# Patient Record
Sex: FEMALE | Race: WHITE | NOT HISPANIC OR LATINO | Employment: OTHER | URBAN - METROPOLITAN AREA
[De-identification: names, ages, dates, MRNs, and addresses within clinical notes are randomized per-mention and may not be internally consistent; named-entity substitution may affect disease eponyms.]

---

## 2017-02-16 ENCOUNTER — TELEPHONE (OUTPATIENT)
Dept: INTERNAL MEDICINE | Facility: CLINIC | Age: 77
End: 2017-02-16

## 2017-02-16 RX ORDER — CEPHALEXIN 500 MG/1
500 CAPSULE ORAL 2 TIMES DAILY
Qty: 14 CAPSULE | Refills: 0 | Status: SHIPPED | OUTPATIENT
Start: 2017-02-16 | End: 2017-12-08

## 2017-02-16 NOTE — TELEPHONE ENCOUNTER
"Pt called to say that she has a wound on her L shin, same as last time, car door hit her and needs and antibiotic. She does have an appt on Wednesday with her wound. States she has a \"red Sun'aq\" on the area and is afraid it may get infected by next week.  "

## 2017-02-22 ENCOUNTER — OFFICE VISIT (OUTPATIENT)
Dept: WOUND CARE | Facility: HOSPITAL | Age: 77
End: 2017-02-22
Attending: SURGERY

## 2017-02-22 PROCEDURE — G0463 HOSPITAL OUTPT CLINIC VISIT: HCPCS

## 2017-02-23 NOTE — WOUND CARE CLINIC NOTE
DATE OF VISIT: 02/22/2017    CHIEF COMPLAINT: Traumatic injury to left shin (L97.221).     HISTORY OF PRESENT ILLNESS: The patient is a 76-year-old female who struck her left middle shin on a door, causing some injury. Apparently this is the 3rd time she has done that and she recognizes that she needs to have the door fixed. This got really red and tender and swollen but she got treatment from her family doctor and he gave her a weeks prescription for Keflex, and she got over-the-counter medicine called Silver Gel, which is apparently somewhat like SilvaSorb. She stated that after being on the antibiotics and using the Silver Gel for 3-4 days the redness went away and the wound dried up and got much better, and essentially has healed and she only came because she felt like she already had an appointment and just to have it checked out. She is putting no dressing or medication on the wound now, and she is having mild tenderness in the general area but nothing too severe and denies redness or drainage. She has a few more days of antibiotics.     ALLERGIES: No known drug allergies.     MEDICATIONS:  1.  Aspirin.  2.  Keflex 500 mg twice a day.  3.  Allegra 180 mg tablet once a day.  4.  Synthroid.  5.  Bactroban.  6.  Pyridium.  7.  Crestor.     FAMILY HISTORY: She is . She does not smoke now, but she stopped 28 years ago. She has occasional alcohol such as wine. She denies recreational drugs. Denies use of caffeine.     FAMILY HISTORY: Heart disease.     PAST SURGICAL HISTORY:  1.  Tubal ligation in 1985.  2.  Sinus surgery for deviated septum in 1963 and 1965, and 1968.  3.  Benign tumor in neck 6 years ago.     REVIEW OF SYSTEMS:  HEENT: Has had history of cataracts. No history of glaucoma. Has history of chronic sinus problems with surgery. Denies middle ear problems.   HEMATOLOGIC: Denies anemia, hemophilia, sickle cell disease.   RESPIRATORY: Denies COPD, asthma, sleep apnea, or tuberculosis.  CARDIAC:  Denies arrhythmias, congestive heart failure, coronary artery disease or heart attack. Denies problems with hypertension, but does have a history of hyperlipidemia.   GASTROINTESTINAL: Denies problems with cirrhosis, hepatitis, ulcerations or change in bowel habits.   ENDOCRINE: Denies problems with diabetes, but does have problems with thyroid insufficiency.   GENITOURINARY: Denies renal insufficiency, kidney stones, or kidney infections. RHEUMATOLOGY: Denies lupus, Raynaud's, scleroderma or rheumatoid arthritis. MUSCULOSKELETAL: Denies gout, but does have a history of osteoarthritis.  NEUROPSYCHIATRY: Denies dementia or neuropathy.     PHYSICAL EXAMINATION:  GENERAL: Well-nourished, well-developed female in no acute distress.   VITAL SIGNS: Temperature 97, pulse 65, respirations 16, blood pressure 128/63.   HEENT: EOMs intact. Pupils equal, round, and reactive to light. Nose and throat are clear.   CHEST: Clear to percussion and auscultation.   CARDIAC: Regular rhythm and rate, with no apparent murmur.   ABDOMEN: Soft, nontender.   MUSCULOSKELETAL: Has no obvious musculoskeletal neuromuscular deficit.   EXTREMITIES: Examination lower extremities reveal there is no edema or swelling. She has bilateral palpable pulses. She does have a small superficially scabbed area over the anterior mid-3rd of her left lower extremity. The scabbed area measures 1.5 x 0.6 cm. This is not a wound; it is a dry scab and there is no wound underneath. There is no redness, cellulitis, warmth or drainage from the wound. Essentially this is healed and is closed.     ASSESSMENT AND PLAN: The patient apparently suffered a traumatic wound to her left shin with some early cellulitis, but this was aborted by the use of antibiotics and some form of a topical silver dressing. This is going to closure as there is really no residual wound or sign of infection. She is going to finish out several more days of the prescription she has. She is not  really putting anything on the wound and when the rest of these flaky, thin scabs come off she will start using moisturizing lotion. If in the future she should develop a wound again, particularly in this area, she is to come back and see us and we will be glad to see her and take care of her. At this point in time she has no further wound problems.         Abdoul Vanessa MD  TOR:co  D:   02/22/2017 14:56:27  T:   02/22/2017 23:40:49  Job ID:   [id number]  Document ID:   [id number]  Rev:  0  cc:

## 2017-03-30 DIAGNOSIS — E03.9 HYPOTHYROIDISM, UNSPECIFIED TYPE: ICD-10-CM

## 2017-03-30 DIAGNOSIS — Z00.00 HEALTH CARE MAINTENANCE: Primary | ICD-10-CM

## 2017-03-30 DIAGNOSIS — E78.5 HYPERLIPIDEMIA, UNSPECIFIED HYPERLIPIDEMIA TYPE: ICD-10-CM

## 2017-03-31 LAB
ALBUMIN SERPL-MCNC: 4.3 G/DL (ref 3.5–5.2)
ALBUMIN/GLOB SERPL: 2 G/DL
ALP SERPL-CCNC: 62 U/L (ref 39–117)
ALT SERPL-CCNC: 20 U/L (ref 1–33)
APPEARANCE UR: CLEAR
AST SERPL-CCNC: 22 U/L (ref 1–32)
BACTERIA #/AREA URNS HPF: NORMAL /HPF
BASOPHILS # BLD AUTO: 0.01 10*3/MM3 (ref 0–0.2)
BASOPHILS NFR BLD AUTO: 0.2 % (ref 0–1.5)
BILIRUB SERPL-MCNC: 0.3 MG/DL (ref 0.1–1.2)
BILIRUB UR QL STRIP: NEGATIVE
BUN SERPL-MCNC: 13 MG/DL (ref 8–23)
BUN/CREAT SERPL: 16 (ref 7–25)
CALCIUM SERPL-MCNC: 9.7 MG/DL (ref 8.6–10.5)
CHLORIDE SERPL-SCNC: 103 MMOL/L (ref 98–107)
CHOLEST SERPL-MCNC: 200 MG/DL (ref 0–200)
CO2 SERPL-SCNC: 25.4 MMOL/L (ref 22–29)
COLOR UR: YELLOW
CREAT SERPL-MCNC: 0.81 MG/DL (ref 0.57–1)
EOSINOPHIL # BLD AUTO: 0.13 10*3/MM3 (ref 0–0.7)
EOSINOPHIL NFR BLD AUTO: 3 % (ref 0.3–6.2)
EPI CELLS #/AREA URNS HPF: NORMAL /HPF
ERYTHROCYTE [DISTWIDTH] IN BLOOD BY AUTOMATED COUNT: 13.9 % (ref 11.7–13)
GLOBULIN SER CALC-MCNC: 2.1 GM/DL
GLUCOSE SERPL-MCNC: 87 MG/DL (ref 65–99)
GLUCOSE UR QL: NEGATIVE
HCT VFR BLD AUTO: 43.8 % (ref 35.6–45.5)
HDLC SERPL-MCNC: 84 MG/DL (ref 40–60)
HGB BLD-MCNC: 14.2 G/DL (ref 11.9–15.5)
HGB UR QL STRIP: NEGATIVE
IMM GRANULOCYTES # BLD: 0 10*3/MM3 (ref 0–0.03)
IMM GRANULOCYTES NFR BLD: 0 % (ref 0–0.5)
KETONES UR QL STRIP: NEGATIVE
LDLC SERPL CALC-MCNC: 99 MG/DL (ref 0–100)
LEUKOCYTE ESTERASE UR QL STRIP: NEGATIVE
LYMPHOCYTES # BLD AUTO: 1.35 10*3/MM3 (ref 0.9–4.8)
LYMPHOCYTES NFR BLD AUTO: 31 % (ref 19.6–45.3)
MCH RBC QN AUTO: 30.4 PG (ref 26.9–32)
MCHC RBC AUTO-ENTMCNC: 32.4 G/DL (ref 32.4–36.3)
MCV RBC AUTO: 93.8 FL (ref 80.5–98.2)
MICRO URNS: NORMAL
MICRO URNS: NORMAL
MONOCYTES # BLD AUTO: 0.37 10*3/MM3 (ref 0.2–1.2)
MONOCYTES NFR BLD AUTO: 8.5 % (ref 5–12)
MUCOUS THREADS URNS QL MICRO: PRESENT /HPF
NEUTROPHILS # BLD AUTO: 2.5 10*3/MM3 (ref 1.9–8.1)
NEUTROPHILS NFR BLD AUTO: 57.3 % (ref 42.7–76)
NITRITE UR QL STRIP: NEGATIVE
PH UR STRIP: 6.5 [PH] (ref 5–7.5)
PLATELET # BLD AUTO: 193 10*3/MM3 (ref 140–500)
POTASSIUM SERPL-SCNC: 4.4 MMOL/L (ref 3.5–5.2)
PROT SERPL-MCNC: 6.4 G/DL (ref 6–8.5)
PROT UR QL STRIP: NEGATIVE
RBC # BLD AUTO: 4.67 10*6/MM3 (ref 3.9–5.2)
RBC #/AREA URNS HPF: NORMAL /HPF
SODIUM SERPL-SCNC: 143 MMOL/L (ref 136–145)
SP GR UR: 1.02 (ref 1–1.03)
TRIGL SERPL-MCNC: 85 MG/DL (ref 0–150)
TSH SERPL DL<=0.005 MIU/L-ACNC: 2.54 MIU/ML (ref 0.27–4.2)
URINALYSIS REFLEX: NORMAL
UROBILINOGEN UR STRIP-MCNC: 0.2 MG/DL (ref 0.2–1)
VLDLC SERPL CALC-MCNC: 17 MG/DL (ref 5–40)
WBC # BLD AUTO: 4.36 10*3/MM3 (ref 4.5–10.7)
WBC #/AREA URNS HPF: NORMAL /HPF

## 2017-03-31 NOTE — PROGRESS NOTES
Your laboratory results are NORMAL. We will discuss these results in detail at your next office visit. Please call with any questions or concerns.  Sincerely,  Sola Gómez MD

## 2017-04-07 ENCOUNTER — OFFICE VISIT (OUTPATIENT)
Dept: INTERNAL MEDICINE | Facility: CLINIC | Age: 77
End: 2017-04-07

## 2017-04-07 VITALS
HEART RATE: 68 BPM | DIASTOLIC BLOOD PRESSURE: 70 MMHG | OXYGEN SATURATION: 97 % | BODY MASS INDEX: 26.45 KG/M2 | WEIGHT: 140 LBS | SYSTOLIC BLOOD PRESSURE: 136 MMHG

## 2017-04-07 DIAGNOSIS — E78.5 HYPERLIPIDEMIA, UNSPECIFIED HYPERLIPIDEMIA TYPE: ICD-10-CM

## 2017-04-07 DIAGNOSIS — R45.89 DEPRESSED MOOD: ICD-10-CM

## 2017-04-07 DIAGNOSIS — E03.9 HYPOTHYROIDISM, UNSPECIFIED TYPE: Primary | ICD-10-CM

## 2017-04-07 PROCEDURE — 99214 OFFICE O/P EST MOD 30 MIN: CPT | Performed by: INTERNAL MEDICINE

## 2017-04-07 NOTE — PROGRESS NOTES
Chief Complaint   Patient presents with   • Hypothyroidism   • Hyperlipidemia       History of Present Illness   Johana Corrigan is a 76 y.o. female presents for follow up evaluation. She has a history of hypothyroidism and dyslipidemia. She is doing fairly well today. However, some reduction in mood related to multiple demands of her volunteer activities, etc. She also is having some marital discorse now that spouse is working at home but patient feels he is not motivated to leave the house or do any of the household duties.    Has hypothyroidism but thyroid is balanced.  Hyperlipidemia- good cholesterol levels off of crestor. She has modified her diet to lower fat.       The following portions of the patient's history were reviewed and updated as appropriate: allergies, current medications, past family history, past medical history, past social history, past surgical history and problem list.  Current Outpatient Prescriptions on File Prior to Visit   Medication Sig Dispense Refill   • fexofenadine (ALLEGRA) 180 MG tablet Take  by mouth.     • levothyroxine (SYNTHROID, LEVOTHROID) 50 MCG tablet Take 1 tablet by mouth Daily. 90 tablet 2   • rosuvastatin (CRESTOR) 5 MG tablet Take  by mouth.     • aspirin 81 MG tablet Take 1 tablet by mouth.     • cephalexin (KEFLEX) 500 MG capsule Take 1 capsule by mouth 2 (Two) Times a Day. 14 capsule 0   • mupirocin (BACTROBAN) 2 % ointment Apply  topically 3 (Three) Times a Day. 15 g 0   • phenazopyridine (PYRIDIUM) 200 MG tablet One tablet 3 x's a day as needed for symptoms 10 tablet 0     Current Facility-Administered Medications on File Prior to Visit   Medication Dose Route Frequency Provider Last Rate Last Dose   • cyanocobalamin injection 1,000 mcg  1,000 mcg Intramuscular Q28 Days Sola Gómez MD   1,000 mcg at 07/11/16 1305     Review of Systems   Constitutional: Negative.    HENT: Negative.    Eyes: Negative.    Respiratory: Negative.    Cardiovascular: Negative.     Gastrointestinal: Negative.    Endocrine: Negative.    Genitourinary: Negative.    Musculoskeletal: Negative.    Skin: Negative.    Allergic/Immunologic: Negative.    Neurological: Negative.    Hematological: Negative.    Psychiatric/Behavioral: Negative.        Objective   Physical Exam   Constitutional: She is oriented to person, place, and time. She appears well-developed and well-nourished.   HENT:   Head: Normocephalic and atraumatic.   Right Ear: External ear normal.   Left Ear: External ear normal.   Nose: Nose normal.   Mouth/Throat: Oropharynx is clear and moist.   Eyes: EOM are normal. Pupils are equal, round, and reactive to light.   Neck: Normal range of motion. Neck supple.   Cardiovascular: Normal rate, regular rhythm and normal heart sounds.    Pulmonary/Chest: Effort normal and breath sounds normal. No respiratory distress.   Abdominal: Soft.   Musculoskeletal: Normal range of motion.   Neurological: She is alert and oriented to person, place, and time.   Skin: Skin is warm and dry.   Psychiatric: She has a normal mood and affect. Her behavior is normal. Judgment and thought content normal.   Nursing note and vitals reviewed.       /70  Pulse 68  Wt 140 lb (63.5 kg)  SpO2 97%  BMI 26.45 kg/m2    Assessment/Plan   Diagnoses and all orders for this visit:    Hypothyroidism, unspecified type    Depressed mood    Hyperlipidemia, unspecified hyperlipidemia type    hypothyroidism- will continue synthroid daily.   Depressed mood- patient to f/u w/ counselor. To  Engage in healthy fitness. To find an activity w/ spouse.   Dyslipidemia- will increase fitness and monitor lipids off crestor.  F/u 8 mo or prn.

## 2017-05-26 ENCOUNTER — OFFICE VISIT (OUTPATIENT)
Dept: INTERNAL MEDICINE | Facility: CLINIC | Age: 77
End: 2017-05-26

## 2017-05-26 VITALS
HEIGHT: 61 IN | WEIGHT: 142 LBS | OXYGEN SATURATION: 97 % | SYSTOLIC BLOOD PRESSURE: 122 MMHG | DIASTOLIC BLOOD PRESSURE: 70 MMHG | HEART RATE: 69 BPM | RESPIRATION RATE: 18 BRPM | BODY MASS INDEX: 26.81 KG/M2

## 2017-05-26 DIAGNOSIS — H81.11 BPPV (BENIGN PAROXYSMAL POSITIONAL VERTIGO), RIGHT: Primary | ICD-10-CM

## 2017-05-26 PROCEDURE — 99213 OFFICE O/P EST LOW 20 MIN: CPT | Performed by: INTERNAL MEDICINE

## 2017-05-26 RX ORDER — METHYLPREDNISOLONE 4 MG/1
TABLET ORAL
Qty: 21 TABLET | Refills: 0 | Status: SHIPPED | OUTPATIENT
Start: 2017-05-26 | End: 2017-12-08

## 2017-05-26 RX ORDER — MECLIZINE HYDROCHLORIDE 25 MG/1
25 TABLET ORAL 3 TIMES DAILY PRN
Qty: 30 TABLET | Refills: 2 | Status: SHIPPED | OUTPATIENT
Start: 2017-05-26 | End: 2017-12-08

## 2017-06-05 ENCOUNTER — APPOINTMENT (OUTPATIENT)
Dept: PHYSICAL THERAPY | Facility: HOSPITAL | Age: 77
End: 2017-06-05

## 2017-06-07 ENCOUNTER — HOSPITAL ENCOUNTER (OUTPATIENT)
Dept: PHYSICAL THERAPY | Facility: HOSPITAL | Age: 77
Setting detail: THERAPIES SERIES
Discharge: HOME | End: 2017-06-07

## 2017-06-07 DIAGNOSIS — R26.89 LOSS OF BALANCE: ICD-10-CM

## 2017-06-07 DIAGNOSIS — Z78.9 DIFFICULTY WITH ACTIVITIES OF DAILY LIVING: Primary | ICD-10-CM

## 2017-06-07 PROCEDURE — G8979 MOBILITY GOAL STATUS: HCPCS | Performed by: PHYSICAL THERAPIST

## 2017-06-07 PROCEDURE — 97110 THERAPEUTIC EXERCISES: CPT | Performed by: PHYSICAL THERAPIST

## 2017-06-07 PROCEDURE — 97162 PT EVAL MOD COMPLEX 30 MIN: CPT | Performed by: PHYSICAL THERAPIST

## 2017-06-07 PROCEDURE — G8978 MOBILITY CURRENT STATUS: HCPCS | Performed by: PHYSICAL THERAPIST

## 2017-06-07 NOTE — THERAPY EVALUATION
Outpatient Physical Therapy Vestibular Initial Evaluation/Treatment Note  [facility]     Patient Name: Johana Corrigan  : 1940  MRN: [id number]  Today's Date: 2017      Visit Date: 2017    Patient Active Problem List   Diagnosis   • Atopic rhinitis   • Osteoarthritis   • Fatigue   • Hyperlipidemia   • Hypothyroidism   • Leukopenia   • Subclinical hypothyroidism   • Wound abscess   • Depressed mood        Past Medical History:   Diagnosis Date   • Allergic    • Arthritis    • Cataract    • Headache    • Hyperthyroidism         Past Surgical History:   Procedure Laterality Date   • NOSE SURGERY     • SUBMANDIBULAR GLAND EXCISION     • TONSILLECTOMY     • TUBAL ABDOMINAL LIGATION           Visit Dx:     ICD-10-CM ICD-9-CM   1. Difficulty with activities of daily living R53.81 799.3   2. Loss of balance R26.89 781.99             Patient History       17 0800          History    Chief Complaint Balance Problems;Difficulty with daily activities;Dizziness  -      Date Current Problem(s) Began 17  -MP      Brief Description of Current Complaint Johana reports vertigo beginning approximately two weeks ago.  She is able to do all her normal ADLs but is cautious going down steps and has more difficulty with balance first thing in the morning.  She has been doing some exercises for the vertigo since seeing Dr. Gómez 10-14 days ago. She feels that she is somewhat better since doing the exercises given by her Dr. Gómez.  -MP      Onset Date- PT 2017  -MP      Patient/Caregiver Goals Relief from dizziness;Know what to do to help the symptoms  -MP      Current Tobacco Use Zero  -MP      Smoking Status She quit smoking approximately 27 years ago.  -MP      Patient's Rating of General Health Good  -MP      Hand Dominance left-handed  -MP      Occupation/sports/leisure activities Retired .  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 Yes   Dr. Sola Gómez  -MP      How has patient tried to help current problem? Home exercises.  -MP      Fall Risk Assessment    Any falls in the past year: No  -MP      Services    Prior Rehab/Home Health Experiences No  -MP      Do you plan to receive Home Health services in the near future No  -MP      Daily Activities    Primary Language English  -MP      Pt Participated in POC and Goals Yes  -MP      Safety    Are you being hurt, hit, or frightened by anyone at home or in your life? No  -MP      Are you being neglected by a caregiver No  -MP        User Key  (r) = Recorded By, (t) = Taken By, (c) = Cosigned By    Initials Name Provider Type    MP Bull Pepper, PT Physical Therapist                [facility]       06/07/17 1300          Vestibular Objective    General Observation Gait was normal on level surface using no assistive device or brace  -MP      Occulomotor Exam Fixation Present    Spontaneous Nystagmus Absent  -MP      Gaze-induced Nystagmus Absent  -MP      Smooth Pursuit Normal  -MP      Saccades Intact  -MP      Vestibulo-Occular Reflex (VOR)    VOR to Slow Head Movement Normal  -MP      VOR to Fast Head Movement/Head Thrust Test Normal  -MP      VOR Cancellation Normal  -MP      Positional Testing    Vertebrobasilar Artery Screen - Right Negative  -MP      Vertebrobasilar Artery Screen - Left Negative  -MP      [place]-Hallpike Right No nystagmus  -MP      G[place]-Hallpike Left No nystagmus  -MP      Horizontal Roll Test Right No nystagmus  -MP      Horizontal Roll Test Left No nystagmus  -MP      ROM (Range of Motion)    General ROM Detail C spine flexion, WNL, extension minimal to moderate decrease, Rotation L moderate decrease, R minimal to moderate decrease, LB B moderate decrease but no nystagmus with any movement.    -MP      Sensation    Sensation --   C2-T2 dermatomes were intact/equal to light touch  -MP        User Key   (r) = Recorded By, (t) = Taken By, (c) = Cosigned By    Initials Name Provider Type    JOSE Pepper PT Physical Therapist                Therapy Education       06/07/17 1318          Therapy Education    Given HEP  -MP      Program New  -MP      How Provided Written;Demonstration  -MP      Provided to Patient  -MP      Level of Understanding Teach back education performed   Paul-Daroff Habitutation exercises were issued.  -MP        User Key  (r) = Recorded By, (t) = Taken By, (c) = Cosigned By    Initials Name Provider Type    JOSE Pepper PT Physical Therapist                  PT OP Goals       06/07/17 1300       PT Short Term Goals    STG Date to Achieve 06/23/17  -MP     STG 1 Ms. Bridge is reassessed if vertigo is till present on second visit.  -MP     STG 1 Progress New  -MP     STG 2 Dizziness handicap index improves to less than 20%.  -MP     STG 2 Progress New  -MP     Long Term Goals    LTG Date to Achieve 07/07/17  -MP     LTG 1 Ms. Shekhar is symptom free of vertigo.  -MP     LTG 1 Progress New  -MP     LTG 2 She is independent with all self care education.  -MP     LTG 2 Progress New  -MP     LTG 3 Dizziness handicap index improves to less than 10%.  -MP     LTG 3 Progress New  -MP     Time Calculation    PT Goal Re-Cert Due Date 07/07/17  -MP       User Key  (r) = Recorded By, (t) = Taken By, (c) = Cosigned By    Initials Name Provider Type    JOSE Pepper PT Physical Therapist                PT Assessment/Plan       06/07/17 1326       PT Assessment    Functional Limitations Impaired locomotion;Performance in leisure activities;Limitations in functional capacity and performance;Limitations in community activities  -MP     Impairments Balance;Locomotion  -MP     Assessment Comments Vertigo which, due to negative responses to BPPV testing, seems to be centrally based.    -MP     Please refer to paper survey for additional self-reported information Yes  -MP     Rehab Potential Good  -MP      Patient/caregiver participated in establishment of treatment plan and goals Yes  -MP     Patient would benefit from skilled therapy intervention Yes  -MP     PT Plan    PT Frequency Other (comment)   Once every 2 weeks  -MP     Predicted Duration of Therapy Intervention (days/wks) 4 weeks  -MP     Planned CPT's? PT EVAL MOD COMPLELITY: 01255;PT NEUROMUSC RE-EDUCATION EA 15 MIN: 34658;PT SELF CARE/HOME MGMT/TRAIN EA 15: 98388;PT THER PROC EA 15 MIN: 10819  -MP     PT Plan Comments Ms. Corrigan will be reassessed in approximately two weeks to see the effect of the Paul-Daroff habituation exercises.  -MP       User Key  (r) = Recorded By, (t) = Taken By, (c) = Cosigned By    Initials Name Provider Type    JOSE Pepper PT Physical Therapist                 Outcome Measures       06/07/17 1300          Functional Assessment    Outcome Measure Options Dizziness Handicap Inventory   23/100, 23% disability  -MP        User Key  (r) = Recorded By, (t) = Taken By, (c) = Cosigned By    Initials Name Provider Type    JOSE Pepper PT Physical Therapist            Time Calculation:   Start Time: 0800  Stop Time: 0845  Time Calculation (min): 45 min     Therapy Charges for Today     Code Description Service Date Service Provider Modifiers Qty    46791925007 HC PT MOBILITY CURRENT 6/7/2017 Bull Pepper, PT GP, CJ 1    95798312955 HC PT MOBILITY PROJECTED 6/7/2017 Bull Pepper PT GP, CI 1    24703252942 HC PT THER PROC EA 15 MIN 6/7/2017 Bull Pepper PT GP 1    08452901774 HC PT EVAL MOD COMPLEXITY 2 6/7/2017 Bull Pepper, PT GP 1          PT G-Codes  Outcome Measure Options: Dizziness Handicap Inventory  Score: 23/100, 23% disability  Functional Limitation: Mobility: Walking and moving around  Mobility: Walking and Moving Around Current Status (): At least 20 percent but less than 40 percent impaired, limited or restricted  Mobility: Walking and Moving Around Goal Status (): At least 1 percent but less than  20 percent impaired, limited or restricted         Bull Pepper, PT  6/7/2017

## 2017-06-22 ENCOUNTER — APPOINTMENT (OUTPATIENT)
Dept: PHYSICAL THERAPY | Facility: HOSPITAL | Age: 77
End: 2017-06-22

## 2017-07-24 ENCOUNTER — DOCUMENTATION (OUTPATIENT)
Dept: PHYSICAL THERAPY | Facility: HOSPITAL | Age: 77
End: 2017-07-24

## 2017-07-24 NOTE — THERAPY DISCHARGE NOTE
Outpatient Physical Therapy Ortho Discharge Summary       Patient Name: Johana Corrigan  : 1940  MRN: [id number]  Today's Date: 2017      Visit Date: 2017    Patient Active Problem List   Diagnosis   • Atopic rhinitis   • Osteoarthritis   • Fatigue   • Hyperlipidemia   • Hypothyroidism   • Leukopenia   • Subclinical hypothyroidism   • Wound abscess   • Depressed mood        Past Medical History:   Diagnosis Date   • Allergic    • Arthritis    • Cataract    • Headache    • Hyperthyroidism         Past Surgical History:   Procedure Laterality Date   • NOSE SURGERY     • SUBMANDIBULAR GLAND EXCISION     • TONSILLECTOMY     • TUBAL ABDOMINAL LIGATION           Visit Dx:   No diagnosis found.          PT OP Goals       17 0700       PT Short Term Goals    STG Date to Achieve 17  -MP     STG 1 Ms. Corrigan is reassessed if vertigo is till present on second visit.  -MP     STG 1 Progress New  -MP     STG 2 Dizziness handicap index improves to less than 20%.  -MP     STG 2 Progress Not Met  -MP     Long Term Goals    LTG Date to Achieve 17  -MP     LTG 1 Ms. Corrigan is symptom free of vertigo.  -MP     LTG 1 Progress Not Met  -MP     LTG 2 She is independent with all self care education.  -MP     LTG 2 Progress Not Met  -MP     LTG 3 Dizziness handicap index improves to less than 10%.  -MP     LTG 3 Progress Not Met  -MP       User Key  (r) = Recorded By, (t) = Taken By, (c) = Cosigned By    Initials Name Provider Type    JOSE Pepper, PT Physical Therapist                    OP PT Discharge Summary  Date of Discharge: 17  Reason for Discharge: other (comment) (Patient did not return for further treatment.)  Outcomes Achieved: Other (Unable to assess for progress.)  Discharge Destination: Unknown  Discharge Instructions: Ms. Corrigan was evaluated and did not return for further assessment/treatment.        Bull Pepper PT  2017

## 2017-07-28 RX ORDER — LEVOTHYROXINE SODIUM 0.05 MG/1
TABLET ORAL
Qty: 90 TABLET | Refills: 0 | Status: SHIPPED | OUTPATIENT
Start: 2017-07-28 | End: 2017-10-09 | Stop reason: SDUPTHER

## 2017-09-20 ENCOUNTER — TRANSCRIBE ORDERS (OUTPATIENT)
Dept: PHYSICAL THERAPY | Facility: HOSPITAL | Age: 77
End: 2017-09-20

## 2017-09-20 DIAGNOSIS — M75.21 BICEPS TENDONITIS ON RIGHT: Primary | ICD-10-CM

## 2017-10-04 ENCOUNTER — FLU SHOT (OUTPATIENT)
Dept: INTERNAL MEDICINE | Facility: CLINIC | Age: 77
End: 2017-10-04

## 2017-10-04 ENCOUNTER — HOSPITAL ENCOUNTER (OUTPATIENT)
Dept: PHYSICAL THERAPY | Facility: HOSPITAL | Age: 77
Setting detail: THERAPIES SERIES
Discharge: HOME | End: 2017-10-04
Attending: SPECIALIST

## 2017-10-04 DIAGNOSIS — Z78.9 DIFFICULTY WITH ACTIVITIES OF DAILY LIVING: ICD-10-CM

## 2017-10-04 DIAGNOSIS — M25.511 PAIN IN JOINT OF RIGHT SHOULDER: Primary | ICD-10-CM

## 2017-10-04 PROCEDURE — G8985 CARRY GOAL STATUS: HCPCS | Performed by: PHYSICAL THERAPIST

## 2017-10-04 PROCEDURE — G8984 CARRY CURRENT STATUS: HCPCS | Performed by: PHYSICAL THERAPIST

## 2017-10-04 PROCEDURE — 97110 THERAPEUTIC EXERCISES: CPT | Performed by: PHYSICAL THERAPIST

## 2017-10-04 PROCEDURE — 90662 IIV NO PRSV INCREASED AG IM: CPT | Performed by: INTERNAL MEDICINE

## 2017-10-04 PROCEDURE — 97161 PT EVAL LOW COMPLEX 20 MIN: CPT | Performed by: PHYSICAL THERAPIST

## 2017-10-04 PROCEDURE — 90471 IMMUNIZATION ADMIN: CPT | Performed by: INTERNAL MEDICINE

## 2017-10-04 NOTE — THERAPY EVALUATION
Outpatient Physical Therapy Ortho Initial Evaluation  [facility]     Patient Name: Johana Corrigan  : 1940  MRN: [id number]  Today's Date: 10/4/2017      Visit Date: 10/04/2017    Patient Active Problem List   Diagnosis   • Atopic rhinitis   • Osteoarthritis   • Fatigue   • Hyperlipidemia   • Hypothyroidism   • Leukopenia   • Subclinical hypothyroidism   • Wound abscess   • Depressed mood        Past Medical History:   Diagnosis Date   • Allergic    • Arthritis    • Cataract    • Headache    • Hyperthyroidism         Past Surgical History:   Procedure Laterality Date   • NOSE SURGERY     • SUBMANDIBULAR GLAND EXCISION     • TONSILLECTOMY     • TUBAL ABDOMINAL LIGATION         Visit Dx:     ICD-10-CM ICD-9-CM   1. Pain in joint of right shoulder M25.511 719.41   2. Difficulty with activities of daily living R53.81 799.3             Patient History       10/04/17 0800          History    Chief Complaint Difficulty with daily activities;Pain  -MP      Type of Pain Shoulder pain  -MP      Date Current Problem(s) Began 17  -      Brief Description of Current Complaint Johana reports having R shoulder pain and difficulty with daily activities, specifically turning a steering wheell, for approximately 4 weeks.  She visited Dr. Colon and she did an X ray and gave Johana a cortisone injection within a week of the problem starting.  She feels that the pain is improved but due to the pain and difficulty turning her steering wheel.    -MP      Onset Date- PT 2017  -MP      Patient/Caregiver Goals Relieve pain;Return to prior level of function;Know what to do to help the symptoms   No problem with steering wheel.  -MP      Current Tobacco Use Zero  -MP      Smoking Status Quit smoking 26 years ago.  -MP      Patient's Rating of General Health Very good  -MP      Hand Dominance left-handed  -MP      Occupation/sports/leisure activities Retired .  She enjoys reading, reading groups,  needle point and socializing with her friends.  -MP      Pain     Pain Location Shoulder   Right  -MP      Pain at Present 0  -MP      Pain at Best 0  -MP      Pain at Worst 7  -MP      Pain Frequency Intermittent  -MP      Pain Description Sharp;Stabbing  -MP      What Performance Factors Make the Current Problem(s) WORSE? Turning her steering wheel.  -MP      What Performance Factors Make the Current Problem(s) BETTER? Not turning the steering wheel or not using the R UE.  -MP      Fall Risk Assessment    Any falls in the past year: No  -MP      Does patient have a fear of falling No  -MP      Services    Prior Rehab/Home Health Experiences No  -MP      Do you plan to receive Home Health services in the near future No  -MP      Daily Activities    Primary Language English  -MP      Are you able to read Yes  -MP      Are you able to write Yes  -MP      How does patient learn best? Demonstration;Pictures/Video  -MP      Pt Participated in POC and Goals Yes  -MP      Safety    Are you being hurt, hit, or frightened by anyone at home or in your life? No  -MP      Are you being neglected by a caregiver No  -MP        User Key  (r) = Recorded By, (t) = Taken By, (c) = Cosigned By    Initials Name Provider Type    JOSE Pepper PT Physical Therapist                PT Ortho       10/04/17 0800    Posture/Observations    Posture/Observations Comments Lateral view of the spine, forward head, increased thoracic kyphosis and decreased lumbar lordosis.  Sensation: C2-T2 dermatomes were equal/intact B.  DTRs: B UEs were trace/equal B.  Upper motor neuron tests; Cogwheel Clonus and Jarquin's sign were negative.  -MP    ROM (Range of Motion)    General ROM Detail Functional AROM of the Shoulders, elevation in the scapular plane L 166 degrees, 125 degrees with pain at end range, behind the neck, L T1 spinous process, R C6 spinous process with pain and reach behind the back, L T12 spinous process with pain, R T8 spinous process.   PROM in supine, R shoulder flexion 171 degrees,  degrees and IR 80 degrees, all with normal end feels.  L shoulder flexion 159 empty end feel,  degrees with empty end feel and IR, 35 degrees with spasm end feel.  -MP    Pathomechanics    Pathomechanics Comments Special Tests: Supraspinatus, L negative, R positive.  Neers, L negative, R positive.  Guerrero/Jorge A, L negative, R positive  -MP      User Key  (r) = Recorded By, (t) = Taken By, (c) = Cosigned By    Initials Name Provider Type    JOSE Pepper, PT Physical Therapist                Therapy Education       10/04/17 0911          Therapy Education    Education Details Sleeper stretch for the R UE was issued to begin her HEP.  -MP      Given HEP  -MP      Program New  -MP      How Provided Written  -MP      Provided to Patient  -MP      Level of Understanding Teach back education performed  -MP        User Key  (r) = Recorded By, (t) = Taken By, (c) = Cosigned By    Initials Name Provider Type    JOSE Pepper PT Physical Therapist                PT OP Goals       10/04/17 0900       PT Short Term Goals    STG Date to Achieve 11/03/17  -MP     STG 1 Johana is introduced to PROM exercises to improve flexion, ER and IR of the R shoulder.  -MP     STG 1 Progress New  -MP     STG 2 She begins exercises to strength scapular musculature.  -MP     STG 2 Progress New  -MP     STG 3 Shoulder wall slides are begun.  -MP     STG 3 Progress New  -MP     Long Term Goals    LTG Date to Achieve 12/01/17  -MP     LTG 1 Functional AROM of the R shoulder is pain free and equal to the L.  -MP     LTG 1 Progress New  -MP     LTG 2 Strength, R shoulder, cardinal motions, equals 4+/5.  -MP     LTG 2 Progress New  -MP     LTG 3 She is able to turn her steering wheel without R shoulder pain.  -MP     LTG 3 Progress New  -MP     LTG 4 Johana is independent with a complete HEP and self care eduation.  -MP     LTG 4 Progress New  -MP     Time Calculation    PT Goal  Re-Cert Due Date 11/03/17  -MP       User Key  (r) = Recorded By, (t) = Taken By, (c) = Cosigned By    Initials Name Provider Type    JOSE Pepper PT Physical Therapist                Exercises       10/04/17 0900          Exercise 1    Exercise Name 1 In R sidelying, she performed sleeper stretch, R UE, 30 s x 5.    -MP        User Key  (r) = Recorded By, (t) = Taken By, (c) = Cosigned By    Initials Name Provider Type    JOSE Pepper PT Physical Therapist           Manual Rx (last 36 hours)      Manual Treatments       10/04/17 0900          Manual Rx 1    Manual Rx 1 Location R UE   -MP      Manual Rx 1 Type oscillations  -MP      Manual Rx 1 Duration 3 minutes  -MP      Manual Rx 2    Manual Rx 2 Location R GH joint  -MP      Manual Rx 2 Type Posterior glides  -MP      Manual Rx 2 Grade 3  -MP      Manual Rx 2 Duration 5 x 3.  -MP        User Key  (r) = Recorded By, (t) = Taken By, (c) = Cosigned By    Initials Name Provider Type    JOSE Pepper PT Physical Therapist                 Outcome Measures       10/04/17 0900          DASH    DASH COMMENTS 15  -MP      Functional Assessment    Outcome Measure Options Disabilities of the Arm, Shoulder, and Hand (DASH)  -MP        User Key  (r) = Recorded By, (t) = Taken By, (c) = Cosigned By    Initials Name Provider Type    JOSE Pepper PT Physical Therapist            Time Calculation:   Start Time: 0800  Stop Time: 0845  Time Calculation (min): 45 min     Therapy Charges for Today     Code Description Service Date Service Provider Modifiers Qty    46822089080 HC PT CARRY MOV HAND OBJ CURRENT 10/4/2017 Bull Pepper PT GP, CJ 1    39448751901 HC PT CARRY MOV HAND OBJ PROJECTED 10/4/2017 Bull Pepper PT GP, CI 1    13657425021 HC PT THER PROC EA 15 MIN 10/4/2017 Bull Pepper PT GP 1    08440530646 HC PT EVAL LOW COMPLEXITY 2 10/4/2017 Bull Pepper PT GP 1          PT G-Codes  PT Professional Judgement Used?: Yes  Outcome Measure Options: Disabilities  of the Arm, Shoulder, and Hand (DASH)  Score: 15  Functional Limitation: Carrying, moving and handling objects  Carrying, Moving and Handling Objects Current Status (): At least 20 percent but less than 40 percent impaired, limited or restricted  Carrying, Moving and Handling Objects Goal Status (): At least 1 percent but less than 20 percent impaired, limited or restricted         Bull Pepper, PT  10/4/2017

## 2017-10-05 ENCOUNTER — TRANSCRIBE ORDERS (OUTPATIENT)
Dept: ADMINISTRATIVE | Facility: HOSPITAL | Age: 77
End: 2017-10-05

## 2017-10-05 DIAGNOSIS — Z12.31 VISIT FOR SCREENING MAMMOGRAM: Primary | ICD-10-CM

## 2017-10-06 ENCOUNTER — APPOINTMENT (OUTPATIENT)
Dept: PHYSICAL THERAPY | Facility: HOSPITAL | Age: 77
End: 2017-10-06

## 2017-10-09 ENCOUNTER — HOSPITAL ENCOUNTER (OUTPATIENT)
Dept: PHYSICAL THERAPY | Facility: HOSPITAL | Age: 77
Setting detail: THERAPIES SERIES
Discharge: HOME | End: 2017-10-09

## 2017-10-09 DIAGNOSIS — Z78.9 DIFFICULTY WITH ACTIVITIES OF DAILY LIVING: ICD-10-CM

## 2017-10-09 DIAGNOSIS — M25.511 PAIN IN JOINT OF RIGHT SHOULDER: Primary | ICD-10-CM

## 2017-10-09 PROCEDURE — 97110 THERAPEUTIC EXERCISES: CPT | Performed by: PHYSICAL THERAPIST

## 2017-10-09 PROCEDURE — 97140 MANUAL THERAPY 1/> REGIONS: CPT | Performed by: PHYSICAL THERAPIST

## 2017-10-09 RX ORDER — LEVOTHYROXINE SODIUM 0.05 MG/1
50 TABLET ORAL DAILY
Qty: 90 TABLET | Refills: 0 | Status: SHIPPED | OUTPATIENT
Start: 2017-10-09 | End: 2017-12-08 | Stop reason: DRUGHIGH

## 2017-10-09 NOTE — THERAPY TREATMENT NOTE
Outpatient Physical Therapy Ortho Treatment Note  [facility]     Patient Name: Johana Corrigan  : 1940  MRN: [id number]  Today's Date: 10/9/2017      Visit Date: 10/09/2017    Visit Dx:    ICD-10-CM ICD-9-CM   1. Pain in joint of right shoulder M25.511 719.41   2. Difficulty with activities of daily living R53.81 799.3       Patient Active Problem List   Diagnosis   • Atopic rhinitis   • Osteoarthritis   • Fatigue   • Hyperlipidemia   • Hypothyroidism   • Leukopenia   • Subclinical hypothyroidism   • Wound abscess   • Depressed mood        Past Medical History:   Diagnosis Date   • Allergic    • Arthritis    • Cataract    • Headache    • Hyperthyroidism         Past Surgical History:   Procedure Laterality Date   • NOSE SURGERY     • SUBMANDIBULAR GLAND EXCISION     • TONSILLECTOMY     • TUBAL ABDOMINAL LIGATION               PT Assessment/Plan       10/09/17 1648       PT Assessment    Assessment Comments Johana tolerated treatment well.  The R shoulder was weaker with ER PRE versus the L shoulder.  She was not having pain following treatment and progressed her HEP.  -MP     PT Plan    PT Plan Comments Continue progressing therapeutic exercises per her tolerance level.  -MP       User Key  (r) = Recorded By, (t) = Taken By, (c) = Cosigned By    Initials Name Provider Type    JOSE Pepper, PT Physical Therapist                Exercises       10/09/17 1600          Subjective Comments    Subjective Comments Johana stated that her R shoulder has felt good recently.  -MP      Subjective Pain    Able to rate subjective pain? yes  -MP      Pre-Treatment Pain Level 0  -MP      Post-Treatment Pain Level 0  -MP      Exercise 1    Exercise Name 1 Refer to land flow sheet  -MP      Exercise 2    Exercise Name 2 Progressed therapeutic exercsies with standing straight arm lat pulls, 12.5 lbs. 10 x 3, scapular row, 10 lbs. 10 x 3, shoulder wall slides with towel, x 10 and sidelying ER PRE, L UE 2 lbs. 10 x 2 and R  2 lbs, x 10 and 1 lb. x 10.    -MP        User Key  (r) = Recorded By, (t) = Taken By, (c) = Cosigned By    Initials Name Provider Type    JOSE Pepper PT Physical Therapist             Manual Rx (last 36 hours)      Manual Treatments       10/09/17 1500          Manual Rx 1    Manual Rx 1 Location R UE   -MP      Manual Rx 1 Type oscillations  -MP      Manual Rx 1 Duration 3 minutes  -MP      Manual Rx 2    Manual Rx 2 Location R GH joint  -MP      Manual Rx 2 Type Posterior, inferior and anterior glides  -MP      Manual Rx 2 Grade 3  -MP      Manual Rx 2 Duration 5 x 3 each.  -MP        User Key  (r) = Recorded By, (t) = Taken By, (c) = Cosigned By    Initials Name Provider Type    JOSE Pepper PT Physical Therapist                PT OP Goals       10/09/17 1600       PT Short Term Goals    STG Date to Achieve 11/03/17  -MP     STG 1 Johana is introduced to PROM exercises to improve flexion, ER and IR of the R shoulder.  -MP     STG 1 Progress Partially Met  -MP     STG 2 She begins exercises to strength scapular musculature.  -MP     STG 2 Progress Met  -MP     STG 3 Shoulder wall slides are begun.  -MP     STG 3 Progress Met  -MP     Long Term Goals    LTG Date to Achieve 12/01/17  -MP     LTG 1 Functional AROM of the R shoulder is pain free and equal to the L.  -MP     LTG 1 Progress Ongoing  -MP     LTG 2 Strength, R shoulder, cardinal motions, equals 4+/5.  -MP     LTG 2 Progress Ongoing  -MP     LTG 3 She is able to turn her steering wheel without R shoulder pain.  -MP     LTG 3 Progress Ongoing  -MP     LTG 4 Johana is independent with a complete HEP and self care eduation.  -MP     LTG 4 Progress Ongoing  -MP       User Key  (r) = Recorded By, (t) = Taken By, (c) = Cosigned By    Initials Name Provider Type    JOSE Pepper PT Physical Therapist                Therapy Education       10/09/17 1647          Therapy Education    Education Details Shoulder wall slides added to her HEP.  -MP       Given HEP  -MP      Program New  -MP      How Provided Written  -MP      Provided to Patient  -MP      Level of Understanding Teach back education performed  -MP        User Key  (r) = Recorded By, (t) = Taken By, (c) = Cosigned By    Initials Name Provider Type    MP Bull Pepper PT Physical Therapist        Time Calculation:   Start Time: 1600  Stop Time: 1640  Time Calculation (min): 40 min    Therapy Charges for Today     Code Description Service Date Service Provider Modifiers Qty    55602538291 HC PT THER PROC EA 15 MIN 10/9/2017 Bull Pepper PT GP 2    10235339496 HC PT MANUAL THERAPY EA 15 MIN 10/9/2017 Bull Pepper PT GP 1          Bull Pepper PT  10/9/2017

## 2017-10-12 ENCOUNTER — HOSPITAL ENCOUNTER (OUTPATIENT)
Dept: PHYSICAL THERAPY | Facility: HOSPITAL | Age: 77
Setting detail: THERAPIES SERIES
Discharge: HOME | End: 2017-10-12

## 2017-10-12 DIAGNOSIS — Z78.9 DIFFICULTY WITH ACTIVITIES OF DAILY LIVING: ICD-10-CM

## 2017-10-12 DIAGNOSIS — M25.511 PAIN IN JOINT OF RIGHT SHOULDER: Primary | ICD-10-CM

## 2017-10-12 PROCEDURE — 97110 THERAPEUTIC EXERCISES: CPT | Performed by: PHYSICAL THERAPIST

## 2017-10-12 NOTE — THERAPY TREATMENT NOTE
Outpatient Physical Therapy Ortho Treatment Note  [facility]     Patient Name: Johana Corrigan  : 1940  MRN: [id number]  Today's Date: 10/12/2017      Visit Date: 10/12/2017    Visit Dx:    ICD-10-CM ICD-9-CM   1. Pain in joint of right shoulder M25.511 719.41   2. Difficulty with activities of daily living R53.81 799.3       Patient Active Problem List   Diagnosis   • Atopic rhinitis   • Osteoarthritis   • Fatigue   • Hyperlipidemia   • Hypothyroidism   • Leukopenia   • Subclinical hypothyroidism   • Wound abscess   • Depressed mood        Past Medical History:   Diagnosis Date   • Allergic    • Arthritis    • Cataract    • Headache    • Hyperthyroidism         Past Surgical History:   Procedure Laterality Date   • NOSE SURGERY     • SUBMANDIBULAR GLAND EXCISION     • TONSILLECTOMY     • TUBAL ABDOMINAL LIGATION               PT Assessment/Plan       10/12/17 0851       PT Assessment    Assessment Comments Johana is responding well to treatment thus far.    -MP     PT Plan    PT Plan Comments Progress therapeutic exercises with resisted dumbell overhead press  -MP       User Key  (r) = Recorded By, (t) = Taken By, (c) = Cosigned By    Initials Name Provider Type    JOSE Pepper, PT Physical Therapist                Exercises       10/12/17 0800          Subjective Comments    Subjective Comments Johana stated that her R shoulder is feeling better.  -MP      Subjective Pain    Able to rate subjective pain? yes  -MP      Pre-Treatment Pain Level 0  -MP      Post-Treatment Pain Level 0  -MP      Exercise 1    Exercise Name 1 Refer to land flow sheet  -MP        User Key  (r) = Recorded By, (t) = Taken By, (c) = Cosigned By    Initials Name Provider Type    JOSE Pepper PT Physical Therapist             Manual Rx (last 36 hours)      Manual Treatments       10/12/17 0700          Manual Rx 1    Manual Rx 1 Location R UE   -MP      Manual Rx 1 Type oscillations  -MP      Manual Rx 1 Duration 3 minutes   -MP      Manual Rx 2    Manual Rx 2 Location R GH joint  -MP      Manual Rx 2 Type Inferiro and posterior glides  -MP      Manual Rx 2 Grade 3-4  -MP      Manual Rx 2 Duration 5 x 3 each.  -MP        User Key  (r) = Recorded By, (t) = Taken By, (c) = Cosigned By    Initials Name Provider Type    JOSE Pepper PT Physical Therapist                PT OP Goals       10/12/17 0800       PT Short Term Goals    STG Date to Achieve 11/03/17  -MP     STG 1 Johana is introduced to PROM exercises to improve flexion, ER and IR of the R shoulder.  -MP     STG 1 Progress Partially Met  -MP     STG 2 She begins exercises to strength scapular musculature.  -MP     STG 2 Progress Met  -MP     STG 3 Shoulder wall slides are begun.  -MP     STG 3 Progress Met  -MP     Long Term Goals    LTG Date to Achieve 12/01/17  -MP     LTG 1 Functional AROM of the R shoulder is pain free and equal to the L.  -MP     LTG 1 Progress Ongoing  -MP     LTG 2 Strength, R shoulder, cardinal motions, equals 4+/5.  -MP     LTG 2 Progress Ongoing  -MP     LTG 3 She is able to turn her steering wheel without R shoulder pain.  -MP     LTG 3 Progress Ongoing  -MP     LTG 4 Johana is independent with a complete HEP and self care eduation.  -MP     LTG 4 Progress Ongoing  -MP       User Key  (r) = Recorded By, (t) = Taken By, (c) = Cosigned By    Initials Name Provider Type    JOSE Pepper PT Physical Therapist                Therapy Education       10/12/17 0851          Therapy Education    Given HEP  -MP      Program Reinforced  -MP      How Provided Verbal  -MP      Provided to Patient  -MP      Level of Understanding Teach back education performed  -MP        User Key  (r) = Recorded By, (t) = Taken By, (c) = Cosigned By    Initials Name Provider Type    JOSE Pepper PT Physical Therapist        Time Calculation:   Start Time: 0805  Stop Time: 0845  Time Calculation (min): 40 min    Therapy Charges for Today     Code Description Service Date  Service Provider Modifiers Qty    95280423930 HC PT THER PROC EA 15 MIN 10/12/2017 Bull Pepper, PT GP 3          Bull Pepper, PT  10/12/2017

## 2017-10-16 ENCOUNTER — HOSPITAL ENCOUNTER (OUTPATIENT)
Dept: PHYSICAL THERAPY | Facility: HOSPITAL | Age: 77
Setting detail: THERAPIES SERIES
Discharge: HOME | End: 2017-10-16

## 2017-10-16 DIAGNOSIS — Z78.9 DIFFICULTY WITH ACTIVITIES OF DAILY LIVING: ICD-10-CM

## 2017-10-16 DIAGNOSIS — M25.511 PAIN IN JOINT OF RIGHT SHOULDER: Primary | ICD-10-CM

## 2017-10-16 PROCEDURE — 97110 THERAPEUTIC EXERCISES: CPT | Performed by: PHYSICAL THERAPIST

## 2017-10-16 PROCEDURE — 97140 MANUAL THERAPY 1/> REGIONS: CPT | Performed by: PHYSICAL THERAPIST

## 2017-10-16 NOTE — THERAPY TREATMENT NOTE
Outpatient Physical Therapy Ortho Treatment Note  [facility]     Patient Name: Johana Corrigan  : 1940  MRN: [id number]  Today's Date: 10/16/2017      Visit Date: 10/16/2017    Visit Dx:    ICD-10-CM ICD-9-CM   1. Pain in joint of right shoulder M25.511 719.41   2. Difficulty with activities of daily living R53.81 799.3       Patient Active Problem List   Diagnosis   • Atopic rhinitis   • Osteoarthritis   • Fatigue   • Hyperlipidemia   • Hypothyroidism   • Leukopenia   • Subclinical hypothyroidism   • Wound abscess   • Depressed mood        Past Medical History:   Diagnosis Date   • Allergic    • Arthritis    • Cataract    • Headache    • Hyperthyroidism         Past Surgical History:   Procedure Laterality Date   • NOSE SURGERY     • SUBMANDIBULAR GLAND EXCISION     • TONSILLECTOMY     • TUBAL ABDOMINAL LIGATION                 PT Assessment/Plan       10/16/17 0849       PT Assessment    Assessment Comments Johana tolerated treatment well and is ready to begin elevation PREs.  -MP     PT Plan    PT Plan Comments Continue progressing her program with overhead dumbell next visit.  -MP       User Key  (r) = Recorded By, (t) = Taken By, (c) = Cosigned By    Initials Name Provider Type    JOSE Pepper PT Physical Therapist                    Exercises       10/16/17 0800          Subjective Comments    Subjective Comments Johana stated that her shoulder is doing better.  -MP      Subjective Pain    Able to rate subjective pain? yes  -MP      Pre-Treatment Pain Level 0  -MP      Post-Treatment Pain Level 0  -MP      Exercise 1    Exercise Name 1 Refer to land flow sheet  -MP        User Key  (r) = Recorded By, (t) = Taken By, (c) = Cosigned By    Initials Name Provider Type    JOSE Pepper PT Physical Therapist             Manual Rx (last 36 hours)      Manual Treatments       10/16/17 0700          Manual Rx 1    Manual Rx 1 Location R UE   -MP      Manual Rx 1 Type oscillations  -MP      Manual Rx  1 Duration 3 minutes  -MP      Manual Rx 2    Manual Rx 2 Location R GH joint  -MP      Manual Rx 2 Type Inferiro and posterior glides  -MP      Manual Rx 2 Grade 3-4  -MP      Manual Rx 2 Duration 5 x 3 each.  -MP        User Key  (r) = Recorded By, (t) = Taken By, (c) = Cosigned By    Initials Name Provider Type    JOSE Pepper PT Physical Therapist                PT OP Goals       10/16/17 0800       PT Short Term Goals    STG Date to Achieve 11/03/17  -MP     STG 1 Johana is introduced to PROM exercises to improve flexion, ER and IR of the R shoulder.  -MP     STG 1 Progress Partially Met  -MP     STG 2 She begins exercises to strength scapular musculature.  -MP     STG 2 Progress Met  -MP     STG 3 Shoulder wall slides are begun.  -MP     STG 3 Progress Met  -MP     Long Term Goals    LTG Date to Achieve 12/01/17  -MP     LTG 1 Functional AROM of the R shoulder is pain free and equal to the L.  -MP     LTG 1 Progress Ongoing  -MP     LTG 2 Strength, R shoulder, cardinal motions, equals 4+/5.  -MP     LTG 2 Progress Ongoing  -MP     LTG 3 She is able to turn her steering wheel without R shoulder pain.  -MP     LTG 3 Progress Ongoing  -MP     LTG 4 Johana is independent with a complete HEP and self care eduation.  -MP     LTG 4 Progress Ongoing  -MP       User Key  (r) = Recorded By, (t) = Taken By, (c) = Cosigned By    Initials Name Provider Type    JOSE Pepper PT Physical Therapist                Therapy Education       10/16/17 0845          Therapy Education    Given HEP  -MP      Program Reinforced  -MP      How Provided Verbal  -MP      Provided to Patient  -MP      Level of Understanding Verbalized  -MP        User Key  (r) = Recorded By, (t) = Taken By, (c) = Cosigned By    Initials Name Provider Type    JOSE Pepper PT Physical Therapist        Time Calculation:   Start Time: 0805  Stop Time: 0845  Time Calculation (min): 40 min    Therapy Charges for Today     Code Description Service Date  Service Provider Modifiers Qty    05746541307 HC PT THER PROC EA 15 MIN 10/16/2017 Bull Pepper, PT GP 2    49792207910 HC PT MANUAL THERAPY EA 15 MIN 10/16/2017 Bull Pepper, PT GP 1          Bull Pepper, PT  10/16/2017

## 2017-10-19 ENCOUNTER — HOSPITAL ENCOUNTER (OUTPATIENT)
Dept: PHYSICAL THERAPY | Facility: HOSPITAL | Age: 77
Setting detail: THERAPIES SERIES
Discharge: HOME | End: 2017-10-19

## 2017-10-19 DIAGNOSIS — M25.511 PAIN IN JOINT OF RIGHT SHOULDER: Primary | ICD-10-CM

## 2017-10-19 DIAGNOSIS — Z78.9 DIFFICULTY WITH ACTIVITIES OF DAILY LIVING: ICD-10-CM

## 2017-10-19 PROCEDURE — 97110 THERAPEUTIC EXERCISES: CPT | Performed by: PHYSICAL THERAPIST

## 2017-10-19 NOTE — THERAPY TREATMENT NOTE
Outpatient Physical Therapy Ortho Treatment Note  [facility]     Patient Name: Johana Corrigan  : 1940  MRN: [id number]  Today's Date: 10/19/2017      Visit Date: 10/19/2017    Visit Dx:    ICD-10-CM ICD-9-CM   1. Pain in joint of right shoulder M25.511 719.41   2. Difficulty with activities of daily living R53.81 799.3       Patient Active Problem List   Diagnosis   • Atopic rhinitis   • Osteoarthritis   • Fatigue   • Hyperlipidemia   • Hypothyroidism   • Leukopenia   • Subclinical hypothyroidism   • Wound abscess   • Depressed mood        Past Medical History:   Diagnosis Date   • Allergic    • Arthritis    • Cataract    • Headache    • Hyperthyroidism         Past Surgical History:   Procedure Laterality Date   • NOSE SURGERY     • SUBMANDIBULAR GLAND EXCISION     • TONSILLECTOMY     • TUBAL ABDOMINAL LIGATION                 PT Assessment/Plan       10/19/17 0852       PT Assessment    Assessment Comments Johana tolerated resisted elevation with the R shoulder without problem.  She is progressing with exercises, tolerance to ADLs and pain is very nominal currently.  -MP     PT Plan    PT Plan Comments Monitor the effects of today's treatment and continue progressing therapeutic exercsies and education for eventual independent self care.  -MP       User Key  (r) = Recorded By, (t) = Taken By, (c) = Cosigned By    Initials Name Provider Type    JOSE Pepper, PT Physical Therapist                Exercises       10/19/17 0800          Subjective Comments    Subjective Comments Johana stated that she felt the muscles working while doing the overhead press but did not experience pain.  -MP      Subjective Pain    Able to rate subjective pain? yes  -MP      Pre-Treatment Pain Level 0  -MP      Post-Treatment Pain Level 0  -MP      Exercise 1    Exercise Name 1 Refer to land flow sheet  -MP      Exercise 2    Exercise Name 2 Progressed therapeutic exercises with standing overhead press, 1 lb., B, 10 x 2.   -MP        User Key  (r) = Recorded By, (t) = Taken By, (c) = Cosigned By    Initials Name Provider Type    JOSE Pepper PT Physical Therapist               Manual Rx (last 36 hours)      Manual Treatments       10/19/17 0700          Manual Rx 1    Manual Rx 1 Location R Glenohumeral joint  -MP      Manual Rx 1 Type posterior glide  -MP      Manual Rx 1 Grade Grade 3-4  -MP      Manual Rx 1 Duration 5 x 3  -MP        User Key  (r) = Recorded By, (t) = Taken By, (c) = Cosigned By    Initials Name Provider Type    JOSE Pepper PT Physical Therapist                PT OP Goals       10/19/17 0800       PT Short Term Goals    STG Date to Achieve 11/03/17  -MP     STG 1 Johana is introduced to PROM exercises to improve flexion, ER and IR of the R shoulder.  -MP     STG 1 Progress Partially Met  -MP     STG 2 She begins exercises to strength scapular musculature.  -MP     STG 2 Progress Met  -MP     STG 3 Shoulder wall slides are begun.  -MP     STG 3 Progress Met  -MP     Long Term Goals    LTG Date to Achieve 12/01/17  -MP     LTG 1 Functional AROM of the R shoulder is pain free and equal to the L.  -MP     LTG 1 Progress Ongoing  -MP     LTG 2 Strength, R shoulder, cardinal motions, equals 4+/5.  -MP     LTG 2 Progress Ongoing  -MP     LTG 3 She is able to turn her steering wheel without R shoulder pain.  -MP     LTG 3 Progress Ongoing  -MP     LTG 4 Johana is independent with a complete HEP and self care eduation.  -MP     LTG 4 Progress Ongoing  -MP       User Key  (r) = Recorded By, (t) = Taken By, (c) = Cosigned By    Initials Name Provider Type    JOSE Pepper PT Physical Therapist                Therapy Education       10/19/17 0851          Therapy Education    Given HEP;Symptoms/condition management  -MP      Program Reinforced  -MP      How Provided Verbal  -MP      Provided to Patient  -MP      Level of Understanding Verbalized  -MP        User Key  (r) = Recorded By, (t) = Taken By, (c) = Cosigned  By    Initials Name Provider Type    MP Bull Pepper PT Physical Therapist        Time Calculation:   Start Time: 0805  Stop Time: 0845  Time Calculation (min): 40 min    Therapy Charges for Today     Code Description Service Date Service Provider Modifiers Qty    34468211223 HC PT THER PROC EA 15 MIN 10/19/2017 Bull Pepper PT GP 3        Bull Pepper PT  10/19/2017

## 2017-10-23 ENCOUNTER — HOSPITAL ENCOUNTER (OUTPATIENT)
Dept: PHYSICAL THERAPY | Facility: HOSPITAL | Age: 77
Setting detail: THERAPIES SERIES
Discharge: HOME | End: 2017-10-23

## 2017-10-23 DIAGNOSIS — Z78.9 DIFFICULTY WITH ACTIVITIES OF DAILY LIVING: ICD-10-CM

## 2017-10-23 DIAGNOSIS — M25.511 PAIN IN JOINT OF RIGHT SHOULDER: Primary | ICD-10-CM

## 2017-10-23 PROCEDURE — 97140 MANUAL THERAPY 1/> REGIONS: CPT | Performed by: PHYSICAL THERAPIST

## 2017-10-23 PROCEDURE — 97110 THERAPEUTIC EXERCISES: CPT | Performed by: PHYSICAL THERAPIST

## 2017-10-23 NOTE — THERAPY TREATMENT NOTE
Outpatient Physical Therapy Ortho Treatment Note  [facility]     Patient Name: Johana Corrigan  : 1940  MRN: [id number]  Today's Date: 10/23/2017      Visit Date: 10/23/2017    Visit Dx:    ICD-10-CM ICD-9-CM   1. Pain in joint of right shoulder M25.511 719.41   2. Difficulty with activities of daily living R53.81 799.3       Patient Active Problem List   Diagnosis   • Atopic rhinitis   • Osteoarthritis   • Fatigue   • Hyperlipidemia   • Hypothyroidism   • Leukopenia   • Subclinical hypothyroidism   • Wound abscess   • Depressed mood        Past Medical History:   Diagnosis Date   • Allergic    • Arthritis    • Cataract    • Headache    • Hyperthyroidism         Past Surgical History:   Procedure Laterality Date   • NOSE SURGERY     • SUBMANDIBULAR GLAND EXCISION     • TONSILLECTOMY     • TUBAL ABDOMINAL LIGATION                 PT Assessment/Plan       10/23/17 0842       PT Assessment    Assessment Comments Johana tolerated treatment well.  She progressed her HEP and displayed good technique with the passive flexion in supine.  -MP     PT Plan    PT Plan Comments Monitor the effect of today's progressions and make appropriate adjustments.  -MP       User Key  (r) = Recorded By, (t) = Taken By, (c) = Cosigned By    Initials Name Provider Type    JOSE Pepper, PT Physical Therapist                Exercises       10/23/17 0800          Subjective Comments    Subjective Comments Johana noticed one instance of pain over the weekend with her R shoulder.  It was when she was turning the steering wheel.  -MP      Subjective Pain    Able to rate subjective pain? yes  -MP      Pre-Treatment Pain Level 0  -MP      Post-Treatment Pain Level 0  -MP      Exercise 1    Exercise Name 1 Refer to land flow sheet  -MP        User Key  (r) = Recorded By, (t) = Taken By, (c) = Cosigned By    Initials Name Provider Type    JOSE Pepper PT Physical Therapist             Manual Rx (last 36 hours)      Manual Treatments        10/23/17 0700          Manual Rx 1    Manual Rx 1 Location R Glenohumeral joint  -MP      Manual Rx 1 Type posterior glide  -MP      Manual Rx 1 Grade Grade 3-4  -MP      Manual Rx 1 Duration 5 x 3  -MP      Manual Rx 2    Manual Rx 2 Location R shoulder  -MP      Manual Rx 2 Type passive flexion stretch  -MP      Manual Rx 2 Duration 30s x 3  -MP        User Key  (r) = Recorded By, (t) = Taken By, (c) = Cosigned By    Initials Name Provider Type    JOSE Pepper PT Physical Therapist                PT OP Goals       10/23/17 0800       PT Short Term Goals    STG Date to Achieve 11/03/17  -MP     STG 1 Johana is introduced to PROM exercises to improve flexion, ER and IR of the R shoulder.  -MP     STG 1 Progress Partially Met  -MP     STG 2 She begins exercises to strength scapular musculature.  -MP     STG 2 Progress Met  -MP     STG 3 Shoulder wall slides are begun.  -MP     STG 3 Progress Met  -MP     Long Term Goals    LTG Date to Achieve 12/01/17  -MP     LTG 1 Functional AROM of the R shoulder is pain free and equal to the L.  -MP     LTG 1 Progress Ongoing  -MP     LTG 2 Strength, R shoulder, cardinal motions, equals 4+/5.  -MP     LTG 2 Progress Ongoing  -MP     LTG 3 She is able to turn her steering wheel without R shoulder pain.  -MP     LTG 3 Progress Ongoing  -MP     LTG 4 Johana is independent with a complete HEP and self care eduation.  -MP     LTG 4 Progress Ongoing  -MP       User Key  (r) = Recorded By, (t) = Taken By, (c) = Cosigned By    Initials Name Provider Type    JOSE Pepper PT Physical Therapist                Therapy Education       10/23/17 0842 10/23/17 0841       Therapy Education    Education Details Shoulder flexion, passive, added to her HEP.  -MP      Given HEP  -MP HEP;Symptoms/condition management  -MP     Program New  -MP Reinforced  -MP     How Provided Written  -MP Verbal  -MP     Provided to Patient  -MP Patient  -MP     Level of Understanding Teach back education  performed  -MP Verbalized  -MP       User Key  (r) = Recorded By, (t) = Taken By, (c) = Cosigned By    Initials Name Provider Type    MP Bull Pepper PT Physical Therapist        Time Calculation:   Start Time: 0800  Stop Time: 0845  Time Calculation (min): 45 min    Therapy Charges for Today     Code Description Service Date Service Provider Modifiers Qty    59251304785  PT THER PROC EA 15 MIN 10/23/2017 Bull Pepper PT GP 2    32809700741 HC PT MANUAL THERAPY EA 15 MIN 10/23/2017 Bull Pepper PT GP 1        Bull Pepper PT  10/23/2017

## 2017-10-26 ENCOUNTER — HOSPITAL ENCOUNTER (OUTPATIENT)
Dept: PHYSICAL THERAPY | Facility: HOSPITAL | Age: 77
Setting detail: THERAPIES SERIES
Discharge: HOME | End: 2017-10-26

## 2017-10-26 DIAGNOSIS — Z78.9 DIFFICULTY WITH ACTIVITIES OF DAILY LIVING: ICD-10-CM

## 2017-10-26 DIAGNOSIS — M25.511 PAIN IN JOINT OF RIGHT SHOULDER: Primary | ICD-10-CM

## 2017-10-26 PROCEDURE — 97110 THERAPEUTIC EXERCISES: CPT | Performed by: PHYSICAL THERAPIST

## 2017-10-26 NOTE — THERAPY TREATMENT NOTE
Outpatient Physical Therapy Ortho Treatment Note  [facility]     Patient Name: Johana Corrigan  : 1940  MRN: [id number]  Today's Date: 10/26/2017      Visit Date: 10/26/2017    Visit Dx:    ICD-10-CM ICD-9-CM   1. Pain in joint of right shoulder M25.511 719.41   2. Difficulty with activities of daily living R53.81 799.3       Patient Active Problem List   Diagnosis   • Atopic rhinitis   • Osteoarthritis   • Fatigue   • Hyperlipidemia   • Hypothyroidism   • Leukopenia   • Subclinical hypothyroidism   • Wound abscess   • Depressed mood        Past Medical History:   Diagnosis Date   • Allergic    • Arthritis    • Cataract    • Headache    • Hyperthyroidism         Past Surgical History:   Procedure Laterality Date   • NOSE SURGERY     • SUBMANDIBULAR GLAND EXCISION     • TONSILLECTOMY     • TUBAL ABDOMINAL LIGATION               PT Assessment/Plan       10/26/17 0935       PT Assessment    Assessment Comments Johana responded well to the treatment today.  We progressed therapeutic exercises for the shoulder with seated chest press and began leg press for LE strengtheining and for bone density issue prevention.  -MP     PT Plan    PT Plan Comments Continue progressing therapeutic exercises as tolerated.  If she is doing well next week will dishcarge to indepedent self care.  -MP       User Key  (r) = Recorded By, (t) = Taken By, (c) = Cosigned By    Initials Name Provider Type    JOSE Pepper, PT Physical Therapist                Exercises       10/26/17 0900          Subjective Comments    Subjective Comments Johana reported that the R shoulder is better.  Pain is gone but she still has weakness and inability to do things overhead as much as her L UE.  -MP      Subjective Pain    Able to rate subjective pain? yes  -MP      Pre-Treatment Pain Level 0  -MP      Exercise 1    Exercise Name 1 Refer to land flow sheet  -MP      Exercise 2    Exercise Name 2 Progressed therapeutic exercises with seated chest  press, 10 lbs. 10 x 2 and Leg press 40 lbs. 10 x 2.  -MP        User Key  (r) = Recorded By, (t) = Taken By, (c) = Cosigned By    Initials Name Provider Type    JOSE Pepper PT Physical Therapist                PT OP Goals       10/26/17 0900       PT Short Term Goals    STG Date to Achieve 11/03/17  -MP     STG 1 Johana is introduced to PROM exercises to improve flexion, ER and IR of the R shoulder.  -MP     STG 1 Progress Partially Met  -MP     STG 2 She begins exercises to strength scapular musculature.  -MP     STG 2 Progress Met  -MP     STG 3 Shoulder wall slides are begun.  -MP     STG 3 Progress Met  -MP     Long Term Goals    LTG Date to Achieve 12/01/17  -MP     LTG 1 Functional AROM of the R shoulder is pain free and equal to the L.  -MP     LTG 1 Progress Ongoing  -MP     LTG 2 Strength, R shoulder, cardinal motions, equals 4+/5.  -MP     LTG 2 Progress Ongoing  -MP     LTG 3 She is able to turn her steering wheel without R shoulder pain.  -MP     LTG 3 Progress Ongoing  -MP     LTG 4 Johana is independent with a complete HEP and self care eduation.  -MP     LTG 4 Progress Ongoing  -MP       User Key  (r) = Recorded By, (t) = Taken By, (c) = Cosigned By    Initials Name Provider Type    JOSE Pepper PT Physical Therapist                Therapy Education       10/26/17 0935          Therapy Education    Given HEP;Symptoms/condition management  -MP      Program Reinforced  -MP      How Provided Verbal  -MP      Provided to Patient  -MP      Level of Understanding Verbalized  -MP        User Key  (r) = Recorded By, (t) = Taken By, (c) = Cosigned By    Initials Name Provider Type    JOSE Pepper PT Physical Therapist        Time Calculation:   Start Time: 0845  Stop Time: 0930  Time Calculation (min): 45 min    Therapy Charges for Today     Code Description Service Date Service Provider Modifiers Qty    17965335035 HC PT THER PROC EA 15 MIN 10/26/2017 Bull Pepper PT GP 3          Bull Pepper  PT  10/26/2017

## 2017-10-31 ENCOUNTER — HOSPITAL ENCOUNTER (OUTPATIENT)
Dept: PHYSICAL THERAPY | Facility: HOSPITAL | Age: 77
Setting detail: THERAPIES SERIES
Discharge: HOME | End: 2017-10-31

## 2017-10-31 DIAGNOSIS — M25.511 PAIN IN JOINT OF RIGHT SHOULDER: Primary | ICD-10-CM

## 2017-10-31 DIAGNOSIS — Z78.9 DIFFICULTY WITH ACTIVITIES OF DAILY LIVING: ICD-10-CM

## 2017-10-31 PROCEDURE — 97110 THERAPEUTIC EXERCISES: CPT | Performed by: PHYSICAL THERAPIST

## 2017-11-02 ENCOUNTER — HOSPITAL ENCOUNTER (OUTPATIENT)
Dept: PHYSICAL THERAPY | Facility: HOSPITAL | Age: 77
Setting detail: THERAPIES SERIES
Discharge: HOME | End: 2017-11-02

## 2017-11-02 DIAGNOSIS — M25.511 PAIN IN JOINT OF RIGHT SHOULDER: Primary | ICD-10-CM

## 2017-11-02 DIAGNOSIS — Z78.9 DIFFICULTY WITH ACTIVITIES OF DAILY LIVING: ICD-10-CM

## 2017-11-02 PROCEDURE — G8986 CARRY D/C STATUS: HCPCS | Performed by: PHYSICAL THERAPIST

## 2017-11-02 PROCEDURE — 97110 THERAPEUTIC EXERCISES: CPT | Performed by: PHYSICAL THERAPIST

## 2017-11-02 PROCEDURE — G8985 CARRY GOAL STATUS: HCPCS | Performed by: PHYSICAL THERAPIST

## 2017-11-02 NOTE — THERAPY DISCHARGE NOTE
Outpatient Physical Therapy Ortho Treatment Note/Discharge Summary  [facility]     Patient Name: Johana Corrigan  : 1940  MRN: [id number]  Today's Date: 2017      Visit Date: 2017    Visit Dx:    ICD-10-CM ICD-9-CM   1. Pain in joint of right shoulder M25.511 719.41   2. Difficulty with activities of daily living R53.81 799.3       Patient Active Problem List   Diagnosis   • Atopic rhinitis   • Osteoarthritis   • Fatigue   • Hyperlipidemia   • Hypothyroidism   • Leukopenia   • Subclinical hypothyroidism   • Wound abscess   • Depressed mood        Past Medical History:   Diagnosis Date   • Allergic    • Arthritis    • Cataract    • Headache    • Hyperthyroidism         Past Surgical History:   Procedure Laterality Date   • NOSE SURGERY     • SUBMANDIBULAR GLAND EXCISION     • TONSILLECTOMY     • TUBAL ABDOMINAL LIGATION                     Exercises       17 1600          Subjective Comments    Subjective Comments Johana reports that her R shoulder is doing very well and she is ready for discharge.  -MP      Subjective Pain    Able to rate subjective pain? yes  -MP      Pre-Treatment Pain Level 0  -MP      Post-Treatment Pain Level 0  -MP      Exercise 1    Exercise Name 1 Refer to land flow sheet  -MP      Exercise 2    Exercise Name 2 Progressed her whole body exercise program by adding step ups, 4 inch, 10 x 2 B.  -MP        User Key  (r) = Recorded By, (t) = Taken By, (c) = Cosigned By    Initials Name Provider Type    JOSE Pepper, PT Physical Therapist                PT OP Goals       17 1600       PT Short Term Goals    STG Date to Achieve 17  -MP     STG 1 Johana is introduced to PROM exercises to improve flexion, ER and IR of the R shoulder.  -MP     STG 1 Progress Met  -MP     STG 2 She begins exercises to strength scapular musculature.  -MP     STG 2 Progress Met  -MP     STG 3 Shoulder wall slides are begun.  -MP     STG 3 Progress Met  -MP     Long Term Goals     LTG Date to Achieve 12/01/17  -MP     LTG 1 Functional AROM of the R shoulder is pain free and equal to the L.  -MP     LTG 1 Progress Met  -MP     LTG 2 Strength, R shoulder, cardinal motions, equals 4+/5.  -MP     LTG 2 Progress Met  -MP     LTG 3 She is able to turn her steering wheel without R shoulder pain.  -MP     LTG 3 Progress Met  -MP     LTG 4 Johana is independent with a complete HEP and self care eduation.  -MP     LTG 4 Progress Met  -MP       User Key  (r) = Recorded By, (t) = Taken By, (c) = Cosigned By    Initials Name Provider Type    JOSE Pepper PT Physical Therapist                Therapy Education       11/02/17 1637          Therapy Education    Education Details Reviewed her HEP and designed a [facility] workout program for her.  -MP      Given HEP;Symptoms/condition management  -MP      Program New  -MP      How Provided Written  -MP      Provided to Patient  -MP      Level of Understanding Teach back education performed  -MP        User Key  (r) = Recorded By, (t) = Taken By, (c) = Cosigned By    Initials Name Provider Type    JOSE Pepper PT Physical Therapist                Outcome Measures       11/02/17 1600          Functional Assessment    Outcome Measure Options Disabilities of the Arm, Shoulder, and Hand (DASH)   1.7% disability  -MP        User Key  (r) = Recorded By, (t) = Taken By, (c) = Cosigned By    Initials Name Provider Type    JOSE Pepper PT Physical Therapist            Time Calculation:   Start Time: 0800  Stop Time: 0845  Time Calculation (min): 45 min    Therapy Charges for Today     Code Description Service Date Service Provider Modifiers Qty    63251979648 HC PT CARRY MOV HAND OBJ PROJECTED 11/2/2017 Bull Pepper PT GP, CI 1    71081155467 HC PT CARRY MOV HAND OBJ DISCHARGE 11/2/2017 Bull Pepper PT GP, CI 1    19303986865 HC PT THER PROC EA 15 MIN 11/2/2017 Bull Pepper PT GP 3          PT G-Codes  Outcome Measure Options: Disabilities of the  Arm, Shoulder, and Hand (DASH) (1.7% disability)  Score: DASH, 1.7% disabiliby  Functional Limitation: Carrying, moving and handling objects  Carrying, Moving and Handling Objects Goal Status (): At least 1 percent but less than 20 percent impaired, limited or restricted  Carrying, Moving and Handling Objects Discharge Status (): At least 1 percent but less than 20 percent impaired, limited or restricted     OP PT Discharge Summary  Date of Discharge: 11/02/17  Reason for Discharge: All goals achieved  Outcomes Achieved: Able to achieve all goals within established timeline  Discharge Destination: Home with home program  Discharge Instructions: Johana is doing much better and independent with all self care.      Bull Pepper, PT  11/2/2017

## 2017-11-03 ENCOUNTER — HOSPITAL ENCOUNTER (OUTPATIENT)
Dept: MAMMOGRAPHY | Facility: HOSPITAL | Age: 77
Discharge: HOME | End: 2017-11-03
Admitting: INTERNAL MEDICINE

## 2017-11-03 DIAGNOSIS — Z12.31 VISIT FOR SCREENING MAMMOGRAM: ICD-10-CM

## 2017-11-03 PROCEDURE — 77063 BREAST TOMOSYNTHESIS BI: CPT

## 2017-11-03 PROCEDURE — G0202 SCR MAMMO BI INCL CAD: HCPCS

## 2017-11-26 DIAGNOSIS — E78.5 HYPERLIPIDEMIA, UNSPECIFIED HYPERLIPIDEMIA TYPE: Primary | ICD-10-CM

## 2017-11-26 DIAGNOSIS — D72.819 LEUKOPENIA, UNSPECIFIED TYPE: ICD-10-CM

## 2017-11-26 DIAGNOSIS — E03.9 HYPOTHYROIDISM, UNSPECIFIED TYPE: ICD-10-CM

## 2017-12-01 LAB
ALBUMIN SERPL-MCNC: 4.8 G/DL (ref 3.5–5.2)
ALBUMIN/GLOB SERPL: 2.1 G/DL
ALP SERPL-CCNC: 68 U/L (ref 39–117)
ALT SERPL-CCNC: 20 U/L (ref 1–33)
AST SERPL-CCNC: 25 U/L (ref 1–32)
BASOPHILS # BLD AUTO: 0.04 10*3/MM3 (ref 0–0.2)
BASOPHILS NFR BLD AUTO: 0.9 % (ref 0–1.5)
BILIRUB SERPL-MCNC: 0.4 MG/DL (ref 0.1–1.2)
BUN SERPL-MCNC: 14 MG/DL (ref 8–23)
BUN/CREAT SERPL: 17.9 (ref 7–25)
CALCIUM SERPL-MCNC: 10 MG/DL (ref 8.6–10.5)
CHLORIDE SERPL-SCNC: 103 MMOL/L (ref 98–107)
CHOLEST SERPL-MCNC: 275 MG/DL (ref 0–200)
CO2 SERPL-SCNC: 29 MMOL/L (ref 22–29)
CREAT SERPL-MCNC: 0.78 MG/DL (ref 0.57–1)
EOSINOPHIL # BLD AUTO: 0.1 10*3/MM3 (ref 0–0.7)
EOSINOPHIL NFR BLD AUTO: 2.2 % (ref 0.3–6.2)
ERYTHROCYTE [DISTWIDTH] IN BLOOD BY AUTOMATED COUNT: 13.6 % (ref 11.7–13)
GFR SERPLBLD CREATININE-BSD FMLA CKD-EPI: 72 ML/MIN/1.73
GFR SERPLBLD CREATININE-BSD FMLA CKD-EPI: 87 ML/MIN/1.73
GLOBULIN SER CALC-MCNC: 2.3 GM/DL
GLUCOSE SERPL-MCNC: 94 MG/DL (ref 65–99)
HCT VFR BLD AUTO: 46.4 % (ref 35.6–45.5)
HDLC SERPL-MCNC: 96 MG/DL (ref 40–60)
HGB BLD-MCNC: 15.3 G/DL (ref 11.9–15.5)
IMM GRANULOCYTES # BLD: 0 10*3/MM3 (ref 0–0.03)
IMM GRANULOCYTES NFR BLD: 0 % (ref 0–0.5)
LDLC SERPL CALC-MCNC: 165 MG/DL (ref 0–100)
LDLC/HDLC SERPL: 1.72 {RATIO}
LYMPHOCYTES # BLD AUTO: 1.84 10*3/MM3 (ref 0.9–4.8)
LYMPHOCYTES NFR BLD AUTO: 40.4 % (ref 19.6–45.3)
MCH RBC QN AUTO: 31.9 PG (ref 26.9–32)
MCHC RBC AUTO-ENTMCNC: 33 G/DL (ref 32.4–36.3)
MCV RBC AUTO: 96.7 FL (ref 80.5–98.2)
MONOCYTES # BLD AUTO: 0.36 10*3/MM3 (ref 0.2–1.2)
MONOCYTES NFR BLD AUTO: 7.9 % (ref 5–12)
NEUTROPHILS # BLD AUTO: 2.22 10*3/MM3 (ref 1.9–8.1)
NEUTROPHILS NFR BLD AUTO: 48.6 % (ref 42.7–76)
PLATELET # BLD AUTO: 227 10*3/MM3 (ref 140–500)
POTASSIUM SERPL-SCNC: 4 MMOL/L (ref 3.5–5.2)
PROT SERPL-MCNC: 7.1 G/DL (ref 6–8.5)
RBC # BLD AUTO: 4.8 10*6/MM3 (ref 3.9–5.2)
SODIUM SERPL-SCNC: 144 MMOL/L (ref 136–145)
TRIGL SERPL-MCNC: 68 MG/DL (ref 0–150)
TSH SERPL DL<=0.005 MIU/L-ACNC: 3.45 MIU/ML (ref 0.27–4.2)
VLDLC SERPL CALC-MCNC: 13.6 MG/DL (ref 5–40)
WBC # BLD AUTO: 4.56 10*3/MM3 (ref 4.5–10.7)

## 2017-12-08 ENCOUNTER — OFFICE VISIT (OUTPATIENT)
Dept: INTERNAL MEDICINE | Facility: CLINIC | Age: 77
End: 2017-12-08

## 2017-12-08 VITALS
BODY MASS INDEX: 27.21 KG/M2 | WEIGHT: 144 LBS | HEART RATE: 81 BPM | OXYGEN SATURATION: 95 % | SYSTOLIC BLOOD PRESSURE: 130 MMHG | DIASTOLIC BLOOD PRESSURE: 60 MMHG

## 2017-12-08 DIAGNOSIS — E03.9 HYPOTHYROIDISM, UNSPECIFIED TYPE: ICD-10-CM

## 2017-12-08 DIAGNOSIS — E78.5 HYPERLIPIDEMIA, UNSPECIFIED HYPERLIPIDEMIA TYPE: ICD-10-CM

## 2017-12-08 DIAGNOSIS — Z00.00 HEALTHCARE MAINTENANCE: ICD-10-CM

## 2017-12-08 DIAGNOSIS — N39.0 URINARY TRACT INFECTION WITHOUT HEMATURIA, SITE UNSPECIFIED: Primary | ICD-10-CM

## 2017-12-08 LAB
BILIRUB BLD-MCNC: NEGATIVE MG/DL
CLARITY, POC: CLEAR
COLOR UR: YELLOW
GLUCOSE UR STRIP-MCNC: NEGATIVE MG/DL
KETONES UR QL: NEGATIVE
LEUKOCYTE EST, POC: ABNORMAL
NITRITE UR-MCNC: NEGATIVE MG/ML
PH UR: 6 [PH] (ref 5–8)
PROT UR STRIP-MCNC: ABNORMAL MG/DL
RBC # UR STRIP: ABNORMAL /UL
SP GR UR: 1.02 (ref 1–1.03)
UROBILINOGEN UR QL: NORMAL

## 2017-12-08 PROCEDURE — 99213 OFFICE O/P EST LOW 20 MIN: CPT | Performed by: INTERNAL MEDICINE

## 2017-12-08 PROCEDURE — 81003 URINALYSIS AUTO W/O SCOPE: CPT | Performed by: INTERNAL MEDICINE

## 2017-12-08 PROCEDURE — 99397 PER PM REEVAL EST PAT 65+ YR: CPT | Performed by: INTERNAL MEDICINE

## 2017-12-08 RX ORDER — LEVOTHYROXINE SODIUM 0.07 MG/1
75 TABLET ORAL DAILY
Qty: 90 TABLET | Refills: 3 | Status: SHIPPED | OUTPATIENT
Start: 2017-12-08 | End: 2018-01-05 | Stop reason: SDUPTHER

## 2017-12-08 RX ORDER — MELOXICAM 15 MG/1
15 TABLET ORAL DAILY
COMMUNITY
End: 2017-12-08

## 2017-12-08 RX ORDER — NITROFURANTOIN 25; 75 MG/1; MG/1
100 CAPSULE ORAL 2 TIMES DAILY
Qty: 10 CAPSULE | Refills: 0 | Status: SHIPPED | OUTPATIENT
Start: 2017-12-08 | End: 2018-05-21

## 2017-12-08 RX ORDER — ROSUVASTATIN CALCIUM 5 MG/1
5 TABLET, COATED ORAL DAILY
Qty: 90 TABLET | Refills: 3 | Status: SHIPPED | OUTPATIENT
Start: 2017-12-08 | End: 2018-01-05 | Stop reason: SDUPTHER

## 2017-12-08 NOTE — PATIENT INSTRUCTIONS
Medicare Wellness  Personal Prevention Plan of Service     Date of Office Visit:  2017  Encounter Provider:  Sola Gómez MD  Place of Service:  [facility] INTER[facility]  Patient Name: Johana Corrigan  :  1940    As part of the Medicare Wellness portion of your visit today, we are providing you with this personalized preventive plan of services (PPPS). This plan is based upon recommendations of the [place] Preventive Services Task Force (USPSTF) and the Advisory Committee on Immunization Practices (ACIP).    This lists the preventive care services that should be considered, and provides dates of when you are due. Items listed as completed are up-to-date and do not require any further intervention.    Health Maintenance   Topic Date Due   • LIPID PANEL  2018   • MAMMOGRAM  2019   • TDAP/TD VACCINES (2 - Td) 10/29/2023   • COLONOSCOPY  2025   • INFLUENZA VACCINE  Completed   • PNEUMOCOCCAL VACCINES (65+ LOW/MEDIUM RISK)  Completed   • ZOSTER VACCINE  Completed       Orders Placed This Encounter   Procedures   • Urine Culture - Urine, Urine, Clean Catch   • POC Urinalysis Dipstick, Automated   • ECG 12 Lead     Order Specific Question:   Reason for Exam:     Answer:   hm       Return in about 6 months (around 2018) for Recheck.

## 2017-12-08 NOTE — PROGRESS NOTES
Subjective   AWV  Hypothyroidism, hyperlipidemia, dysuria    Johana Corrigan is a 77 y.o. female who presents for an annual wellness visit. She is feeling well today. She does report urinary frequency present for several weeks. She may have a mild amount of urinary incontinence in the evening or if waits too long to use the restroom. Drinking 3 cups of coffee daily. etoh consumption 2-7 glasses wine/ week. More recently.   Has hyperlipidemia. Tried off crestor given normal lipid levels. Now lipids increased w ldl 165. She has hypothyroidism. tsh is elevated at this time.         Review of Systems   Constitutional: Negative.    HENT: Negative.    Eyes: Negative.    Respiratory: Negative.    Cardiovascular: Negative.    Gastrointestinal: Negative.    Endocrine: Negative.    Genitourinary: Positive for frequency.   Musculoskeletal: Negative.    Skin: Negative.    Allergic/Immunologic: Negative.    Neurological: Negative.    Hematological: Negative.    Psychiatric/Behavioral: Negative.        The following portions of the patient's history were reviewed and updated as appropriate: allergies, current medications, past family history, past medical history, past social history, past surgical history and problem list.     Patient Active Problem List   Diagnosis   • Atopic rhinitis   • Osteoarthritis   • Hyperlipidemia   • Hypothyroidism   • Leukopenia   • Depressed mood       Past Medical History:   Diagnosis Date   • Allergic    • Arthritis    • Cataract    • Headache    • Hyperthyroidism        Past Surgical History:   Procedure Laterality Date   • NOSE SURGERY     • SUBMANDIBULAR GLAND EXCISION     • TONSILLECTOMY     • TUBAL ABDOMINAL LIGATION         Family History   Problem Relation Age of Onset   • Heart disease Father    • Heart disease Brother    • Heart disease Mother        Social History     Social History   • Marital status:      Spouse name: N/A   • Number of children: N/A   • Years of education: N/A      Occupational History   • Not on file.     Social History Main Topics   • Smoking status: Former Smoker   • Smokeless tobacco: Never Used   • Alcohol use Yes   • Drug use: No   • Sexual activity: Not on file     Other Topics Concern   • Not on file     Social History Narrative   • No narrative on file       Current Outpatient Prescriptions on File Prior to Visit   Medication Sig Dispense Refill   • fexofenadine (ALLEGRA) 180 MG tablet Take  by mouth.     • [DISCONTINUED] levothyroxine (SYNTHROID, LEVOTHROID) 50 MCG tablet Take 1 tablet by mouth Daily. 90 tablet 0   • [DISCONTINUED] aspirin 81 MG tablet Take 1 tablet by mouth.     • [DISCONTINUED] cephalexin (KEFLEX) 500 MG capsule Take 1 capsule by mouth 2 (Two) Times a Day. 14 capsule 0   • [DISCONTINUED] meclizine (ANTIVERT) 25 MG tablet Take 1 tablet by mouth 3 (Three) Times a Day As Needed for dizziness. 30 tablet 2   • [DISCONTINUED] MethylPREDNISolone (MEDROL, BOGDAN,) 4 MG tablet Take as directed on package instructions. 21 tablet 0   • [DISCONTINUED] mupirocin (BACTROBAN) 2 % ointment Apply  topically 3 (Three) Times a Day. 15 g 0   • [DISCONTINUED] phenazopyridine (PYRIDIUM) 200 MG tablet One tablet 3 x's a day as needed for symptoms 10 tablet 0   • [DISCONTINUED] rosuvastatin (CRESTOR) 5 MG tablet Take  by mouth.       Current Facility-Administered Medications on File Prior to Visit   Medication Dose Route Frequency Provider Last Rate Last Dose   • cyanocobalamin injection 1,000 mcg  1,000 mcg Intramuscular Q28 Days Sola Gómez MD   1,000 mcg at 07/11/16 1305       No Known Allergies    Immunization History   Administered Date(s) Administered   • Flu Vaccine High Dose PF 65YR+ 10/04/2016, 10/04/2017   • Pneumococcal Conjugate 13-Valent 10/04/2016   • Pneumococcal Polysaccharide 02/01/2010   • Tdap 10/29/2013   • Zoster 02/01/2010       Objective     /60  Pulse 81  Wt 65.3 kg (144 lb)  SpO2 95%  BMI 27.21 kg/m2    Physical Exam    Constitutional: She is oriented to person, place, and time. She appears well-developed and well-nourished.   HENT:   Head: Normocephalic and atraumatic.   Right Ear: External ear normal.   Left Ear: External ear normal.   Nose: Nose normal.   Mouth/Throat: Oropharynx is clear and moist.   Eyes: Conjunctivae and EOM are normal. Pupils are equal, round, and reactive to light.   Neck: Normal range of motion. Neck supple.   Cardiovascular: Normal rate, regular rhythm and normal heart sounds.    Pulmonary/Chest: Effort normal and breath sounds normal. No respiratory distress.   Abdominal: Soft. Bowel sounds are normal.   Musculoskeletal: Normal range of motion.   Neurological: She is alert and oriented to person, place, and time.   Skin: Skin is warm and dry.   Psychiatric: She has a normal mood and affect. Her behavior is normal. Judgment and thought content normal.   Nursing note and vitals reviewed.    EKG for hyperlipidemia, hypothyroidism. Sinus 68 bpm. rbbb o/w normal st tracing. Unchanged 2015 w/ neg stress testing at that time.  No cognitive impairment.     Assessment/Plan   Johana was seen today for annual exam and urinary tract infection.    Diagnoses and all orders for this visit:    Urinary tract infection without hematuria, site unspecified  -     POC Urinalysis Dipstick, Automated  -     Urine Culture - Urine, Urine, Clean Catch    Healthcare maintenance    Hyperlipidemia, unspecified hyperlipidemia type  -     ECG 12 Lead    Hypothyroidism, unspecified type    Other orders  -     rosuvastatin (CRESTOR) 5 MG tablet; Take 1 tablet by mouth Daily.  -     levothyroxine (SYNTHROID) 75 MCG tablet; Take 1 tablet by mouth Daily.  -     nitrofurantoin, macrocrystal-monohydrate, (MACROBID) 100 MG capsule; Take 1 capsule by mouth 2 (Two) Times a Day.        Discussion    AWV.  See scanned forms for maureen history, PHQ-9, functional ability questionnaire, cognitive impairment screening and preventive services check  list.  These were all reviewed with the patient and the patient was provided with a copy of the preventative services checklist. Direct observation of cognitive ability was evaluated and is normal. Patient was given health advice or handouts on the following:  nutrition, fall prevention, exercise, weight management.   Patient has UTI. Will give macrobid 1 tab bid for 5 days. Will test urinary culture as well. She is given a bladder matters booklet and emphasized lifestyle modifications (caffeine, alcohol, artificial sweetener reduction). keigal exercises.     She has hypothyroidism. Will increase synthroid to 75 mcg daily and monitor levels. Retest tsh and free t4 in 8 weeks.   She will restart crestor for hyperlipidemia. Will continue a healthy diet w/ routine fitness.  Follow up 6 mo and 1 year. Labs prior.                No future appointments.

## 2017-12-12 LAB
BACTERIA UR CULT: ABNORMAL
BACTERIA UR CULT: ABNORMAL
OTHER ANTIBIOTIC SUSC ISLT: ABNORMAL

## 2018-01-05 RX ORDER — ROSUVASTATIN CALCIUM 5 MG/1
5 TABLET, COATED ORAL DAILY
Qty: 90 TABLET | Refills: 3 | Status: SHIPPED | OUTPATIENT
Start: 2018-01-05 | End: 2020-01-27 | Stop reason: SDUPTHER

## 2018-01-05 RX ORDER — LEVOTHYROXINE SODIUM 0.07 MG/1
75 TABLET ORAL DAILY
Qty: 90 TABLET | Refills: 3 | Status: SHIPPED | OUTPATIENT
Start: 2018-01-05 | End: 2018-12-11 | Stop reason: SDUPTHER

## 2018-01-10 ENCOUNTER — TELEPHONE (OUTPATIENT)
Dept: INTERNAL MEDICINE | Facility: CLINIC | Age: 78
End: 2018-01-10

## 2018-01-12 ENCOUNTER — TELEPHONE (OUTPATIENT)
Dept: INTERNAL MEDICINE | Facility: CLINIC | Age: 78
End: 2018-01-12

## 2018-01-12 DIAGNOSIS — M25.511 CHRONIC RIGHT SHOULDER PAIN: Primary | ICD-10-CM

## 2018-01-12 DIAGNOSIS — G89.29 CHRONIC RIGHT SHOULDER PAIN: Primary | ICD-10-CM

## 2018-01-12 NOTE — TELEPHONE ENCOUNTER
Spoke with patient and informed her I would let staff know she needs a referral (due to new insurance) to see Melinat, Deyanira Craig. Has pronounced bone in shoulder she needs  evaluated.    Patient says she has seen doctor before & she's in [facility]. Also says her appt with that office is week after next & will need referral prior to appt.    Note left on Rosalinda's desk about referral.

## 2018-01-12 NOTE — TELEPHONE ENCOUNTER
Needs referral to see Dr. Tawnya Craig (ortho). Says she needs her shoulder evaluated. Her new insurance requires a referral & Dr. Craig is in [facility].    [phone]

## 2018-01-24 ENCOUNTER — TELEPHONE (OUTPATIENT)
Dept: INTERNAL MEDICINE | Facility: CLINIC | Age: 78
End: 2018-01-24

## 2018-01-24 NOTE — TELEPHONE ENCOUNTER
FYI PURPOSES    Patient calling office inquiring about monthly statement received.    Per patient DOS 12/8/18 should be billed as a physical.  Patient had [employer] at the time of service    I reviewed the patient chart and office encounter--looks like the physical was billed as Subsequent Medicare AWV.  I sent an additional inquiry to coding team.    I tried to call the patient back--telephone recording states mail box full--therefore I am unable to leave a message.

## 2018-05-21 ENCOUNTER — OFFICE VISIT (OUTPATIENT)
Dept: INTERNAL MEDICINE | Facility: CLINIC | Age: 78
End: 2018-05-21

## 2018-05-21 VITALS
TEMPERATURE: 97.8 F | BODY MASS INDEX: 26.47 KG/M2 | DIASTOLIC BLOOD PRESSURE: 64 MMHG | WEIGHT: 140 LBS | SYSTOLIC BLOOD PRESSURE: 120 MMHG | HEART RATE: 70 BPM | OXYGEN SATURATION: 97 %

## 2018-05-21 DIAGNOSIS — S81.801A LEG WOUND, RIGHT, INITIAL ENCOUNTER: ICD-10-CM

## 2018-05-21 DIAGNOSIS — E03.9 HYPOTHYROIDISM, UNSPECIFIED TYPE: ICD-10-CM

## 2018-05-21 DIAGNOSIS — L03.90 CELLULITIS, UNSPECIFIED CELLULITIS SITE: Primary | ICD-10-CM

## 2018-05-21 DIAGNOSIS — E78.5 HYPERLIPIDEMIA, UNSPECIFIED HYPERLIPIDEMIA TYPE: ICD-10-CM

## 2018-05-21 LAB
ALBUMIN SERPL-MCNC: 4.4 G/DL (ref 3.5–5.2)
ALBUMIN/GLOB SERPL: 2.1 G/DL
ALP SERPL-CCNC: 69 U/L (ref 39–117)
ALT SERPL-CCNC: 18 U/L (ref 1–33)
AST SERPL-CCNC: 20 U/L (ref 1–32)
BASOPHILS # BLD AUTO: 0.02 10*3/MM3 (ref 0–0.2)
BASOPHILS NFR BLD AUTO: 0.2 % (ref 0–1.5)
BILIRUB SERPL-MCNC: 0.2 MG/DL (ref 0.1–1.2)
BUN SERPL-MCNC: 19 MG/DL (ref 8–23)
BUN/CREAT SERPL: 21.1 (ref 7–25)
CALCIUM SERPL-MCNC: 9.7 MG/DL (ref 8.6–10.5)
CHLORIDE SERPL-SCNC: 103 MMOL/L (ref 98–107)
CHOLEST SERPL-MCNC: 191 MG/DL (ref 0–200)
CO2 SERPL-SCNC: 24.1 MMOL/L (ref 22–29)
CREAT SERPL-MCNC: 0.9 MG/DL (ref 0.57–1)
EOSINOPHIL # BLD AUTO: 0.08 10*3/MM3 (ref 0–0.7)
EOSINOPHIL NFR BLD AUTO: 0.9 % (ref 0.3–6.2)
ERYTHROCYTE [DISTWIDTH] IN BLOOD BY AUTOMATED COUNT: 13.5 % (ref 11.7–13)
GFR SERPLBLD CREATININE-BSD FMLA CKD-EPI: 61 ML/MIN/1.73
GFR SERPLBLD CREATININE-BSD FMLA CKD-EPI: 74 ML/MIN/1.73
GLOBULIN SER CALC-MCNC: 2.1 GM/DL
GLUCOSE SERPL-MCNC: 95 MG/DL (ref 65–99)
HCT VFR BLD AUTO: 43.6 % (ref 35.6–45.5)
HDLC SERPL-MCNC: 74 MG/DL (ref 40–60)
HGB BLD-MCNC: 14.4 G/DL (ref 11.9–15.5)
IMM GRANULOCYTES # BLD: 0.01 10*3/MM3 (ref 0–0.03)
IMM GRANULOCYTES NFR BLD: 0.1 % (ref 0–0.5)
LDLC SERPL CALC-MCNC: 90 MG/DL (ref 0–100)
LDLC/HDLC SERPL: 1.22 {RATIO}
LYMPHOCYTES # BLD AUTO: 1.45 10*3/MM3 (ref 0.9–4.8)
LYMPHOCYTES NFR BLD AUTO: 16.2 % (ref 19.6–45.3)
MCH RBC QN AUTO: 31.5 PG (ref 26.9–32)
MCHC RBC AUTO-ENTMCNC: 33 G/DL (ref 32.4–36.3)
MCV RBC AUTO: 95.4 FL (ref 80.5–98.2)
MONOCYTES # BLD AUTO: 0.46 10*3/MM3 (ref 0.2–1.2)
MONOCYTES NFR BLD AUTO: 5.1 % (ref 5–12)
NEUTROPHILS # BLD AUTO: 6.93 10*3/MM3 (ref 1.9–8.1)
NEUTROPHILS NFR BLD AUTO: 77.6 % (ref 42.7–76)
PLATELET # BLD AUTO: 195 10*3/MM3 (ref 140–500)
POTASSIUM SERPL-SCNC: 4.1 MMOL/L (ref 3.5–5.2)
PROT SERPL-MCNC: 6.5 G/DL (ref 6–8.5)
RBC # BLD AUTO: 4.57 10*6/MM3 (ref 3.9–5.2)
SODIUM SERPL-SCNC: 143 MMOL/L (ref 136–145)
TRIGL SERPL-MCNC: 135 MG/DL (ref 0–150)
VLDLC SERPL CALC-MCNC: 27 MG/DL (ref 5–40)
WBC # BLD AUTO: 8.94 10*3/MM3 (ref 4.5–10.7)

## 2018-05-21 PROCEDURE — 99214 OFFICE O/P EST MOD 30 MIN: CPT | Performed by: INTERNAL MEDICINE

## 2018-05-21 RX ORDER — SACCHAROMYCES BOULARDII 250 MG
250 CAPSULE ORAL 2 TIMES DAILY
Qty: 20 CAPSULE | Refills: 1 | Status: SHIPPED | OUTPATIENT
Start: 2018-05-21 | End: 2018-12-10

## 2018-05-21 RX ORDER — CEPHALEXIN 500 MG/1
500 CAPSULE ORAL 3 TIMES DAILY
Qty: 21 CAPSULE | Refills: 0 | Status: SHIPPED | OUTPATIENT
Start: 2018-05-21 | End: 2018-09-11

## 2018-05-21 NOTE — PROGRESS NOTES
Chief Complaint   Patient presents with   • Laceration     on R leg    • Sore Throat   leg wound, sore throat, congestion    History of Present Illness   Johana Corrigan is a 77 y.o. female presents for acute care. Right lower extremity leg wound. Struck leg on corner of her washer 3 weeks ago. She has been treating locally. Had a skin flap she has adhered. She is cleaning regularly but has just noticed increasing redness and warmth at site of injury. No fevers.   Additional c/o sore throat. Increased sinus drainge. Has seasonal allergies.     The following portions of the patient's history were reviewed and updated as appropriate: allergies, current medications, past family history, past medical history, past social history, past surgical history and problem list.  Current Outpatient Prescriptions on File Prior to Visit   Medication Sig Dispense Refill   • levothyroxine (SYNTHROID) 75 MCG tablet Take 1 tablet by mouth Daily. 90 tablet 3   • rosuvastatin (CRESTOR) 5 MG tablet Take 1 tablet by mouth Daily. 90 tablet 3   • [DISCONTINUED] nitrofurantoin, macrocrystal-monohydrate, (MACROBID) 100 MG capsule Take 1 capsule by mouth 2 (Two) Times a Day. 10 capsule 0   • [DISCONTINUED] phenazopyridine (PYRIDIUM) 200 MG tablet One tablet 3 x's a day as needed for symptoms 10 tablet 0   • [DISCONTINUED] phenazopyridine (PYRIDIUM) 200 MG tablet One tablet 3 x's a day as needed for symptoms 10 tablet 0     Current Facility-Administered Medications on File Prior to Visit   Medication Dose Route Frequency Provider Last Rate Last Dose   • cyanocobalamin injection 1,000 mcg  1,000 mcg Intramuscular Q28 Days Sola Gómez MD   1,000 mcg at 07/11/16 1305     Review of Systems   Constitutional: Negative.    HENT: Positive for postnasal drip, rhinorrhea and sore throat. Negative for sinus pain.    Eyes: Negative.    Respiratory: Negative.    Cardiovascular: Negative.    Musculoskeletal: Negative.    Skin: Positive for wound.        Objective   Physical Exam   Constitutional: She is oriented to person, place, and time. She appears well-developed and well-nourished.   HENT:   Head: Normocephalic and atraumatic.   Right Ear: Hearing, tympanic membrane and external ear normal.   Left Ear: Hearing, tympanic membrane and external ear normal.   Nose: Nose normal.   Mouth/Throat: Oropharynx is clear and moist.   Neck: Neck supple. No thyromegaly present.   Cardiovascular: Normal rate, regular rhythm and normal heart sounds.    No murmur heard.  Pulmonary/Chest: Effort normal and breath sounds normal. Right breast exhibits no mass. Left breast exhibits no mass.   Abdominal: Soft. She exhibits no distension. There is no hepatosplenomegaly. There is no tenderness.   Genitourinary: No breast tenderness.   Lymphadenopathy:     She has no cervical adenopathy.   Neurological: She is alert and oriented to person, place, and time.   Skin: Skin is warm and dry.   Left leg w/ cellulitic appearance surrounding wound. Erythema, tender, warm.   Psychiatric: She has a normal mood and affect. Her speech is normal and behavior is normal. Judgment and thought content normal. Cognition and memory are normal.        /64   Pulse 70   Temp 97.8 °F (36.6 °C)   Wt 63.5 kg (140 lb)   SpO2 97%   BMI 26.47 kg/m²     Assessment/Plan   Diagnoses and all orders for this visit:    Cellulitis, unspecified cellulitis site  -     Comprehensive Metabolic Panel  -     CBC & Differential    Leg wound, right, initial encounter    Hyperlipidemia, unspecified hyperlipidemia type  -     Comprehensive Metabolic Panel  -     Lipid Panel With LDL / HDL Ratio    Hypothyroidism, unspecified type  -     Comprehensive Metabolic Panel  -     Lipid Panel With LDL / HDL Ratio    Other orders  -     cephalexin (KEFLEX) 500 MG capsule; Take 1 capsule by mouth 3 (Three) Times a Day.  -     mupirocin (BACTROBAN) 2 % ointment; Apply  topically 3 (Three) Times a Day.      Patient with acute  cellulitis right lower extremity. She will start keflex tid. To take probiotic with this. Topical mupirocin. To wound care as well.   She has a history of hyperlipidemia. Will test lipid levels today. She will also have thyroid levels tested given that she had to reschedule her appointment to review this. She will follow up once levels available.

## 2018-05-25 ENCOUNTER — OFFICE VISIT (OUTPATIENT)
Dept: INTERNAL MEDICINE | Facility: CLINIC | Age: 78
End: 2018-05-25

## 2018-05-25 VITALS
BODY MASS INDEX: 25.71 KG/M2 | DIASTOLIC BLOOD PRESSURE: 60 MMHG | HEART RATE: 72 BPM | OXYGEN SATURATION: 98 % | WEIGHT: 136 LBS | SYSTOLIC BLOOD PRESSURE: 120 MMHG

## 2018-05-25 DIAGNOSIS — L03.90 WOUND CELLULITIS: Primary | ICD-10-CM

## 2018-05-25 DIAGNOSIS — J01.00 ACUTE NON-RECURRENT MAXILLARY SINUSITIS: ICD-10-CM

## 2018-05-25 PROCEDURE — 99213 OFFICE O/P EST LOW 20 MIN: CPT | Performed by: INTERNAL MEDICINE

## 2018-05-25 NOTE — PROGRESS NOTES
Chief Complaint   Patient presents with   • Wound Check     Right leg wound   Nasal drainage.     History of Present Illness   Johana Corrigan is a 77 y.o. female presents for follow up evaluation. She has a right leg wound w/ cellulitis. Started keflex 3 days ago for this w/ bactroban ointment. Would is now improving in appearance w/ less erythema, swelling, or tenderness. She is scheduled for wound care evaluation.   Additional c/o of nasal drainage. She is having difficulty sleeping due to sinus drainage.     The following portions of the patient's history were reviewed and updated as appropriate: allergies, current medications, past family history, past medical history, past social history, past surgical history and problem list.  Current Outpatient Prescriptions on File Prior to Visit   Medication Sig Dispense Refill   • cephalexin (KEFLEX) 500 MG capsule Take 1 capsule by mouth 3 (Three) Times a Day. 21 capsule 0   • levothyroxine (SYNTHROID) 75 MCG tablet Take 1 tablet by mouth Daily. 90 tablet 3   • mupirocin (BACTROBAN) 2 % ointment Apply  topically 3 (Three) Times a Day. 15 g 1   • rosuvastatin (CRESTOR) 5 MG tablet Take 1 tablet by mouth Daily. 90 tablet 3   • saccharomyces boulardii (FLORASTOR) 250 MG capsule Take 1 capsule by mouth 2 (Two) Times a Day. 20 capsule 1     Current Facility-Administered Medications on File Prior to Visit   Medication Dose Route Frequency Provider Last Rate Last Dose   • cyanocobalamin injection 1,000 mcg  1,000 mcg Intramuscular Q28 Days Sola Gómez MD   1,000 mcg at 07/11/16 1305     Review of Systems   Constitutional: Positive for fatigue.   HENT: Positive for postnasal drip, rhinorrhea and sinus pain. Negative for trouble swallowing.    Eyes: Negative.    Respiratory: Positive for cough.    Cardiovascular: Negative.    Gastrointestinal: Negative.    Endocrine: Negative.    Genitourinary: Negative.    Musculoskeletal: Negative.    Skin: Positive for wound.    Allergic/Immunologic: Negative.    Neurological: Negative.    Hematological: Negative.    Psychiatric/Behavioral: Negative.        Objective   Physical Exam   Constitutional: She is oriented to person, place, and time. She appears well-developed and well-nourished.   HENT:   Head: Normocephalic and atraumatic.   Right Ear: External ear normal.   Left Ear: External ear normal.   Pharyngeal erythema  Sinus bogginess   Eyes: EOM are normal. Pupils are equal, round, and reactive to light.   Neck: Normal range of motion. Neck supple.   Cardiovascular: Normal rate, regular rhythm and normal heart sounds.    Pulmonary/Chest: Effort normal and breath sounds normal.   Abdominal: Soft. Bowel sounds are normal.   Musculoskeletal: Normal range of motion.   Neurological: She is alert and oriented to person, place, and time.   Skin: There is erythema.   Right anterior tibial region with healing leg wound. Erythema lessened from Tuesday.    Psychiatric: She has a normal mood and affect. Her behavior is normal. Judgment and thought content normal.   Nursing note and vitals reviewed.       /60   Pulse 72   Wt 61.7 kg (136 lb)   SpO2 98%   BMI 25.71 kg/m²     Assessment/Plan   Diagnoses and all orders for this visit:    Wound cellulitis    Acute non-recurrent maxillary sinusitis    Other orders  -     Chlorcyclizine-Pseudoephed (STAHIST AD) 25-60 MG tablet; Take 1 tablet by mouth 2 (Two) Times a Day As Needed (sinus draingae).      Presents for follow up on leg wound. She is improving with keflex and ointment. Continue same. F/u w/ wound care as scheduled to ensure full healing. She will complete her antibiotics. Nasal steroid spray. Prn stahist for drainage. F/u prn.

## 2018-05-31 ENCOUNTER — OFFICE VISIT (OUTPATIENT)
Dept: WOUND CARE | Facility: HOSPITAL | Age: 78
End: 2018-05-31
Attending: SURGERY

## 2018-05-31 PROCEDURE — G0463 HOSPITAL OUTPT CLINIC VISIT: HCPCS

## 2018-06-01 ENCOUNTER — TELEPHONE (OUTPATIENT)
Dept: INTERNAL MEDICINE | Facility: CLINIC | Age: 78
End: 2018-06-01

## 2018-06-01 RX ORDER — METHYLPREDNISOLONE 4 MG/1
TABLET ORAL
Qty: 21 TABLET | Refills: 0 | Status: SHIPPED | OUTPATIENT
Start: 2018-06-01 | End: 2018-09-11

## 2018-06-01 NOTE — TELEPHONE ENCOUNTER
Pt is calling for some thing to take for heavy thick drainage   She is using a nasal spray and rinse.  She is asking for something OTC or RX to help with drainage

## 2018-06-13 ENCOUNTER — OFFICE VISIT (OUTPATIENT)
Dept: WOUND CARE | Facility: HOSPITAL | Age: 78
End: 2018-06-13
Attending: SURGERY

## 2018-06-20 ENCOUNTER — OFFICE VISIT (OUTPATIENT)
Dept: WOUND CARE | Facility: HOSPITAL | Age: 78
End: 2018-06-20
Attending: SURGERY

## 2018-06-27 ENCOUNTER — OFFICE VISIT (OUTPATIENT)
Dept: WOUND CARE | Facility: HOSPITAL | Age: 78
End: 2018-06-27
Attending: SURGERY

## 2018-07-11 ENCOUNTER — OFFICE VISIT (OUTPATIENT)
Dept: WOUND CARE | Facility: HOSPITAL | Age: 78
End: 2018-07-11
Attending: SURGERY

## 2018-07-25 ENCOUNTER — OFFICE VISIT (OUTPATIENT)
Dept: WOUND CARE | Facility: HOSPITAL | Age: 78
End: 2018-07-25
Attending: SURGERY

## 2018-07-25 PROCEDURE — G0463 HOSPITAL OUTPT CLINIC VISIT: HCPCS

## 2018-09-11 ENCOUNTER — TELEPHONE (OUTPATIENT)
Dept: INTERNAL MEDICINE | Facility: CLINIC | Age: 78
End: 2018-09-11

## 2018-09-11 ENCOUNTER — OFFICE VISIT (OUTPATIENT)
Dept: INTERNAL MEDICINE | Facility: CLINIC | Age: 78
End: 2018-09-11

## 2018-09-11 VITALS
BODY MASS INDEX: 25.14 KG/M2 | HEART RATE: 72 BPM | DIASTOLIC BLOOD PRESSURE: 60 MMHG | SYSTOLIC BLOOD PRESSURE: 120 MMHG | OXYGEN SATURATION: 98 % | WEIGHT: 133 LBS

## 2018-09-11 DIAGNOSIS — N39.0 RECURRENT UTI: ICD-10-CM

## 2018-09-11 DIAGNOSIS — R39.9 UTI SYMPTOMS: Primary | ICD-10-CM

## 2018-09-11 DIAGNOSIS — R31.9 HEMATURIA, UNSPECIFIED TYPE: Primary | ICD-10-CM

## 2018-09-11 LAB
BILIRUB BLD-MCNC: NEGATIVE MG/DL
CLARITY, POC: ABNORMAL
COLOR UR: ABNORMAL
GLUCOSE UR STRIP-MCNC: NEGATIVE MG/DL
KETONES UR QL: NEGATIVE
LEUKOCYTE EST, POC: ABNORMAL
NITRITE UR-MCNC: POSITIVE MG/ML
PH UR: 6 [PH] (ref 5–8)
PROT UR STRIP-MCNC: ABNORMAL MG/DL
RBC # UR STRIP: ABNORMAL /UL
SP GR UR: 1.02 (ref 1–1.03)
UROBILINOGEN UR QL: NORMAL

## 2018-09-11 PROCEDURE — G0008 ADMIN INFLUENZA VIRUS VAC: HCPCS | Performed by: INTERNAL MEDICINE

## 2018-09-11 PROCEDURE — 90662 IIV NO PRSV INCREASED AG IM: CPT | Performed by: INTERNAL MEDICINE

## 2018-09-11 PROCEDURE — 81003 URINALYSIS AUTO W/O SCOPE: CPT | Performed by: INTERNAL MEDICINE

## 2018-09-11 PROCEDURE — 99213 OFFICE O/P EST LOW 20 MIN: CPT | Performed by: INTERNAL MEDICINE

## 2018-09-11 RX ORDER — NITROFURANTOIN 25; 75 MG/1; MG/1
100 CAPSULE ORAL 2 TIMES DAILY
Qty: 14 CAPSULE | Refills: 0 | Status: SHIPPED | OUTPATIENT
Start: 2018-09-11 | End: 2018-09-18

## 2018-09-11 RX ORDER — PHENAZOPYRIDINE HYDROCHLORIDE 200 MG/1
200 TABLET, FILM COATED ORAL 3 TIMES DAILY PRN
Qty: 15 TABLET | Refills: 0 | Status: SHIPPED | OUTPATIENT
Start: 2018-09-11 | End: 2018-12-10

## 2018-09-11 NOTE — PROGRESS NOTES
Chief Complaint   Patient presents with   • Urinary Tract Infection       History of Present Illness   Johana Corrigan is a 77 y.o. female presents for acute needs. Patient with recent UTI. She went to an urgent treatment center. She had hematuria, urinary frequency, and urgency. Patient reports that the blood has cleared but frequency and urgency has persisted after bactrim ds bid x 3 days.     The following portions of the patient's history were reviewed and updated as appropriate: allergies, current medications, past family history, past medical history, past social history, past surgical history and problem list.  Current Outpatient Prescriptions on File Prior to Visit   Medication Sig Dispense Refill   • Chlorcyclizine-Pseudoephed (STAHIST AD) 25-60 MG tablet Take 1 tablet by mouth 2 (Two) Times a Day As Needed (sinus draingae). 20 tablet 0   • levothyroxine (SYNTHROID) 75 MCG tablet Take 1 tablet by mouth Daily. 90 tablet 3   • mupirocin (BACTROBAN) 2 % ointment Apply  topically 3 (Three) Times a Day. 15 g 1   • rosuvastatin (CRESTOR) 5 MG tablet Take 1 tablet by mouth Daily. 90 tablet 3   • saccharomyces boulardii (FLORASTOR) 250 MG capsule Take 1 capsule by mouth 2 (Two) Times a Day. 20 capsule 1   • Wound Dressings (MEDIHONEY WOUND/BURN DRESSING) gel Apply as directed 44 mL 0   • [DISCONTINUED] cephalexin (KEFLEX) 500 MG capsule Take 1 capsule by mouth 3 (Three) Times a Day. 21 capsule 0   • [DISCONTINUED] MethylPREDNISolone (MEDROL, BOGDAN,) 4 MG tablet Take as directed on package instructions. 21 tablet 0     Current Facility-Administered Medications on File Prior to Visit   Medication Dose Route Frequency Provider Last Rate Last Dose   • cyanocobalamin injection 1,000 mcg  1,000 mcg Intramuscular Q28 Days Sola Gómez MD   1,000 mcg at 07/11/16 1305     Review of Systems   Constitutional: Negative.    HENT: Negative.    Eyes: Negative.    Respiratory: Negative.    Cardiovascular: Negative.     Gastrointestinal: Negative.    Endocrine: Negative.    Genitourinary: Positive for dysuria, frequency and hematuria.   Musculoskeletal: Negative.    Skin: Negative.    Allergic/Immunologic: Negative.    Neurological: Negative.    Hematological: Negative.    Psychiatric/Behavioral: Negative.        Objective   Physical Exam   Constitutional: She is oriented to person, place, and time. She appears well-developed and well-nourished.   HENT:   Head: Normocephalic and atraumatic.   Right Ear: External ear normal.   Left Ear: External ear normal.   Mouth/Throat: Oropharynx is clear and moist.   Eyes: Pupils are equal, round, and reactive to light. EOM are normal.   Neck: Normal range of motion. Neck supple.   Cardiovascular: Normal rate, regular rhythm, normal heart sounds and intact distal pulses.    Pulmonary/Chest: Effort normal and breath sounds normal.   Abdominal: Soft. Bowel sounds are normal.   Musculoskeletal: Normal range of motion.   Neurological: She is alert and oriented to person, place, and time.   Skin: Skin is warm and dry.   bruising   Psychiatric: She has a normal mood and affect. Her behavior is normal. Judgment and thought content normal.   Nursing note and vitals reviewed.       /60   Pulse 72   Wt 60.3 kg (133 lb)   SpO2 98%   BMI 25.14 kg/m²     Assessment/Plan   Diagnoses and all orders for this visit:    UTI symptoms  -     POC Urinalysis Dipstick, Automated  -     Urine Culture - Urine, Urine, Clean Catch      Patient w/ acute uti. Continued symptoms. Will give pyridium as needed. Last sensitivities 4/18 pan sensitive. She will take macrobid bid x 7 d to complete her course. She will follow up if symptoms persist or worsen. Advised using vaginal lubricant. uti precautions given.     Given hematuria will refer for ct abd/ pelvis.

## 2018-09-11 NOTE — TELEPHONE ENCOUNTER
----- Message from Sola Gómez MD sent at 9/11/2018 12:12 PM EDT -----  Advise patient that I would like for her to have a ct scan as she has had blood in the urine and infection 3 times in 1 year. Order is in chart.           Pt informed

## 2018-09-15 LAB
BACTERIA UR CULT: ABNORMAL
BACTERIA UR CULT: ABNORMAL
OTHER ANTIBIOTIC SUSC ISLT: ABNORMAL

## 2018-09-27 ENCOUNTER — HOSPITAL ENCOUNTER (OUTPATIENT)
Dept: CT IMAGING | Facility: HOSPITAL | Age: 78
Discharge: HOME | End: 2018-09-27
Admitting: INTERNAL MEDICINE

## 2018-09-27 DIAGNOSIS — R31.9 HEMATURIA, UNSPECIFIED TYPE: ICD-10-CM

## 2018-09-27 DIAGNOSIS — N39.0 RECURRENT UTI: ICD-10-CM

## 2018-09-27 PROCEDURE — 74176 CT ABD & PELVIS W/O CONTRAST: CPT

## 2018-09-28 ENCOUNTER — DOCUMENTATION (OUTPATIENT)
Dept: INTERNAL MEDICINE | Facility: CLINIC | Age: 78
End: 2018-09-28

## 2018-09-28 DIAGNOSIS — J18.9 COMMUNITY ACQUIRED PNEUMONIA OF LEFT LOWER LOBE OF LUNG: Primary | ICD-10-CM

## 2018-09-28 RX ORDER — LEVOFLOXACIN 500 MG/1
500 TABLET, FILM COATED ORAL DAILY
Qty: 7 TABLET | Refills: 0 | Status: SHIPPED | OUTPATIENT
Start: 2018-09-28 | End: 2018-11-12

## 2018-10-01 ENCOUNTER — TRANSCRIBE ORDERS (OUTPATIENT)
Dept: ADMINISTRATIVE | Facility: HOSPITAL | Age: 78
End: 2018-10-01

## 2018-10-01 DIAGNOSIS — Z12.31 VISIT FOR SCREENING MAMMOGRAM: Primary | ICD-10-CM

## 2018-11-12 ENCOUNTER — HOSPITAL ENCOUNTER (OUTPATIENT)
Dept: MAMMOGRAPHY | Facility: HOSPITAL | Age: 78
Discharge: HOME | End: 2018-11-12

## 2018-11-12 ENCOUNTER — DOCUMENTATION (OUTPATIENT)
Dept: INTERNAL MEDICINE | Facility: CLINIC | Age: 78
End: 2018-11-12

## 2018-11-12 ENCOUNTER — HOSPITAL ENCOUNTER (OUTPATIENT)
Dept: CT IMAGING | Facility: HOSPITAL | Age: 78
Discharge: HOME | End: 2018-11-12
Admitting: INTERNAL MEDICINE

## 2018-11-12 DIAGNOSIS — R91.8 INFILTRATE OF LUNG PRESENT ON IMAGING OF CHEST: Primary | ICD-10-CM

## 2018-11-12 DIAGNOSIS — Z12.31 VISIT FOR SCREENING MAMMOGRAM: ICD-10-CM

## 2018-11-12 DIAGNOSIS — J18.9 COMMUNITY ACQUIRED PNEUMONIA OF LEFT LOWER LOBE OF LUNG: ICD-10-CM

## 2018-11-12 PROCEDURE — 77063 BREAST TOMOSYNTHESIS BI: CPT

## 2018-11-12 PROCEDURE — 77067 SCR MAMMO BI INCL CAD: CPT

## 2018-11-12 PROCEDURE — 71250 CT THORAX DX C-: CPT

## 2018-11-12 RX ORDER — CIPROFLOXACIN 500 MG/1
500 TABLET, FILM COATED ORAL 2 TIMES DAILY
Qty: 14 TABLET | Refills: 0 | Status: SHIPPED | OUTPATIENT
Start: 2018-11-12 | End: 2018-11-19

## 2018-11-12 NOTE — PROGRESS NOTES
Patient found to have a persistent infiltrate on CT chest. She is not having pulmonary symptoms at this time. She completed levaquin approx 6 weeks prior. Patient will be referred to pulmonary for further evaluation. Patient also reports that she will be engaging in a fair amount of car travel over the next several weeks and has had uti in association with this in the past. A rx for cipro to be sent to her pharmacy in the event that she does become symptomatic. She will f/u routinely.  ENRIQUE

## 2018-12-02 DIAGNOSIS — E78.5 HYPERLIPIDEMIA, UNSPECIFIED HYPERLIPIDEMIA TYPE: Primary | ICD-10-CM

## 2018-12-02 DIAGNOSIS — E53.8 B12 DEFICIENCY: ICD-10-CM

## 2018-12-02 DIAGNOSIS — E03.9 HYPOTHYROIDISM, UNSPECIFIED TYPE: ICD-10-CM

## 2018-12-03 LAB
ALBUMIN SERPL-MCNC: 4.2 G/DL (ref 3.5–5.2)
ALBUMIN/GLOB SERPL: 2.1 G/DL
ALP SERPL-CCNC: 65 U/L (ref 39–117)
ALT SERPL-CCNC: 16 U/L (ref 1–33)
AST SERPL-CCNC: 19 U/L (ref 1–32)
BILIRUB SERPL-MCNC: 0.3 MG/DL (ref 0.1–1.2)
BUN SERPL-MCNC: 19 MG/DL (ref 8–23)
BUN/CREAT SERPL: 25 (ref 7–25)
CALCIUM SERPL-MCNC: 9.4 MG/DL (ref 8.6–10.5)
CHLORIDE SERPL-SCNC: 102 MMOL/L (ref 98–107)
CHOLEST SERPL-MCNC: 252 MG/DL (ref 0–200)
CO2 SERPL-SCNC: 25.9 MMOL/L (ref 22–29)
CREAT SERPL-MCNC: 0.76 MG/DL (ref 0.57–1)
GLOBULIN SER CALC-MCNC: 2 GM/DL
GLUCOSE SERPL-MCNC: 91 MG/DL (ref 65–99)
HDLC SERPL-MCNC: 79 MG/DL (ref 40–60)
LDLC SERPL CALC-MCNC: 157 MG/DL (ref 0–100)
LDLC/HDLC SERPL: 1.99 {RATIO}
POTASSIUM SERPL-SCNC: 4.1 MMOL/L (ref 3.5–5.2)
PROT SERPL-MCNC: 6.2 G/DL (ref 6–8.5)
SODIUM SERPL-SCNC: 140 MMOL/L (ref 136–145)
TRIGL SERPL-MCNC: 79 MG/DL (ref 0–150)
TSH SERPL DL<=0.005 MIU/L-ACNC: 1.75 MIU/ML (ref 0.27–4.2)
VIT B12 SERPL-MCNC: 501 PG/ML (ref 211–946)
VLDLC SERPL CALC-MCNC: 15.8 MG/DL (ref 5–40)

## 2018-12-03 NOTE — PROGRESS NOTES
Your laboratory results are NORMAL with the exception of cholesterol which is elevated. We will discuss these results in detail at your next office visit. Please call with any questions or concerns.  Sincerely,  Sola Gómez MD

## 2018-12-10 ENCOUNTER — OFFICE VISIT (OUTPATIENT)
Dept: INTERNAL MEDICINE | Facility: CLINIC | Age: 78
End: 2018-12-10

## 2018-12-10 VITALS
BODY MASS INDEX: 25.91 KG/M2 | OXYGEN SATURATION: 98 % | HEIGHT: 60 IN | DIASTOLIC BLOOD PRESSURE: 64 MMHG | HEART RATE: 73 BPM | SYSTOLIC BLOOD PRESSURE: 118 MMHG | WEIGHT: 132 LBS

## 2018-12-10 DIAGNOSIS — R06.00 DYSPNEA, UNSPECIFIED TYPE: ICD-10-CM

## 2018-12-10 DIAGNOSIS — E03.9 HYPOTHYROIDISM, UNSPECIFIED TYPE: ICD-10-CM

## 2018-12-10 DIAGNOSIS — E78.5 HYPERLIPIDEMIA, UNSPECIFIED HYPERLIPIDEMIA TYPE: ICD-10-CM

## 2018-12-10 DIAGNOSIS — Z00.00 HEALTHCARE MAINTENANCE: Primary | ICD-10-CM

## 2018-12-10 DIAGNOSIS — I49.1 PAC (PREMATURE ATRIAL CONTRACTION): ICD-10-CM

## 2018-12-10 DIAGNOSIS — R91.8 LUNG INFILTRATE ON CT: ICD-10-CM

## 2018-12-10 PROCEDURE — 90632 HEPA VACCINE ADULT IM: CPT | Performed by: INTERNAL MEDICINE

## 2018-12-10 PROCEDURE — 99213 OFFICE O/P EST LOW 20 MIN: CPT | Performed by: INTERNAL MEDICINE

## 2018-12-10 PROCEDURE — 90471 IMMUNIZATION ADMIN: CPT | Performed by: INTERNAL MEDICINE

## 2018-12-10 PROCEDURE — G0439 PPPS, SUBSEQ VISIT: HCPCS | Performed by: INTERNAL MEDICINE

## 2018-12-10 PROCEDURE — 93000 ELECTROCARDIOGRAM COMPLETE: CPT | Performed by: INTERNAL MEDICINE

## 2018-12-10 NOTE — PROGRESS NOTES
"Subjective   AWV  Abnormal CT chest  Dyspnea  Hyperlipidemia  Hypothyroidism    Johana Corrigan is a 78 y.o. female who presents for an annual wellness visit, review of chronic issues, and to discuss new concerns. Patient recently w/ new onset dyspnea. She is noting waking at night with a sensation of dyspnea \"like a hard time breathing or panting\". No racing heart, no leg swelling. She had a ct abd pelvis for recurrent uti. This did note a lll infiltrate. She was started on levaquin. Had ct chest with evidence of left lung base reticulonodular consolidation. She was referred to pulmonology and may have repeat imaging. She is scheduled to see Dr. Kenney today for further review.   In terms of her recurrent UTI she is currently asymptomatic w/ normal urine activity.   Patient has a history of hyperlipidemia. She did d/c crestor and lipids did increase. She is on a low fat diet. She did restart crestor once results were available.   She is euthyroid on synthroid replacement therapy.       Review of Systems   Constitutional: Negative.    HENT: Negative.    Eyes: Negative.    Respiratory: Positive for shortness of breath.         Less evening dyspnea this week. More notable when under increased stress.    Cardiovascular: Negative.    Gastrointestinal: Negative.    Endocrine: Negative.    Genitourinary: Negative.    Musculoskeletal: Negative.    Skin: Negative.    Allergic/Immunologic: Negative.    Neurological: Negative.    Hematological: Negative.    Psychiatric/Behavioral: Negative.        The following portions of the patient's history were reviewed and updated as appropriate: allergies, current medications, past family history, past medical history, past social history, past surgical history and problem list.     Patient Active Problem List   Diagnosis   • Atopic rhinitis   • Osteoarthritis   • Hyperlipidemia   • Hypothyroidism   • Leukopenia   • Depressed mood       Past Medical History:   Diagnosis Date   • Allergic  "   • Arthritis    • Cataract    • Headache    • Hyperthyroidism        Past Surgical History:   Procedure Laterality Date   • NOSE SURGERY     • SUBMANDIBULAR GLAND EXCISION     • TONSILLECTOMY     • TUBAL ABDOMINAL LIGATION         Family History   Problem Relation Age of Onset   • Heart disease Father    • Heart disease Brother    • Heart disease Mother        Social History     Socioeconomic History   • Marital status:      Spouse name: Not on file   • Number of children: Not on file   • Years of education: Not on file   • Highest education level: Not on file   Social Needs   • Financial resource strain: Not on file   • Food insecurity - worry: Not on file   • Food insecurity - inability: Not on file   • Transportation needs - medical: Not on file   • Transportation needs - non-medical: Not on file   Occupational History   • Not on file   Tobacco Use   • Smoking status: Former Smoker   • Smokeless tobacco: Never Used   Substance and Sexual Activity   • Alcohol use: Yes   • Drug use: No   • Sexual activity: Not on file   Other Topics Concern   • Not on file   Social History Narrative   • Not on file       Current Outpatient Medications on File Prior to Visit   Medication Sig Dispense Refill   • Chlorcyclizine-Pseudoephed (STAHIST AD) 25-60 MG tablet Take 1 tablet by mouth 2 (Two) Times a Day As Needed (sinus draingae). 20 tablet 0   • levothyroxine (SYNTHROID) 75 MCG tablet Take 1 tablet by mouth Daily. 90 tablet 3   • mupirocin (BACTROBAN) 2 % ointment Apply  topically 3 (Three) Times a Day. 15 g 1   • rosuvastatin (CRESTOR) 5 MG tablet Take 1 tablet by mouth Daily. 90 tablet 3   • [DISCONTINUED] saccharomyces boulardii (FLORASTOR) 250 MG capsule Take 1 capsule by mouth 2 (Two) Times a Day. 20 capsule 1   • [DISCONTINUED] phenazopyridine (PYRIDIUM) 200 MG tablet Take 1 tablet by mouth 3 (Three) Times a Day As Needed for bladder spasms. 15 tablet 0   • [DISCONTINUED] Wound Dressings (MEDIHONEY WOUND/BURN  "DRESSING) gel Apply as directed 44 mL 0     Current Facility-Administered Medications on File Prior to Visit   Medication Dose Route Frequency Provider Last Rate Last Dose   • cyanocobalamin injection 1,000 mcg  1,000 mcg Intramuscular Q28 Days Sola Gómez MD   1,000 mcg at 07/11/16 1305       No Known Allergies    Immunization History   Administered Date(s) Administered   • Flu Vaccine High Dose PF 65YR+ 10/04/2016, 10/04/2017, 09/11/2018   • Pneumococcal Conjugate 13-Valent (PCV13) 10/04/2016   • Pneumococcal Polysaccharide (PPSV23) 02/01/2010   • Tdap 10/29/2013   • Zostavax 02/01/2010       Objective     /64   Pulse 73   Ht 151.8 cm (59.75\")   Wt 59.9 kg (132 lb)   SpO2 98%   BMI 26.00 kg/m²     Physical Exam   Constitutional: She is oriented to person, place, and time. She appears well-developed and well-nourished.   HENT:   Head: Normocephalic and atraumatic.   Right Ear: External ear normal.   Left Ear: External ear normal.   Nose: Nose normal.   Mouth/Throat: Oropharynx is clear and moist.   Eyes: Conjunctivae and EOM are normal. Pupils are equal, round, and reactive to light.   Neck: Normal range of motion. Neck supple.   Cardiovascular: Normal rate, regular rhythm, normal heart sounds and intact distal pulses.   Pulmonary/Chest: Effort normal and breath sounds normal. No respiratory distress. Right breast exhibits no inverted nipple, no mass, no nipple discharge, no skin change and no tenderness. Left breast exhibits no inverted nipple, no mass, no nipple discharge, no skin change and no tenderness. Breasts are symmetrical. There is no breast swelling.   Abdominal: Soft. Bowel sounds are normal. Hernia confirmed negative in the right inguinal area and confirmed negative in the left inguinal area.   Genitourinary: No breast tenderness, discharge or bleeding. No labial fusion. There is no rash, tenderness, lesion or injury on the right labia. There is no rash, tenderness, lesion or injury on " the left labia.   Musculoskeletal: Normal range of motion.   Lymphadenopathy: No inguinal adenopathy noted on the right or left side.   Neurological: She is alert and oriented to person, place, and time.   Skin: Skin is warm and dry.   Psychiatric: She has a normal mood and affect. Her behavior is normal. Judgment and thought content normal.   Nursing note and vitals reviewed.    EKG for hypothyroidism and hyperlipidemia. Sinus. Bigeminal PAC. Prior stress test 2015 wnl.     Assessment/Plan   Johana was seen today for annual exam.    Diagnoses and all orders for this visit:    Healthcare maintenance    Hyperlipidemia, unspecified hyperlipidemia type  -     ECG 12 Lead    Hypothyroidism, unspecified type    Lung infiltrate on CT        Discussion    AWV.     Direct observation of cognitive ability was evaluated and is normal. She is not struggling with anxiety or depression at this time. Patient was given health advice or handouts on the following:  nutrition, fall prevention, exercise    Patient w/ hyperlipidemia. She is now back on crestor and will repeat levels prior to next visit. Will continue synthroid for thyroid regulation. Suspect evening dyspnea at least in part due to anxiety. Now that she has changed obligations this has shown some improvement. She will follow up with pulmonary given infiltrate in lungs. She will get an echo as she has frequent pac and evening dyspnea. She will get vascular screening and follow up in 6 mo or prn. She will have hep A #1 today and repeat at next visit. She is current on other vaccinations.            No future appointments.

## 2018-12-11 RX ORDER — LEVOTHYROXINE SODIUM 0.07 MG/1
75 TABLET ORAL DAILY
Qty: 90 TABLET | Refills: 3 | Status: SHIPPED | OUTPATIENT
Start: 2018-12-11 | End: 2019-07-02 | Stop reason: SDUPTHER

## 2018-12-12 ENCOUNTER — HOSPITAL ENCOUNTER (OUTPATIENT)
Dept: CARDIOLOGY | Facility: HOSPITAL | Age: 78
Discharge: HOME | End: 2018-12-12
Admitting: INTERNAL MEDICINE

## 2018-12-12 ENCOUNTER — TELEPHONE (OUTPATIENT)
Dept: INTERNAL MEDICINE | Facility: CLINIC | Age: 78
End: 2018-12-12

## 2018-12-12 VITALS
DIASTOLIC BLOOD PRESSURE: 58 MMHG | HEART RATE: 73 BPM | HEIGHT: 60 IN | WEIGHT: 132.06 LBS | BODY MASS INDEX: 25.93 KG/M2 | SYSTOLIC BLOOD PRESSURE: 122 MMHG

## 2018-12-12 DIAGNOSIS — I49.1 PAC (PREMATURE ATRIAL CONTRACTION): ICD-10-CM

## 2018-12-12 DIAGNOSIS — I51.89 ATRIAL MASS: Primary | ICD-10-CM

## 2018-12-12 DIAGNOSIS — R06.00 DYSPNEA, UNSPECIFIED TYPE: ICD-10-CM

## 2018-12-12 LAB
BH CV ECHO MEAS - ACS: 2.1 CM
BH CV ECHO MEAS - AO MAX PG (FULL): 1.9 MMHG
BH CV ECHO MEAS - AO MAX PG: 5 MMHG
BH CV ECHO MEAS - AO MEAN PG (FULL): 1.4 MMHG
BH CV ECHO MEAS - AO MEAN PG: 3.2 MMHG
BH CV ECHO MEAS - AO ROOT AREA (BSA CORRECTED): 2.1
BH CV ECHO MEAS - AO ROOT AREA: 8.3 CM^2
BH CV ECHO MEAS - AO ROOT DIAM: 3.2 CM
BH CV ECHO MEAS - AO V2 MAX: 111.3 CM/SEC
BH CV ECHO MEAS - AO V2 MEAN: 83.9 CM/SEC
BH CV ECHO MEAS - AO V2 VTI: 29 CM
BH CV ECHO MEAS - AVA(I,A): 2.2 CM^2
BH CV ECHO MEAS - AVA(I,D): 2.2 CM^2
BH CV ECHO MEAS - AVA(V,A): 2.3 CM^2
BH CV ECHO MEAS - AVA(V,D): 2.3 CM^2
BH CV ECHO MEAS - BSA(HAYCOCK): 1.6 M^2
BH CV ECHO MEAS - BSA: 1.5 M^2
BH CV ECHO MEAS - BZI_BMI: 26.7 KILOGRAMS/M^2
BH CV ECHO MEAS - BZI_METRIC_HEIGHT: 149.9 CM
BH CV ECHO MEAS - BZI_METRIC_WEIGHT: 59.9 KG
BH CV ECHO MEAS - EDV(MOD-SP2): 51 ML
BH CV ECHO MEAS - EDV(MOD-SP4): 48 ML
BH CV ECHO MEAS - EDV(TEICH): 86.1 ML
BH CV ECHO MEAS - EF(CUBED): 78.6 %
BH CV ECHO MEAS - EF(MOD-BP): 63 %
BH CV ECHO MEAS - EF(MOD-SP2): 62.7 %
BH CV ECHO MEAS - EF(MOD-SP4): 64.6 %
BH CV ECHO MEAS - EF(TEICH): 71.1 %
BH CV ECHO MEAS - ESV(MOD-SP2): 19 ML
BH CV ECHO MEAS - ESV(MOD-SP4): 17 ML
BH CV ECHO MEAS - ESV(TEICH): 24.9 ML
BH CV ECHO MEAS - IVS/LVPW: 0.88
BH CV ECHO MEAS - IVSD: 0.79 CM
BH CV ECHO MEAS - LAT PEAK E' VEL: 10 CM/SEC
BH CV ECHO MEAS - LV DIASTOLIC VOL/BSA (35-75): 31.1 ML/M^2
BH CV ECHO MEAS - LV MASS(C)D: 115.5 GRAMS
BH CV ECHO MEAS - LV MASS(C)DI: 74.7 GRAMS/M^2
BH CV ECHO MEAS - LV MAX PG: 3 MMHG
BH CV ECHO MEAS - LV MEAN PG: 1.8 MMHG
BH CV ECHO MEAS - LV SYSTOLIC VOL/BSA (12-30): 11 ML/M^2
BH CV ECHO MEAS - LV V1 MAX: 87.3 CM/SEC
BH CV ECHO MEAS - LV V1 MEAN: 61.6 CM/SEC
BH CV ECHO MEAS - LV V1 VTI: 21.5 CM
BH CV ECHO MEAS - LVIDD: 4.4 CM
BH CV ECHO MEAS - LVIDS: 2.6 CM
BH CV ECHO MEAS - LVLD AP2: 6.1 CM
BH CV ECHO MEAS - LVLD AP4: 5.9 CM
BH CV ECHO MEAS - LVLS AP2: 5.1 CM
BH CV ECHO MEAS - LVLS AP4: 5.1 CM
BH CV ECHO MEAS - LVOT AREA (M): 2.8 CM^2
BH CV ECHO MEAS - LVOT AREA: 2.9 CM^2
BH CV ECHO MEAS - LVOT DIAM: 1.9 CM
BH CV ECHO MEAS - LVPWD: 0.89 CM
BH CV ECHO MEAS - MED PEAK E' VEL: 8 CM/SEC
BH CV ECHO MEAS - MV A DUR: 0.13 SEC
BH CV ECHO MEAS - MV A MAX VEL: 94.6 CM/SEC
BH CV ECHO MEAS - MV DEC SLOPE: 540.9 CM/SEC^2
BH CV ECHO MEAS - MV DEC TIME: 0.19 SEC
BH CV ECHO MEAS - MV E MAX VEL: 106 CM/SEC
BH CV ECHO MEAS - MV E/A: 1.1
BH CV ECHO MEAS - MV MAX PG: 6 MMHG
BH CV ECHO MEAS - MV MEAN PG: 2.7 MMHG
BH CV ECHO MEAS - MV P1/2T MAX VEL: 107.2 CM/SEC
BH CV ECHO MEAS - MV P1/2T: 58 MSEC
BH CV ECHO MEAS - MV V2 MAX: 122.2 CM/SEC
BH CV ECHO MEAS - MV V2 MEAN: 77.8 CM/SEC
BH CV ECHO MEAS - MV V2 VTI: 34.5 CM
BH CV ECHO MEAS - MVA P1/2T LCG: 2.1 CM^2
BH CV ECHO MEAS - MVA(P1/2T): 3.8 CM^2
BH CV ECHO MEAS - MVA(VTI): 1.8 CM^2
BH CV ECHO MEAS - PA MAX PG (FULL): 2.3 MMHG
BH CV ECHO MEAS - PA MAX PG: 3.4 MMHG
BH CV ECHO MEAS - PA V2 MAX: 92.3 CM/SEC
BH CV ECHO MEAS - PULM A REVS DUR: 0.12 SEC
BH CV ECHO MEAS - PULM A REVS VEL: 34.5 CM/SEC
BH CV ECHO MEAS - PULM DIAS VEL: 61.8 CM/SEC
BH CV ECHO MEAS - PULM S/D: 0.89
BH CV ECHO MEAS - PULM SYS VEL: 55.2 CM/SEC
BH CV ECHO MEAS - RAP SYSTOLE: 3 MMHG
BH CV ECHO MEAS - RV MAX PG: 1.1 MMHG
BH CV ECHO MEAS - RV MEAN PG: 0.69 MMHG
BH CV ECHO MEAS - RV V1 MAX: 52.9 CM/SEC
BH CV ECHO MEAS - RV V1 MEAN: 38.3 CM/SEC
BH CV ECHO MEAS - RV V1 VTI: 13.3 CM
BH CV ECHO MEAS - RVSP: 24 MMHG
BH CV ECHO MEAS - SI(AO): 154.6 ML/M^2
BH CV ECHO MEAS - SI(CUBED): 42.3 ML/M^2
BH CV ECHO MEAS - SI(LVOT): 40.7 ML/M^2
BH CV ECHO MEAS - SI(MOD-SP2): 20.7 ML/M^2
BH CV ECHO MEAS - SI(MOD-SP4): 20.1 ML/M^2
BH CV ECHO MEAS - SI(TEICH): 39.6 ML/M^2
BH CV ECHO MEAS - SUP REN AO DIAM: 1.3 CM
BH CV ECHO MEAS - SV(AO): 239 ML
BH CV ECHO MEAS - SV(CUBED): 65.4 ML
BH CV ECHO MEAS - SV(LVOT): 62.9 ML
BH CV ECHO MEAS - SV(MOD-SP2): 32 ML
BH CV ECHO MEAS - SV(MOD-SP4): 31 ML
BH CV ECHO MEAS - SV(TEICH): 61.2 ML
BH CV ECHO MEAS - TAPSE (>1.6): 1.9 CM2
BH CV ECHO MEAS - TR MAX VEL: 228 CM/SEC
BH CV ECHO MEASUREMENTS AVERAGE E/E' RATIO: 11.78
BH CV XLRA - RV BASE: 2.7 CM
BH CV XLRA - TDI S': 11 CM/SEC
LEFT ATRIUM VOLUME INDEX: 23 ML/M2

## 2018-12-12 PROCEDURE — 93306 TTE W/DOPPLER COMPLETE: CPT

## 2018-12-12 PROCEDURE — 93306 TTE W/DOPPLER COMPLETE: CPT | Performed by: INTERNAL MEDICINE

## 2018-12-12 NOTE — TELEPHONE ENCOUNTER
Dr. Callahan called to report possible mass right atrium.  She likely needs LASHAE.  D/w patient.   She is having bronchoscopy tomorrow.  Friday would be OK for OV but then leaving for three weeks.  Will place referral to be seen Friday.      Kenyatta vang you help with Friday appointment. (not available at 2:30.  Has another appointment)

## 2018-12-13 ENCOUNTER — APPOINTMENT (OUTPATIENT)
Dept: GENERAL RADIOLOGY | Facility: HOSPITAL | Age: 78
End: 2018-12-13

## 2018-12-13 ENCOUNTER — ANESTHESIA EVENT (OUTPATIENT)
Dept: GASTROENTEROLOGY | Facility: HOSPITAL | Age: 78
End: 2018-12-13

## 2018-12-13 ENCOUNTER — ANESTHESIA (OUTPATIENT)
Dept: GASTROENTEROLOGY | Facility: HOSPITAL | Age: 78
End: 2018-12-13

## 2018-12-13 ENCOUNTER — HOSPITAL ENCOUNTER (OUTPATIENT)
Facility: HOSPITAL | Age: 78
Setting detail: HOSPITAL OUTPATIENT SURGERY
Discharge: HOME | End: 2018-12-13
Attending: INTERNAL MEDICINE | Admitting: INTERNAL MEDICINE

## 2018-12-13 VITALS
HEART RATE: 76 BPM | RESPIRATION RATE: 14 BRPM | OXYGEN SATURATION: 96 % | DIASTOLIC BLOOD PRESSURE: 82 MMHG | TEMPERATURE: 98.2 F | SYSTOLIC BLOOD PRESSURE: 126 MMHG | HEIGHT: 60 IN | BODY MASS INDEX: 26.19 KG/M2 | WEIGHT: 133.4 LBS

## 2018-12-13 DIAGNOSIS — R91.8 PULMONARY INFILTRATES: ICD-10-CM

## 2018-12-13 LAB
APPEARANCE FLD: ABNORMAL
COLOR FLD: ABNORMAL
GIE STN SPEC: NORMAL
LYMPHOCYTES NFR FLD MANUAL: 11 %
MONOCYTES NFR FLD: 1 %
MONOS+MACROS NFR FLD: 31 %
NEUTROPHILS NFR FLD MANUAL: 57 %
NIGHT BLUE STAIN TISS: NORMAL
OTHER CELLS FLUID PER 100/WBCS: 54 /100 WBCS
RBC # FLD AUTO: 1555 /MM3
WBC # FLD: 76 /MM3

## 2018-12-13 PROCEDURE — 25010000002 PROPOFOL 10 MG/ML EMULSION: Performed by: ANESTHESIOLOGY

## 2018-12-13 PROCEDURE — 88305 TISSUE EXAM BY PATHOLOGIST: CPT | Performed by: INTERNAL MEDICINE

## 2018-12-13 PROCEDURE — 87102 FUNGUS ISOLATION CULTURE: CPT | Performed by: INTERNAL MEDICINE

## 2018-12-13 PROCEDURE — 87205 SMEAR GRAM STAIN: CPT | Performed by: INTERNAL MEDICINE

## 2018-12-13 PROCEDURE — 87071 CULTURE AEROBIC QUANT OTHER: CPT | Performed by: INTERNAL MEDICINE

## 2018-12-13 PROCEDURE — 76000 FLUOROSCOPY <1 HR PHYS/QHP: CPT

## 2018-12-13 PROCEDURE — 87070 CULTURE OTHR SPECIMN AEROBIC: CPT | Performed by: INTERNAL MEDICINE

## 2018-12-13 PROCEDURE — 89051 BODY FLUID CELL COUNT: CPT | Performed by: INTERNAL MEDICINE

## 2018-12-13 PROCEDURE — 88112 CYTOPATH CELL ENHANCE TECH: CPT | Performed by: INTERNAL MEDICINE

## 2018-12-13 PROCEDURE — 87116 MYCOBACTERIA CULTURE: CPT | Performed by: INTERNAL MEDICINE

## 2018-12-13 PROCEDURE — 71045 X-RAY EXAM CHEST 1 VIEW: CPT

## 2018-12-13 PROCEDURE — 87206 SMEAR FLUORESCENT/ACID STAI: CPT | Performed by: INTERNAL MEDICINE

## 2018-12-13 RX ORDER — PROPOFOL 10 MG/ML
VIAL (ML) INTRAVENOUS AS NEEDED
Status: DISCONTINUED | OUTPATIENT
Start: 2018-12-13 | End: 2018-12-13 | Stop reason: SURG

## 2018-12-13 RX ORDER — SODIUM CHLORIDE 0.9 % (FLUSH) 0.9 %
3 SYRINGE (ML) INJECTION EVERY 12 HOURS SCHEDULED
Status: DISCONTINUED | OUTPATIENT
Start: 2018-12-13 | End: 2018-12-13 | Stop reason: HOSPADM

## 2018-12-13 RX ORDER — SODIUM CHLORIDE 0.9 % (FLUSH) 0.9 %
3-10 SYRINGE (ML) INJECTION AS NEEDED
Status: DISCONTINUED | OUTPATIENT
Start: 2018-12-13 | End: 2018-12-13 | Stop reason: HOSPADM

## 2018-12-13 RX ORDER — LIDOCAINE HYDROCHLORIDE 20 MG/ML
INJECTION, SOLUTION EPIDURAL; INFILTRATION; INTRACAUDAL; PERINEURAL AS NEEDED
Status: DISCONTINUED | OUTPATIENT
Start: 2018-12-13 | End: 2018-12-13 | Stop reason: HOSPADM

## 2018-12-13 RX ORDER — SODIUM CHLORIDE, SODIUM LACTATE, POTASSIUM CHLORIDE, CALCIUM CHLORIDE 600; 310; 30; 20 MG/100ML; MG/100ML; MG/100ML; MG/100ML
125 INJECTION, SOLUTION INTRAVENOUS CONTINUOUS
Status: DISCONTINUED | OUTPATIENT
Start: 2018-12-13 | End: 2018-12-13 | Stop reason: HOSPADM

## 2018-12-13 RX ORDER — LIDOCAINE HYDROCHLORIDE 20 MG/ML
INJECTION, SOLUTION INFILTRATION; PERINEURAL AS NEEDED
Status: DISCONTINUED | OUTPATIENT
Start: 2018-12-13 | End: 2018-12-13 | Stop reason: SURG

## 2018-12-13 RX ORDER — LIDOCAINE HYDROCHLORIDE 10 MG/ML
INJECTION, SOLUTION EPIDURAL; INFILTRATION; INTRACAUDAL; PERINEURAL AS NEEDED
Status: DISCONTINUED | OUTPATIENT
Start: 2018-12-13 | End: 2018-12-13 | Stop reason: HOSPADM

## 2018-12-13 RX ADMIN — PROPOFOL 100 MG: 10 INJECTION, EMULSION INTRAVENOUS at 07:20

## 2018-12-13 RX ADMIN — PROPOFOL 140 MG: 10 INJECTION, EMULSION INTRAVENOUS at 07:13

## 2018-12-13 RX ADMIN — PROPOFOL 100 MG: 10 INJECTION, EMULSION INTRAVENOUS at 07:15

## 2018-12-13 RX ADMIN — LIDOCAINE HYDROCHLORIDE 50 MG: 20 INJECTION, SOLUTION INFILTRATION; PERINEURAL at 07:13

## 2018-12-13 RX ADMIN — SODIUM CHLORIDE, POTASSIUM CHLORIDE, SODIUM LACTATE AND CALCIUM CHLORIDE 125 ML/HR: 600; 310; 30; 20 INJECTION, SOLUTION INTRAVENOUS at 06:36

## 2018-12-13 RX ADMIN — PROPOFOL 100 MG: 10 INJECTION, EMULSION INTRAVENOUS at 07:25

## 2018-12-13 NOTE — ANESTHESIA PREPROCEDURE EVALUATION
Anesthesia Evaluation     Patient summary reviewed                Airway   Mallampati: II  TM distance: >3 FB  Neck ROM: full  No difficulty expected and Possible difficult intubation  Dental - normal exam     Pulmonary     breath sounds clear to auscultation  Cardiovascular   Exercise tolerance: good (4-7 METS)    Rhythm: regular  Rate: normal        Neuro/Psych  GI/Hepatic/Renal/Endo      Musculoskeletal     Abdominal    Substance History      OB/GYN          Other                        Anesthesia Plan    ASA 2     general     intravenous induction   Anesthetic plan, all risks, benefits, and alternatives have been provided, discussed and informed consent has been obtained with: patient.

## 2018-12-13 NOTE — ANESTHESIA PROCEDURE NOTES
Airway  Date/Time: 12/13/2018 7:14 AM  Airway not difficult    General Information and Staff    Anesthesiologist: Ricky Hunter MD    Indications and Patient Condition  Indications for airway management: airway protection      Final Airway Details  Final airway type: supraglottic airway      Successful airway: classic  Size 3    Number of attempts at approach: 1

## 2018-12-13 NOTE — ANESTHESIA POSTPROCEDURE EVALUATION
Patient: Johana Corrigan    Procedure Summary     Date:  12/13/18 Room / Location:   [facility]    Anesthesia Start:  0702 Anesthesia Stop:  0752    Procedure:  BRONCHOSCOPY WITH (N/A Bronchus) Diagnosis:      Surgeon:  Gee Kenney MD Provider:  Ricky Hunter MD    Anesthesia Type:  general ASA Status:  2          Anesthesia Type: general  Last vitals  BP   126/82 (12/13/18 0814)   Temp   36.8 °C (98.2 °F) (12/13/18 0626)   Pulse   76 (12/13/18 0814)   Resp   14 (12/13/18 0626)     SpO2   96 % (12/13/18 0814)     Post Anesthesia Care and Evaluation    Patient location during evaluation: PHASE II  Anesthetic complications: No anesthetic complications

## 2018-12-13 NOTE — H&P
Johana Corrigan  12/10/2018 5:31 AM  Location: [facility]  Patient #: [id number]  : 1940   / Language: English / Race: White  Female      History of Present Illness (Gee Kenney MD; 12/10/2018 3:44 PM)  The patient is a 78 year old female who presents to discuss consultation. 78 yr with finding of LLL infiltrate noted incidentally on CT abdomen done for UTI in 2018. She had fu CT chest with persistent infiltrates 18. She is here to discuss. She has SOA when moderate to severe exertion. She has PND but denies cough or phlegm. She denies wheezing. No chest pain. She has occasional trouble swallowing food - 3 times a month. No hx stroke. She quit smoking , 37 pack yrs or less. She has never used inhaler.      Problem List/Past Medical (Gee Kenney MD; 2018 7:08 AM)  Maxillary sinusitis (J32.0)    Problems Reconciled    Seasonal allergies   [employer] 12/10/2018  Sinus drainage (J34.89)    Thyroid dysfunction (E07.9)    Cellulitis (L03.90)    HLD (hyperlipidemia) (E78.5)      Past Surgical History (Arun Dias; 12/10/2018 2:17 PM)  Cataract Surgery   [employer] 12/10/2018  Tonsillectomy   [employer] 12/10/2018  Tubal Ligation   [employer] 12/10/2018    Allergies (Erin Fuentes; 12/10/2018 3:22 PM)  No Known Drug Allergies  [12/10/2018]:  Allergies Reconciled      Medication History (Erin Fuentes; 12/10/2018 3:28 PM)  Levothyroxine Sodium  (75MCG Tablet, 1 Oral daily) Active.  Rosuvastatin Calcium  (5MG Tablet, 1 Oral daily) Active.  Medications Reconciled     Social History (Erin Fuentes; 12/10/2018 3:30 PM)  Tobacco Use   Former smoker. Dc'd  less than 1ppd for 37 years  Alcohol use   Moderate alcohol use. [employer] 12/10/2018  No drug use   [employer] 12/10/2018  Highest Education Level Attained   Some college. [employer] 12/10/2018  Exercise   daily. [employer] 12/10/2018    Family History (Arun Dias; 12/10/2018 2:17 PM)  Heart Disease   Mother, Father, Brother. Arun  12/10/2018  None   Sister, Son, Daughter. [employer] 12/10/2018    Health Maintenance History (Erin Fuentes; 12/10/2018 3:28 PM)  Flu Vaccine   11/2018 pcp  Pneumovax   Prevnar  11/2018 pcp        Review of Systems (Ruchiscarlet Dias; 12/10/2018 2:17 PM)  General Not Present- Chills, Fever, Night Sweats, Poor Appetite and Weight Loss.  Skin Not Present- Nail Changes and Rash.  HEENT Not Present- Ear Pain, Eye Pain, Hoarseness, Nasal Congestion, Post Nasal Drip, Recurrent Nose Bleeds and Sores in Mouth.  Respiratory Present- Shortness of Breath and Wakes up from Sleep Wheezing or Short of Breath. Not Present- Awakens Exhausted, Bloody sputum, Chest Discomfort/Tightness, Cough, Excessive Daytime Sleepiness, Snoring, Trouble Falling Asleep and Wheezing.  Cardiovascular Not Present- Chest Pain, Difficulty Breathing Lying Down, Palpitations and Swollen Ankles or Legs.  Gastrointestinal Not Present- Belching, Difficulty Swallowing, Frequent Heartburn and Indigestion.  Male Genitourinary Not Present- Difficulty Emptying Bladder and Frequent Urination.  Female Genitourinary Not Present- Difficulty Emptying Bladder, Frequent Urination and Pregnancy.  Musculoskeletal Not Present- Joint Swelling and Muscle Weakness.  Neurological Not Present- Difficulty Speaking and Seizures.  Psychiatric Not Present- Anxiety and Depression.  Endocrine Not Present- Cold Intolerance and Excessive Sweating.  Hematology Not Present- Enlarged Lymph Nodes and Excessive bleeding.    Vitals (Aliyah Ly; 12/10/2018 2:34 PM)  12/10/2018 2:24 PM  Weight: 133 lb   Height: 59 in  Neck: 12.25 in     Body Surface Area: 1.55 m²   Body Mass Index: 26.86 kg/m²    Pulse: 67 (Regular)    Resp.: 16 (Unlabored)    P.OX: 97% (Room air)  BP: 122/70 (Sitting, Left Arm, Standard)              Physical Exam (Gee Kenney MD; 12/10/2018 3:46 PM)  General  General Appearance - Not in acute distress.  Build & Nutrition - Well developed.  Gait - Ambulatory.  Oxygen  Therapy - Breathing Room Air.    Head and Neck  Trachea - midline.  Thyroid  Gland Characteristics - normal size and consistency.    ENMT  Global Assessment  Upon examination of the ears, nose, mouth and throat - external inspection reveals no scars, masses or lesions.  Mouth and Throat  Oral Cavity/Oropharynx - Teeth - Inspection reveals normal dentation. Tongue - normal, not enlarged. Oropharynx - Mallampati III, no thrush present. Tonsils - Characteristics - Bilateral - no hypertrophy.    Chest and Lung Exam  Inspection  Chest Wall - Normal. Shape - Normal and Symmetric. Movements - Normal and Symmetrical. Accessory muscles - No use of accessory muscles in breathing.  Percussion  Quality and Intensity - Percussion of the chest reveals - Normal resonance. Diaphragm - Percussion Normal.  Palpation  Palpation of the chest reveals - Non-tender.  Auscultation  Breath sounds - Normal and Clear. Adventitious sounds - No Adventitious sounds.    Cardiovascular  Auscultation  Rhythm - Regular. Rate - heart rate is normal. Murmurs & Other Heart Sounds - Auscultation of the heart reveals - No Murmurs. Heart Sounds - Normal heart sounds.    Abdomen  Palpation/Percussion  Palpation and Percussion of the abdomen reveal - soft and non-tender. Liver - Normal. Spleen - Normal.  Auscultation  - Bowel sounds normal.    Peripheral Vascular  Upper Extremity  Inspection - Bilateral - Pink nail beds, No Digital clubbing.  Lower Extremity  Palpation - Edema - Bilateral - No edema.    Neuropsychiatric  Orientation - oriented X3.  The patient's mood and affect are described as  - normal.  Judgment and Insight - insight is appropriate concerning matters relevant to self.    Lymphatic  General  Cervical Nodes - Grossly normal bilaterally. Supraclavicular Nodes - Grossly normal bilaterally.        Assessment & Plan (Gee Kenney MD; 12/13/2018 7:06 AM)  Pulmonary infiltrate (R91.8)  Impression: Reviewed with patient. Bronch with bx for cultures  and histo. Will need fu CT in 2 months.  I have discussed bronchoscopy with bronchoalveolar lavage and transbronchial biopsy with the patient/caregiver. Alternatives, potential benefits and risks were discussed. These include (but not limited to) respiratory failure requiring supplemental oxygen to intubation and mechanical ventilation; bleeding risk requiring surgical intervention (rarely); pneumothorax risk requiring possible placement of chest tube and therefore inpatient stay following procedure.  Patient/caregiver was asked to stop all anti-platelet and anticoagulant medications. They were asked to remain nil per orally from midnight of date of test and to bring a  to drive them back home after procedure.  COPD (chronic obstructive pulmonary disease) (J44.9)  Impression: Mild and mostly with air trapping.  Current Plans  Started Ventolin  (90 Base)MCG/ACT, 2 (two) Puff every four hours, as needed, 1 Inhaler, 30 days starting 12/10/2018, Ref. x5.  SPIROMETRY WITH INTERPRETATION (61197)  THORACIC GAS VOLUME/FRC/SVC (11332)  CO DIFFUSING CAPACITY (48967)  Pt Education - How to access health information online: discussed with patient and provided information.  Assessed tobacco user (1000F)  PLANS(E849.9)  Current Plans  Follow up in 1 month or as needed

## 2018-12-14 LAB
CYTO UR: NORMAL
LAB AP CASE REPORT: NORMAL
PATH REPORT.FINAL DX SPEC: NORMAL
PATH REPORT.GROSS SPEC: NORMAL

## 2018-12-15 LAB
BACTERIA SPEC AEROBE CULT: NORMAL
BACTERIA SPEC RESP CULT: NORMAL
GRAM STN SPEC: NORMAL

## 2018-12-17 ENCOUNTER — OFFICE VISIT (OUTPATIENT)
Dept: CARDIOLOGY | Facility: CLINIC | Age: 78
End: 2018-12-17

## 2018-12-17 VITALS
WEIGHT: 133 LBS | HEART RATE: 71 BPM | HEIGHT: 60 IN | DIASTOLIC BLOOD PRESSURE: 78 MMHG | BODY MASS INDEX: 26.11 KG/M2 | SYSTOLIC BLOOD PRESSURE: 132 MMHG

## 2018-12-17 DIAGNOSIS — I51.89 CARDIAC MASS: Primary | ICD-10-CM

## 2018-12-17 PROCEDURE — 99203 OFFICE O/P NEW LOW 30 MIN: CPT | Performed by: INTERNAL MEDICINE

## 2018-12-17 RX ORDER — ALBUTEROL SULFATE 90 UG/1
AEROSOL, METERED RESPIRATORY (INHALATION)
COMMUNITY
Start: 2018-12-10 | End: 2020-01-16

## 2018-12-17 NOTE — PROGRESS NOTES
Subjective:     Encounter Date:12/17/2018      Patient ID: Johana Corrigan is a 78 y.o. female.    Chief Complaint:  This is a 78-year-old lady with a past medical history of hyperlipidemia, hypothyroidism, dyspnea.  She states that she has dyspnea particularly at night.  It appears that she has PND because she wakes up in the middle the night with difficulty breathing or panting, however she has no orthopnea, lower extremity edema, dyspnea on exertion.  She has not noticed any increased nighttime urination either.  She underwent a echocardiogram in our office which showed a possible right atrial mass versus echo artifact.  She was referred for evaluation of this.  She is well appearing and has no complaints today.  She is interested in further testing for her possible cardiac mass, however she wants to do this after traveling to [facility] in [place] for the holidays.  She is accompanied by her .        The following portions of the patient's history were reviewed and updated as appropriate: allergies, current medications, past family history, past medical history, past social history, past surgical history and problem list.    Past Medical History:   Diagnosis Date   • Allergic    • Arthritis    • Cataract    • Dyspnea    • Headache    • Hyperlipidemia    • Hyperthyroidism    • Leukopenia    • Osteoarthritis        Family History   Problem Relation Age of Onset   • Heart disease Father    • Heart disease Brother    • Heart disease Mother        Social History     Socioeconomic History   • Marital status:      Spouse name: Not on file   • Number of children: Not on file   • Years of education: Not on file   • Highest education level: Not on file   Tobacco Use   • Smoking status: Former Smoker   • Smokeless tobacco: Never Used   Substance and Sexual Activity   • Alcohol use: Yes     Alcohol/week: 0.6 oz     Types: 1 Glasses of wine per week     Comment: daily   • Drug use: No   • Sexual  activity: Defer       No Known Allergies    Past Surgical History:   Procedure Laterality Date   • NOSE SURGERY     • SUBMANDIBULAR GLAND EXCISION     • TONSILLECTOMY     • TUBAL ABDOMINAL LIGATION         Review of Systems   Cardiovascular: Negative for chest pain, claudication, dyspnea on exertion, irregular heartbeat, leg swelling, near-syncope, orthopnea and syncope.   Respiratory: Positive for shortness of breath and sleep disturbances due to breathing. Negative for cough, sputum production and wheezing.        Procedures       Objective:     Physical Exam  GEN: no distress, alert and oriented  Lungs CTAB, no rales or wheezes  Chest: no abnormalities  Heart: RRR, no murmurs  Abdo: soft,  Nontender, nondistended  Extr: no edema, +2 DP and 2+ carotid pulses b/l  Skin: no rash or bruising  Psych: organized thought, normal behavior and affect    Lab Review:         Assessment:          Diagnosis Plan   1. Cardiac mass  MRI Cardiac For Morphology Without Contrast          Plan:       1. Cardiac mass- There appears to be an echodensity which is either a right atrial mass or an artifact on echocardiogram. I am inclined to believe this is artifact. I think it is reasonable to proceed with further testing to evaluate this possible mass further. We discussed LASHAE or MRI. The patient feels she can tolerate a MRI better than the more invasive LASHEA, which I think is reasonable. She wants to complete this after the holidays, which I also think is reasonable. She will schedule the MRI for early January and I will follow up with her regarding the results.       Jannet Richard MD  12/17/18

## 2018-12-19 LAB
LAB AP CASE REPORT: NORMAL
PATH REPORT.FINAL DX SPEC: NORMAL
PATH REPORT.GROSS SPEC: NORMAL

## 2019-01-10 LAB — FUNGUS WND CULT: NORMAL

## 2019-01-22 ENCOUNTER — TRANSCRIBE ORDERS (OUTPATIENT)
Dept: ADMINISTRATIVE | Facility: HOSPITAL | Age: 79
End: 2019-01-22

## 2019-01-22 ENCOUNTER — LAB (OUTPATIENT)
Dept: LAB | Facility: HOSPITAL | Age: 79
End: 2019-01-22
Attending: INTERNAL MEDICINE

## 2019-01-22 DIAGNOSIS — I51.89 CARDIAC MASS: Primary | ICD-10-CM

## 2019-01-22 DIAGNOSIS — I51.89 CARDIAC MASS: ICD-10-CM

## 2019-01-22 LAB
ANION GAP SERPL CALCULATED.3IONS-SCNC: 12.5 MMOL/L
BUN BLD-MCNC: 17 MG/DL (ref 8–23)
BUN/CREAT SERPL: 22.7 (ref 7–25)
CALCIUM SPEC-SCNC: 9.6 MG/DL (ref 8.6–10.5)
CHLORIDE SERPL-SCNC: 99 MMOL/L (ref 98–107)
CO2 SERPL-SCNC: 26.5 MMOL/L (ref 22–29)
CREAT BLD-MCNC: 0.75 MG/DL (ref 0.57–1)
GFR SERPL CREATININE-BSD FRML MDRD: 75 ML/MIN/1.73
GLUCOSE BLD-MCNC: 104 MG/DL (ref 65–99)
POTASSIUM BLD-SCNC: 4.2 MMOL/L (ref 3.5–5.2)
SODIUM BLD-SCNC: 138 MMOL/L (ref 136–145)

## 2019-01-22 PROCEDURE — 36415 COLL VENOUS BLD VENIPUNCTURE: CPT

## 2019-01-22 PROCEDURE — 80048 BASIC METABOLIC PNL TOTAL CA: CPT

## 2019-01-24 LAB
MYCOBACTERIUM SPEC CULT: NORMAL
NIGHT BLUE STAIN TISS: NORMAL

## 2019-01-25 ENCOUNTER — HOSPITAL ENCOUNTER (OUTPATIENT)
Dept: MRI IMAGING | Facility: HOSPITAL | Age: 79
End: 2019-01-25
Attending: INTERNAL MEDICINE

## 2019-01-28 ENCOUNTER — TELEPHONE (OUTPATIENT)
Dept: CARDIOLOGY | Facility: CLINIC | Age: 79
End: 2019-01-28

## 2019-01-28 DIAGNOSIS — I51.89 CARDIAC MASS: Primary | ICD-10-CM

## 2019-01-28 DIAGNOSIS — D15.1 BENIGN NEOPLASM OF HEART: ICD-10-CM

## 2019-01-28 NOTE — TELEPHONE ENCOUNTER
Oh sorry I thought william had spoken to her, I probably misunderstood. Can you call her and let her know that the radiology department thought a CTA would be better to look at the mass and im sorry for the confusion. thanks

## 2019-01-28 NOTE — TELEPHONE ENCOUNTER
Patient called questioning what test you want to order.  She said her scan on Friday was canceled and she needs to know what is being scheduled now and when.  She called from [phone]  I see you ordered a CT of her heart today.  Did you want me to call her back, or do you want to discuss it with her?    Thank you,  Mirta

## 2019-01-29 NOTE — TELEPHONE ENCOUNTER
Spoke with Ms. Corrigan.  Joslyn, the order is placed.  Can you make sure she gets this scheduled?  Thank you!  Mirta

## 2019-02-04 ENCOUNTER — TRANSCRIBE ORDERS (OUTPATIENT)
Dept: ADMINISTRATIVE | Facility: HOSPITAL | Age: 79
End: 2019-02-04

## 2019-02-18 ENCOUNTER — TELEPHONE (OUTPATIENT)
Dept: CARDIOLOGY | Facility: CLINIC | Age: 79
End: 2019-02-18

## 2019-02-18 NOTE — TELEPHONE ENCOUNTER
Elzbieta with the radiology department is calling asking if the patients heart rate needs to be slowed down for imaging, like usual CTA?    Thanks  Mare Manrique RN  Triage nurse    Call Elzbieta with order [phone]  Mare Manrique RN  Triage nurse

## 2019-02-22 ENCOUNTER — HOSPITAL ENCOUNTER (OUTPATIENT)
Dept: CT IMAGING | Facility: HOSPITAL | Age: 79
End: 2019-02-22
Attending: INTERNAL MEDICINE

## 2019-02-22 ENCOUNTER — HOSPITAL ENCOUNTER (OUTPATIENT)
Dept: CT IMAGING | Facility: HOSPITAL | Age: 79
Discharge: HOME | End: 2019-02-22
Admitting: INTERNAL MEDICINE

## 2019-02-22 DIAGNOSIS — I51.89 CARDIAC MASS: ICD-10-CM

## 2019-02-22 DIAGNOSIS — D15.1 BENIGN NEOPLASM OF HEART: ICD-10-CM

## 2019-02-22 LAB — CREAT BLDA-MCNC: 0.8 MG/DL (ref 0.6–1.3)

## 2019-02-22 PROCEDURE — 75574 CT ANGIO HRT W/3D IMAGE: CPT | Performed by: INTERNAL MEDICINE

## 2019-02-22 PROCEDURE — 82565 ASSAY OF CREATININE: CPT

## 2019-02-22 PROCEDURE — 75574 CT ANGIO HRT W/3D IMAGE: CPT

## 2019-02-22 PROCEDURE — 0 IOPAMIDOL PER 1 ML: Performed by: INTERNAL MEDICINE

## 2019-02-22 RX ADMIN — IOPAMIDOL 100 ML: 755 INJECTION, SOLUTION INTRAVENOUS at 10:35

## 2019-02-25 ENCOUNTER — TELEPHONE (OUTPATIENT)
Dept: INTERNAL MEDICINE | Facility: CLINIC | Age: 79
End: 2019-02-25

## 2019-02-25 ENCOUNTER — TELEPHONE (OUTPATIENT)
Dept: CARDIOLOGY | Facility: CLINIC | Age: 79
End: 2019-02-25

## 2019-02-25 DIAGNOSIS — I51.89 CARDIAC MASS: Primary | ICD-10-CM

## 2019-02-25 NOTE — TELEPHONE ENCOUNTER
Pt is calling for CTA results done on 03/22/2019, results in [facility].    Pt can be reached at [phone]

## 2019-02-27 ENCOUNTER — TRANSCRIBE ORDERS (OUTPATIENT)
Dept: ADMINISTRATIVE | Facility: HOSPITAL | Age: 79
End: 2019-02-27

## 2019-02-27 DIAGNOSIS — R91.8 PULMONARY INFILTRATE: Primary | ICD-10-CM

## 2019-03-11 ENCOUNTER — TRANSCRIBE ORDERS (OUTPATIENT)
Dept: ADMINISTRATIVE | Facility: HOSPITAL | Age: 79
End: 2019-03-11

## 2019-03-11 ENCOUNTER — LAB (OUTPATIENT)
Dept: LAB | Facility: HOSPITAL | Age: 79
End: 2019-03-11

## 2019-03-11 DIAGNOSIS — Z01.810 PRE-OPERATIVE CARDIOVASCULAR EXAMINATION: Primary | ICD-10-CM

## 2019-03-11 DIAGNOSIS — Z01.810 PRE-OPERATIVE CARDIOVASCULAR EXAMINATION: ICD-10-CM

## 2019-03-11 DIAGNOSIS — Z13.6 SCREENING FOR ISCHEMIC HEART DISEASE: ICD-10-CM

## 2019-03-11 LAB
ANION GAP SERPL CALCULATED.3IONS-SCNC: 15.4 MMOL/L
BASOPHILS # BLD AUTO: 0.03 10*3/MM3 (ref 0–0.2)
BASOPHILS NFR BLD AUTO: 0.8 % (ref 0–1.5)
BUN BLD-MCNC: 11 MG/DL (ref 8–23)
BUN/CREAT SERPL: 15.1 (ref 7–25)
CALCIUM SPEC-SCNC: 9.7 MG/DL (ref 8.6–10.5)
CHLORIDE SERPL-SCNC: 103 MMOL/L (ref 98–107)
CO2 SERPL-SCNC: 24.6 MMOL/L (ref 22–29)
CREAT BLD-MCNC: 0.73 MG/DL (ref 0.57–1)
DEPRECATED RDW RBC AUTO: 43.8 FL (ref 37–54)
EOSINOPHIL # BLD AUTO: 0.03 10*3/MM3 (ref 0–0.4)
EOSINOPHIL NFR BLD AUTO: 0.8 % (ref 0.3–6.2)
ERYTHROCYTE [DISTWIDTH] IN BLOOD BY AUTOMATED COUNT: 12.6 % (ref 12.3–15.4)
GFR SERPL CREATININE-BSD FRML MDRD: 77 ML/MIN/1.73
GLUCOSE BLD-MCNC: 107 MG/DL (ref 65–99)
HCT VFR BLD AUTO: 46.2 % (ref 34–46.6)
HGB BLD-MCNC: 14.8 G/DL (ref 12–15.9)
IMM GRANULOCYTES # BLD AUTO: 0.01 10*3/MM3 (ref 0–0.05)
IMM GRANULOCYTES NFR BLD AUTO: 0.3 % (ref 0–0.5)
LYMPHOCYTES # BLD AUTO: 1.1 10*3/MM3 (ref 0.7–3.1)
LYMPHOCYTES NFR BLD AUTO: 28 % (ref 19.6–45.3)
MCH RBC QN AUTO: 30.3 PG (ref 26.6–33)
MCHC RBC AUTO-ENTMCNC: 32 G/DL (ref 31.5–35.7)
MCV RBC AUTO: 94.7 FL (ref 79–97)
MONOCYTES # BLD AUTO: 0.31 10*3/MM3 (ref 0.1–0.9)
MONOCYTES NFR BLD AUTO: 7.9 % (ref 5–12)
NEUTROPHILS # BLD AUTO: 2.45 10*3/MM3 (ref 1.4–7)
NEUTROPHILS NFR BLD AUTO: 62.2 % (ref 42.7–76)
NRBC BLD AUTO-RTO: 0 /100 WBC (ref 0–0)
PLATELET # BLD AUTO: 209 10*3/MM3 (ref 140–450)
PMV BLD AUTO: 10.1 FL (ref 6–12)
POTASSIUM BLD-SCNC: 3.9 MMOL/L (ref 3.5–5.2)
RBC # BLD AUTO: 4.88 10*6/MM3 (ref 3.77–5.28)
SODIUM BLD-SCNC: 143 MMOL/L (ref 136–145)
WBC NRBC COR # BLD: 3.93 10*3/MM3 (ref 3.4–10.8)

## 2019-03-11 PROCEDURE — 85025 COMPLETE CBC W/AUTO DIFF WBC: CPT

## 2019-03-11 PROCEDURE — 36415 COLL VENOUS BLD VENIPUNCTURE: CPT

## 2019-03-11 PROCEDURE — 80048 BASIC METABOLIC PNL TOTAL CA: CPT

## 2019-03-18 ENCOUNTER — HOSPITAL ENCOUNTER (OUTPATIENT)
Dept: CARDIOLOGY | Facility: HOSPITAL | Age: 79
Discharge: HOME | End: 2019-03-18
Admitting: INTERNAL MEDICINE

## 2019-03-18 ENCOUNTER — HOSPITAL ENCOUNTER (OUTPATIENT)
Dept: CARDIOLOGY | Facility: HOSPITAL | Age: 79
Discharge: HOME | End: 2019-03-18

## 2019-03-18 VITALS
OXYGEN SATURATION: 95 % | HEART RATE: 62 BPM | SYSTOLIC BLOOD PRESSURE: 111 MMHG | RESPIRATION RATE: 11 BRPM | DIASTOLIC BLOOD PRESSURE: 58 MMHG

## 2019-03-18 VITALS
DIASTOLIC BLOOD PRESSURE: 70 MMHG | OXYGEN SATURATION: 98 % | HEART RATE: 63 BPM | SYSTOLIC BLOOD PRESSURE: 121 MMHG | RESPIRATION RATE: 16 BRPM

## 2019-03-18 DIAGNOSIS — I51.89 CARDIAC MASS: ICD-10-CM

## 2019-03-18 LAB
BH CV ECHO MEAS - BSA(HAYCOCK): 1.6 M^2
BH CV ECHO MEAS - BSA: 1.5 M^2
BH CV ECHO MEAS - BZI_BMI: 24.6 KILOGRAMS/M^2
BH CV ECHO MEAS - BZI_METRIC_HEIGHT: 152.4 CM
BH CV ECHO MEAS - BZI_METRIC_WEIGHT: 57.2 KG

## 2019-03-18 PROCEDURE — 93325 DOPPLER ECHO COLOR FLOW MAPG: CPT

## 2019-03-18 PROCEDURE — 94760 N-INVAS EAR/PLS OXIMETRY 1: CPT

## 2019-03-18 PROCEDURE — 93325 DOPPLER ECHO COLOR FLOW MAPG: CPT | Performed by: INTERNAL MEDICINE

## 2019-03-18 PROCEDURE — 93312 ECHO TRANSESOPHAGEAL: CPT

## 2019-03-18 PROCEDURE — 25010000002 MIDAZOLAM PER 1 MG: Performed by: INTERNAL MEDICINE

## 2019-03-18 PROCEDURE — 25010000002 FENTANYL CITRATE (PF) 100 MCG/2ML SOLUTION: Performed by: INTERNAL MEDICINE

## 2019-03-18 PROCEDURE — 93312 ECHO TRANSESOPHAGEAL: CPT | Performed by: INTERNAL MEDICINE

## 2019-03-18 PROCEDURE — 99152 MOD SED SAME PHYS/QHP 5/>YRS: CPT

## 2019-03-18 PROCEDURE — 93321 DOPPLER ECHO F-UP/LMTD STD: CPT | Performed by: INTERNAL MEDICINE

## 2019-03-18 PROCEDURE — 93321 DOPPLER ECHO F-UP/LMTD STD: CPT

## 2019-03-18 RX ORDER — FENTANYL CITRATE 50 UG/ML
INJECTION, SOLUTION INTRAMUSCULAR; INTRAVENOUS
Status: COMPLETED | OUTPATIENT
Start: 2019-03-18 | End: 2019-03-18

## 2019-03-18 RX ORDER — SODIUM CHLORIDE 9 MG/ML
INJECTION, SOLUTION INTRAVENOUS
Status: COMPLETED | OUTPATIENT
Start: 2019-03-18 | End: 2019-03-18

## 2019-03-18 RX ORDER — MIDAZOLAM HYDROCHLORIDE 1 MG/ML
INJECTION INTRAMUSCULAR; INTRAVENOUS
Status: COMPLETED | OUTPATIENT
Start: 2019-03-18 | End: 2019-03-18

## 2019-03-18 RX ADMIN — LIDOCAINE HYDROCHLORIDE 5 ML: 20 SOLUTION ORAL; TOPICAL at 08:34

## 2019-03-18 RX ADMIN — MIDAZOLAM 1 MG: 1 INJECTION INTRAMUSCULAR; INTRAVENOUS at 08:48

## 2019-03-18 RX ADMIN — FENTANYL CITRATE 50 MCG: 50 INJECTION INTRAMUSCULAR; INTRAVENOUS at 08:42

## 2019-03-18 RX ADMIN — SODIUM CHLORIDE 9 ML/HR: 9 INJECTION, SOLUTION INTRAVENOUS at 07:56

## 2019-03-18 RX ADMIN — FENTANYL CITRATE 25 MCG: 50 INJECTION INTRAMUSCULAR; INTRAVENOUS at 08:48

## 2019-03-18 RX ADMIN — MIDAZOLAM 2 MG: 1 INJECTION INTRAMUSCULAR; INTRAVENOUS at 08:42

## 2019-03-18 RX ADMIN — BENZOCAINE, BUTAMBEN, AND TETRACAINE HYDROCHLORIDE 1 SPRAY: .028; .004; .004 AEROSOL, SPRAY TOPICAL at 08:36

## 2019-03-18 NOTE — PATIENT INSTRUCTIONS
Moderate Conscious Sedation, Adult, Care After  These instructions provide you with information about caring for yourself after your procedure. Your health care provider may also give you more specific instructions. Your treatment has been planned according to current medical practices, but problems sometimes occur. Call your health care provider if you have any problems or questions after your procedure.  What can I expect after the procedure?  After your procedure, it is common:  · To feel sleepy for several hours.  · To feel clumsy and have poor balance for several hours.  · To have poor judgment for several hours.  · To vomit if you eat too soon.    Follow these instructions at home:  For at least 24 hours after the procedure:  · Do not:  ? Participate in activities where you could fall or become injured.  ? Drive.  ? Use heavy machinery.  ? Drink alcohol.  ? Take sleeping pills or medicines that cause drowsiness.  ? Make important decisions or sign legal documents.  ? Take care of children on your own.  · Rest.  Eating and drinking  · Follow the diet recommended by your health care provider.  · If you vomit:  ? Drink water, juice, or soup when you can drink without vomiting.  ? Make sure you have little or no nausea before eating solid foods.  General instructions  · Have a responsible adult stay with you until you are awake and alert.  · Take over-the-counter and prescription medicines only as told by your health care provider.  · If you smoke, do not smoke without supervision.  · Keep all follow-up visits as told by your health care provider. This is important.  Contact a health care provider if:  · You keep feeling nauseous or you keep vomiting.  · You feel light-headed.  · You develop a rash.  · You have a fever.  Get help right away if:  · You have trouble breathing.  This information is not intended to replace advice given to you by your health care provider. Make sure you discuss any questions you have  with your health care provider.  Document Released: 10/08/2014 Document Revised: 05/22/2017 Document Reviewed: 04/08/2017  Elsevier Interactive Patient Education © 2019 [employer]

## 2019-04-18 ENCOUNTER — HOSPITAL ENCOUNTER (OUTPATIENT)
Dept: CT IMAGING | Facility: HOSPITAL | Age: 79
Discharge: HOME | End: 2019-04-18
Admitting: INTERNAL MEDICINE

## 2019-04-18 DIAGNOSIS — R91.8 PULMONARY INFILTRATE: ICD-10-CM

## 2019-04-18 PROCEDURE — 71250 CT THORAX DX C-: CPT

## 2019-05-30 ENCOUNTER — TELEPHONE (OUTPATIENT)
Dept: INTERNAL MEDICINE | Facility: CLINIC | Age: 79
End: 2019-05-30

## 2019-05-30 NOTE — TELEPHONE ENCOUNTER
"Pt is asking for a rx for \"sure prep rapid dry\" it is a spray you put it on before you put a band aid on your skin. It is maid by [facility]. She bumped her leg and had a neighbor () look at it and she said the band aid rips her skin. I suggested she ask her pharmacist to order it for her as it was not a rx. She wanted me to ask anyway   "

## 2019-05-30 NOTE — TELEPHONE ENCOUNTER
I do think that this is otc and she may ask the pharmacy for this. Alternatively can use gauze with paper tape which is less harsh on skin.

## 2019-06-13 DIAGNOSIS — E78.5 HYPERLIPIDEMIA, UNSPECIFIED HYPERLIPIDEMIA TYPE: Primary | ICD-10-CM

## 2019-06-18 LAB
ALBUMIN SERPL-MCNC: 4.4 G/DL (ref 3.5–5.2)
ALBUMIN/GLOB SERPL: 2.3 G/DL
ALP SERPL-CCNC: 69 U/L (ref 39–117)
ALT SERPL-CCNC: 18 U/L (ref 1–33)
AST SERPL-CCNC: 20 U/L (ref 1–32)
BILIRUB SERPL-MCNC: 0.3 MG/DL (ref 0.2–1.2)
BUN SERPL-MCNC: 11 MG/DL (ref 8–23)
BUN/CREAT SERPL: 14.5 (ref 7–25)
CALCIUM SERPL-MCNC: 9.8 MG/DL (ref 8.6–10.5)
CHLORIDE SERPL-SCNC: 102 MMOL/L (ref 98–107)
CO2 SERPL-SCNC: 28.9 MMOL/L (ref 22–29)
CREAT SERPL-MCNC: 0.76 MG/DL (ref 0.57–1)
GLOBULIN SER CALC-MCNC: 1.9 GM/DL
GLUCOSE SERPL-MCNC: 77 MG/DL (ref 65–99)
POTASSIUM SERPL-SCNC: 4.3 MMOL/L (ref 3.5–5.2)
PROT SERPL-MCNC: 6.3 G/DL (ref 6–8.5)
SODIUM SERPL-SCNC: 143 MMOL/L (ref 136–145)

## 2019-06-21 ENCOUNTER — OFFICE VISIT (OUTPATIENT)
Dept: INTERNAL MEDICINE | Facility: CLINIC | Age: 79
End: 2019-06-21

## 2019-06-21 VITALS
OXYGEN SATURATION: 95 % | SYSTOLIC BLOOD PRESSURE: 142 MMHG | DIASTOLIC BLOOD PRESSURE: 68 MMHG | HEART RATE: 66 BPM | BODY MASS INDEX: 25.19 KG/M2 | WEIGHT: 129 LBS

## 2019-06-21 DIAGNOSIS — E78.5 HYPERLIPIDEMIA, UNSPECIFIED HYPERLIPIDEMIA TYPE: ICD-10-CM

## 2019-06-21 DIAGNOSIS — E03.9 HYPOTHYROIDISM, UNSPECIFIED TYPE: Primary | ICD-10-CM

## 2019-06-21 LAB
CHOLEST SERPL-MCNC: 251 MG/DL (ref 0–200)
HDLC SERPL-MCNC: 91 MG/DL (ref 40–60)
LDLC SERPL CALC-MCNC: 144 MG/DL (ref 0–100)
Lab: NORMAL
TRIGL SERPL-MCNC: 78 MG/DL (ref 0–150)
TSH SERPL DL<=0.005 MIU/L-ACNC: 1.82 MIU/ML (ref 0.27–4.2)
VLDLC SERPL CALC-MCNC: 15.6 MG/DL
WRITTEN AUTHORIZATION: NORMAL

## 2019-06-21 PROCEDURE — 99214 OFFICE O/P EST MOD 30 MIN: CPT | Performed by: INTERNAL MEDICINE

## 2019-06-21 NOTE — PROGRESS NOTES
Chief Complaint   Patient presents with   • Hyperlipidemia   • Hypothyroidism   htn, hyperlip, allergic rhinitis      History of Present Illness   Johana Corrigan is a 78 y.o. female presents for follow up evaluation. She in general is doing well. She has a history of allergic rhinits. She reports some milder sinus congestion but is prone to some drainage when pollen increases.   BP is normotensive at this time. She is not requiring any medications. Her thyroid is clinically euthryoid w/ current medications. She is taking crestor w/ good tolerance. Will add lipid to her recent lab draw. Slightly elevated bp today. She does drink about 4 cups of coffee daily.     The following portions of the patient's history were reviewed and updated as appropriate: allergies, current medications, past family history, past medical history, past social history, past surgical history and problem list.  Current Outpatient Medications on File Prior to Visit   Medication Sig Dispense Refill   • levothyroxine (SYNTHROID) 75 MCG tablet Take 1 tablet by mouth Daily. 90 tablet 3   • rosuvastatin (CRESTOR) 5 MG tablet Take 1 tablet by mouth Daily. 90 tablet 3   • VENTOLIN  (90 Base) MCG/ACT inhaler        No current facility-administered medications on file prior to visit.      Review of Systems   Constitutional: Negative.    HENT: Negative.    Eyes: Negative.    Respiratory: Negative.    Cardiovascular: Negative.    Gastrointestinal: Negative.    Endocrine: Negative.    Genitourinary: Negative.    Musculoskeletal: Negative.    Skin: Negative.    Allergic/Immunologic: Negative.    Neurological: Negative.    Hematological: Negative.    Psychiatric/Behavioral: Negative.        Objective   Physical Exam   Constitutional: She is oriented to person, place, and time. She appears well-developed and well-nourished.   HENT:   Head: Normocephalic and atraumatic.   Right Ear: External ear normal.   Left Ear: External ear normal.   Nose: Nose  normal.   Mouth/Throat: Oropharynx is clear and moist.   Eyes: EOM are normal. Pupils are equal, round, and reactive to light.   Neck: Normal range of motion. Neck supple.   Cardiovascular: Normal rate, regular rhythm and normal heart sounds.   Pulmonary/Chest: Effort normal and breath sounds normal. No respiratory distress.   Abdominal: Soft.   Musculoskeletal: Normal range of motion.   Neurological: She is alert and oriented to person, place, and time.   Skin: Skin is warm and dry.   Psychiatric: She has a normal mood and affect. Her behavior is normal. Judgment and thought content normal.   Nursing note and vitals reviewed.       /68   Pulse 66   Wt 58.5 kg (129 lb)   SpO2 95%   BMI 25.19 kg/m²     Assessment/Plan   Diagnoses and all orders for this visit:    Hypothyroidism, unspecified type  -     TSH  -     Lipid Panel With LDL / HDL Ratio    Hyperlipidemia, unspecified hyperlipidemia type  -     TSH  -     Lipid Panel With LDL / HDL Ratio      Patient has a history of hypothyroidism and hyperlipidemia. She will have tsh and lipid levels added to her recent lab draw. Normal cmp. She is in mindfulness courses and strongly encourage to continue these classes. Patient will monitor bp and possibly reduce caffeine given modest increase in bp today. She will follow up in 6 months or sooner if needed.

## 2019-07-02 RX ORDER — LEVOTHYROXINE SODIUM 0.07 MG/1
75 TABLET ORAL DAILY
Qty: 90 TABLET | Refills: 3 | Status: SHIPPED | OUTPATIENT
Start: 2019-07-02 | End: 2019-07-10 | Stop reason: SDUPTHER

## 2019-07-10 RX ORDER — LEVOTHYROXINE SODIUM 0.07 MG/1
75 TABLET ORAL DAILY
Qty: 90 TABLET | Refills: 3 | Status: SHIPPED | OUTPATIENT
Start: 2019-07-10 | End: 2020-01-16

## 2019-08-15 ENCOUNTER — TELEPHONE (OUTPATIENT)
Dept: INTERNAL MEDICINE | Facility: CLINIC | Age: 79
End: 2019-08-15

## 2019-08-19 ENCOUNTER — OFFICE VISIT (OUTPATIENT)
Dept: INTERNAL MEDICINE | Facility: CLINIC | Age: 79
End: 2019-08-19

## 2019-08-19 VITALS
HEART RATE: 87 BPM | SYSTOLIC BLOOD PRESSURE: 112 MMHG | BODY MASS INDEX: 25.32 KG/M2 | TEMPERATURE: 98.4 F | HEIGHT: 60 IN | OXYGEN SATURATION: 98 % | DIASTOLIC BLOOD PRESSURE: 64 MMHG | WEIGHT: 129 LBS

## 2019-08-19 DIAGNOSIS — J06.9 UPPER RESPIRATORY TRACT INFECTION, UNSPECIFIED TYPE: ICD-10-CM

## 2019-08-19 DIAGNOSIS — N39.0 URINARY TRACT INFECTION WITHOUT HEMATURIA, SITE UNSPECIFIED: Primary | ICD-10-CM

## 2019-08-19 LAB
BILIRUB BLD-MCNC: ABNORMAL MG/DL
CLARITY, POC: ABNORMAL
COLOR UR: ABNORMAL
GLUCOSE UR STRIP-MCNC: ABNORMAL MG/DL
KETONES UR QL: ABNORMAL
LEUKOCYTE EST, POC: ABNORMAL
NITRITE UR-MCNC: NEGATIVE MG/ML
PH UR: 6 [PH] (ref 5–8)
PROT UR STRIP-MCNC: ABNORMAL MG/DL
RBC # UR STRIP: NEGATIVE /UL
SP GR UR: 1.03 (ref 1–1.03)
UROBILINOGEN UR QL: NORMAL

## 2019-08-19 PROCEDURE — 81003 URINALYSIS AUTO W/O SCOPE: CPT | Performed by: INTERNAL MEDICINE

## 2019-08-19 PROCEDURE — 99213 OFFICE O/P EST LOW 20 MIN: CPT | Performed by: INTERNAL MEDICINE

## 2019-08-19 RX ORDER — FLUTICASONE PROPIONATE 50 MCG
2 SPRAY, SUSPENSION (ML) NASAL DAILY
Qty: 1 BOTTLE | Refills: 5 | Status: SHIPPED | OUTPATIENT
Start: 2019-08-19 | End: 2020-01-16

## 2019-08-19 RX ORDER — BENZONATATE 100 MG/1
100 CAPSULE ORAL 3 TIMES DAILY PRN
Qty: 30 CAPSULE | Refills: 4 | Status: SHIPPED | OUTPATIENT
Start: 2019-08-19 | End: 2019-09-03 | Stop reason: SDUPTHER

## 2019-08-19 NOTE — PROGRESS NOTES
Chief Complaint   Patient presents with   • Urinary Tract Infection     Follow up   • Nasal Congestion       History of Present Illness   Johana Corrigan is a 78 y.o. female presents for an urgent treatment center follow up. Patient was traveling and she developed a UTI. She was given macrobid w/ symptomatic improvement. She then developed upper respiratory symptoms. Drainage into her throat w/ a cough. She went to urgent treatment for this and was told that her lungs were clear and she was advised to take allegra and mucinex. She reports some improvement w/ these medications but some persistent drainage. Has woken only once at night after starting this regiment.     The following portions of the patient's history were reviewed and updated as appropriate: allergies, current medications, past family history, past medical history, past social history, past surgical history and problem list.  Current Outpatient Medications on File Prior to Visit   Medication Sig Dispense Refill   • levothyroxine (SYNTHROID) 75 MCG tablet Take 1 tablet by mouth Daily. 90 tablet 3   • rosuvastatin (CRESTOR) 5 MG tablet Take 1 tablet by mouth Daily. 90 tablet 3   • VENTOLIN  (90 Base) MCG/ACT inhaler        No current facility-administered medications on file prior to visit.      Review of Systems   Constitutional: Positive for fatigue.   HENT: Positive for postnasal drip, rhinorrhea and sore throat.    Eyes: Negative.    Respiratory: Positive for cough.    Cardiovascular: Negative.    Gastrointestinal: Negative.    Endocrine: Negative.    Genitourinary: Negative.    Musculoskeletal: Negative.    Skin: Negative.    Allergic/Immunologic: Negative.    Neurological: Negative.    Hematological: Negative.    Psychiatric/Behavioral: Negative.        Objective   Physical Exam   Constitutional: She is oriented to person, place, and time. She appears well-developed and well-nourished.   HENT:   Head: Normocephalic and atraumatic.   Right  "Ear: External ear normal.   Left Ear: External ear normal.   Nose: Nose normal.   Mouth/Throat: Oropharynx is clear and moist.   Eyes: Conjunctivae and EOM are normal. Pupils are equal, round, and reactive to light.   Neck: Normal range of motion. Neck supple.   Cardiovascular: Normal rate, regular rhythm, normal heart sounds and intact distal pulses.   Pulmonary/Chest: Effort normal and breath sounds normal. No respiratory distress.   Abdominal: Soft. Bowel sounds are normal.   Musculoskeletal: Normal range of motion.   Neurological: She is alert and oriented to person, place, and time.   Skin: Skin is warm and dry.   Psychiatric: She has a normal mood and affect. Her behavior is normal. Judgment and thought content normal.   Nursing note and vitals reviewed.       /64   Pulse 87   Temp 98.4 °F (36.9 °C)   Ht 152.4 cm (60\")   Wt 58.5 kg (129 lb)   SpO2 98%   BMI 25.19 kg/m²     Assessment/Plan   Diagnoses and all orders for this visit:    Upper respiratory tract infection, unspecified type    Urinary tract infection without hematuria, site unspecified    Other orders  -     fluticasone (FLONASE) 50 MCG/ACT nasal spray; 2 sprays into the nostril(s) as directed by provider Daily.  -     benzonatate (TESSALON PERLES) 100 MG capsule; Take 1 capsule by mouth 3 (Three) Times a Day As Needed for Cough.      Patient w/ UTI and URI. She will continue mucinex and allegra. Will add flonase and nasal saline. Increase hydration and rest. Urine to be sent for culture. U/a is not suggestive of continued infection. She will f/u prn.            "

## 2019-08-21 LAB
BACTERIA UR CULT: NORMAL
BACTERIA UR CULT: NORMAL

## 2019-08-22 ENCOUNTER — TELEPHONE (OUTPATIENT)
Dept: INTERNAL MEDICINE | Facility: CLINIC | Age: 79
End: 2019-08-22

## 2019-08-23 ENCOUNTER — TELEPHONE (OUTPATIENT)
Dept: INTERNAL MEDICINE | Facility: CLINIC | Age: 79
End: 2019-08-23

## 2019-08-23 NOTE — TELEPHONE ENCOUNTER
Patient states she saw you 8/19/19 & is not feeling any better and is now having facial drainage. She is asking what do you recommend she do?    Thanks    [phone]

## 2019-08-25 RX ORDER — AMOXICILLIN 875 MG/1
875 TABLET, COATED ORAL 2 TIMES DAILY
Qty: 14 TABLET | Refills: 0 | Status: SHIPPED | OUTPATIENT
Start: 2019-08-25 | End: 2019-09-03

## 2019-09-03 ENCOUNTER — OFFICE VISIT (OUTPATIENT)
Dept: INTERNAL MEDICINE | Facility: CLINIC | Age: 79
End: 2019-09-03

## 2019-09-03 VITALS
TEMPERATURE: 98.2 F | WEIGHT: 132 LBS | SYSTOLIC BLOOD PRESSURE: 140 MMHG | HEART RATE: 71 BPM | DIASTOLIC BLOOD PRESSURE: 74 MMHG | OXYGEN SATURATION: 94 % | BODY MASS INDEX: 25.91 KG/M2 | HEIGHT: 60 IN

## 2019-09-03 DIAGNOSIS — J45.909 BRONCHITIS, ALLERGIC, UNSPECIFIED ASTHMA SEVERITY, UNCOMPLICATED: ICD-10-CM

## 2019-09-03 DIAGNOSIS — J06.9 UPPER RESPIRATORY TRACT INFECTION, UNSPECIFIED TYPE: Primary | ICD-10-CM

## 2019-09-03 PROCEDURE — 99213 OFFICE O/P EST LOW 20 MIN: CPT | Performed by: INTERNAL MEDICINE

## 2019-09-03 RX ORDER — LEVALBUTEROL INHALATION SOLUTION 0.63 MG/3ML
0.63 SOLUTION RESPIRATORY (INHALATION) EVERY 6 HOURS PRN
Status: DISCONTINUED | OUTPATIENT
Start: 2019-09-03 | End: 2021-11-03

## 2019-09-03 RX ORDER — MONTELUKAST SODIUM 10 MG/1
10 TABLET ORAL NIGHTLY
Qty: 90 TABLET | Refills: 2 | Status: SHIPPED | OUTPATIENT
Start: 2019-09-03 | End: 2020-06-26

## 2019-09-03 RX ORDER — BENZONATATE 100 MG/1
100 CAPSULE ORAL 3 TIMES DAILY PRN
Qty: 30 CAPSULE | Refills: 4 | Status: SHIPPED | OUTPATIENT
Start: 2019-09-03 | End: 2020-01-16

## 2019-09-03 RX ORDER — METHYLPREDNISOLONE 4 MG/1
TABLET ORAL
Qty: 21 EACH | Refills: 0 | Status: SHIPPED | OUTPATIENT
Start: 2019-09-03 | End: 2019-10-07

## 2019-09-03 NOTE — PROGRESS NOTES
"Chief Complaint   Patient presents with   • Cough   • Nasal Congestion       History of Present Illness   Johana Corrigan is a 78 y.o. female presents for acute care. Patient is having a deep cough, sinus congestion, and fatigue. She is taking tessalon perles and is able to sleep if she does this. She took a course of amoxicillin but symptoms have progressed despite this. She notes that drainage is so pronounced that is causes her to choke. She has a history of sinus allergies. She is taking allegra daily and flonase for this. She denies fevers. She is having some wheezing \"I think\". She has a ventolin inhaler but has not used this.       The following portions of the patient's history were reviewed and updated as appropriate: allergies, current medications, past family history, past medical history, past social history, past surgical history and problem list.  Current Outpatient Medications on File Prior to Visit   Medication Sig Dispense Refill   • fluticasone (FLONASE) 50 MCG/ACT nasal spray 2 sprays into the nostril(s) as directed by provider Daily. 1 bottle 5   • levothyroxine (SYNTHROID) 75 MCG tablet Take 1 tablet by mouth Daily. 90 tablet 3   • rosuvastatin (CRESTOR) 5 MG tablet Take 1 tablet by mouth Daily. 90 tablet 3   • VENTOLIN  (90 Base) MCG/ACT inhaler      • [DISCONTINUED] benzonatate (TESSALON PERLES) 100 MG capsule Take 1 capsule by mouth 3 (Three) Times a Day As Needed for Cough. 30 capsule 4   • [DISCONTINUED] amoxicillin (AMOXIL) 875 MG tablet Take 1 tablet by mouth 2 (Two) Times a Day. 14 tablet 0     No current facility-administered medications on file prior to visit.      Review of Systems   Constitutional: Positive for fatigue.   HENT: Positive for rhinorrhea, sinus pressure, sinus pain, sneezing and sore throat.    Eyes: Negative.    Respiratory: Positive for cough and wheezing.    Cardiovascular: Negative.    Gastrointestinal: Negative.    Endocrine: Negative.    Genitourinary: " "Negative.    Musculoskeletal: Negative.    Skin: Negative.    Allergic/Immunologic: Negative.    Neurological: Positive for headaches.   Hematological: Negative.    Psychiatric/Behavioral: Negative.        Objective   Physical Exam   Constitutional: She is oriented to person, place, and time. She appears well-developed and well-nourished.   HENT:   Head: Normocephalic and atraumatic.   Right Ear: Hearing, tympanic membrane and external ear normal.   Left Ear: Hearing, tympanic membrane and external ear normal.   Nose: Nose normal.   Mouth/Throat: Oropharynx is clear and moist.   Eyes: Conjunctivae and EOM are normal. Pupils are equal, round, and reactive to light.   Neck: Normal range of motion. Neck supple. No thyromegaly present.   Cardiovascular: Normal rate, regular rhythm and normal heart sounds.   No murmur heard.  Pulmonary/Chest: Effort normal and breath sounds normal. Right breast exhibits no mass. Left breast exhibits no mass.   Abdominal: Soft. Bowel sounds are normal. She exhibits no distension. There is no hepatosplenomegaly. There is no tenderness.   Genitourinary: No breast tenderness.   Musculoskeletal: Normal range of motion.   Lymphadenopathy:     She has no cervical adenopathy.   Neurological: She is alert and oriented to person, place, and time.   Skin: Skin is warm and dry.   Psychiatric: She has a normal mood and affect. Her speech is normal and behavior is normal. Judgment and thought content normal. Cognition and memory are normal.   Nursing note and vitals reviewed.       /74   Pulse 71   Temp 98.2 °F (36.8 °C)   Ht 152.4 cm (60\")   Wt 59.9 kg (132 lb)   SpO2 94%   BMI 25.78 kg/m²     Assessment/Plan   Diagnoses and all orders for this visit:    Upper respiratory tract infection, unspecified type    Bronchitis, allergic, unspecified asthma severity, uncomplicated    Other orders  -     methylPREDNISolone (MEDROL, BOGDAN,) 4 MG tablet; Take as directed on package instructions.  -     " benzonatate (TESSALON PERLES) 100 MG capsule; Take 1 capsule by mouth 3 (Three) Times a Day As Needed for Cough.  -     montelukast (SINGULAIR) 10 MG tablet; Take 1 tablet by mouth Every Night.      Patient w/ continued drainage and bronchospasm. No fevers or signs of pneumonia. She will take a course of steroids. Tessalon perles prn. Singulair, allegra, flonase daily. Nasal saline several times a day.  She will follow up routinely.

## 2019-09-17 ENCOUNTER — TRANSCRIBE ORDERS (OUTPATIENT)
Dept: ADMINISTRATIVE | Facility: HOSPITAL | Age: 79
End: 2019-09-17

## 2019-09-17 DIAGNOSIS — Z12.39 SCREENING BREAST EXAMINATION: Primary | ICD-10-CM

## 2019-10-07 ENCOUNTER — OFFICE VISIT (OUTPATIENT)
Dept: INTERNAL MEDICINE | Facility: CLINIC | Age: 79
End: 2019-10-07

## 2019-10-07 VITALS
OXYGEN SATURATION: 98 % | SYSTOLIC BLOOD PRESSURE: 138 MMHG | HEART RATE: 66 BPM | DIASTOLIC BLOOD PRESSURE: 72 MMHG | BODY MASS INDEX: 25.11 KG/M2 | WEIGHT: 133 LBS | HEIGHT: 61 IN

## 2019-10-07 DIAGNOSIS — J98.01 BRONCHOSPASM: Primary | ICD-10-CM

## 2019-10-07 DIAGNOSIS — I10 ESSENTIAL HYPERTENSION: ICD-10-CM

## 2019-10-07 DIAGNOSIS — J30.9 ALLERGIC RHINITIS, UNSPECIFIED SEASONALITY, UNSPECIFIED TRIGGER: ICD-10-CM

## 2019-10-07 DIAGNOSIS — S81.802D WOUND OF LEFT LOWER EXTREMITY, SUBSEQUENT ENCOUNTER: ICD-10-CM

## 2019-10-07 PROCEDURE — 99214 OFFICE O/P EST MOD 30 MIN: CPT | Performed by: INTERNAL MEDICINE

## 2019-10-07 PROCEDURE — 90653 IIV ADJUVANT VACCINE IM: CPT | Performed by: INTERNAL MEDICINE

## 2019-10-07 PROCEDURE — 90471 IMMUNIZATION ADMIN: CPT | Performed by: INTERNAL MEDICINE

## 2019-10-07 PROCEDURE — G0008 ADMIN INFLUENZA VIRUS VAC: HCPCS | Performed by: INTERNAL MEDICINE

## 2019-10-07 PROCEDURE — 90715 TDAP VACCINE 7 YRS/> IM: CPT | Performed by: INTERNAL MEDICINE

## 2019-10-07 NOTE — PROGRESS NOTES
"Chief Complaint   Patient presents with   • Leg Injury     Left leg from    • Cough       History of Present Illness   Johana Corrigan is a 78 y.o. female presents for acute needs. paieint recently w/ bronchitis. She has continued bronchospasm that is w/ a \"barking\" cough. She is able to sleep well w/ tessalon perles and singulair.   Patient struck her leg on her  about 2-3 weeks ago. She feels that this is healing fairly well now. She did go to urgent care and was reassured that she is healing appropriately.   bp has been elevated. She has not felt any changes related to this.     The following portions of the patient's history were reviewed and updated as appropriate: allergies, current medications, past family history, past medical history, past social history, past surgical history and problem list.  Current Outpatient Medications on File Prior to Visit   Medication Sig Dispense Refill   • fluticasone (FLONASE) 50 MCG/ACT nasal spray 2 sprays into the nostril(s) as directed by provider Daily. 1 bottle 5   • levothyroxine (SYNTHROID) 75 MCG tablet Take 1 tablet by mouth Daily. 90 tablet 3   • montelukast (SINGULAIR) 10 MG tablet Take 1 tablet by mouth Every Night. 90 tablet 2   • mupirocin (BACTROBAN) 2 % ointment Apply to affected area with each dressing change.  Change dressing QD or more often as needed. 22 g 0   • rosuvastatin (CRESTOR) 5 MG tablet Take 1 tablet by mouth Daily. 90 tablet 3   • Saline (AYR NASAL MIST ALLERGY/SINUS) 2.65 % solution 1 spray into the nostril(s) as directed by provider 3 (Three) Times a Day. 50 mL 5   • VENTOLIN  (90 Base) MCG/ACT inhaler      • benzonatate (TESSALON PERLES) 100 MG capsule Take 1 capsule by mouth 3 (Three) Times a Day As Needed for Cough. 30 capsule 4   • [DISCONTINUED] methylPREDNISolone (MEDROL, BOGDAN,) 4 MG tablet Take as directed on package instructions. 21 each 0     Current Facility-Administered Medications on File Prior to Visit " "  Medication Dose Route Frequency Provider Last Rate Last Dose   • levalbuterol (XOPENEX) nebulizer solution 0.63 mg  0.63 mg Nebulization Q6H PRN Sola Gómez MD         Review of Systems   Constitutional: Positive for fatigue.   HENT: Positive for postnasal drip.    Eyes: Negative.    Respiratory: Positive for cough.    Cardiovascular: Negative.    Gastrointestinal: Negative.    Endocrine: Negative.    Genitourinary: Negative.    Musculoskeletal: Negative.    Skin: Positive for wound.   Allergic/Immunologic: Negative.    Neurological: Negative.    Hematological: Negative.    Psychiatric/Behavioral: Negative.        Objective   Physical Exam   Constitutional: She is oriented to person, place, and time. She appears well-developed and well-nourished.   HENT:   Head: Normocephalic and atraumatic.   Right Ear: External ear normal.   Left Ear: External ear normal.   Nose: Nose normal.   Mouth/Throat: Oropharynx is clear and moist.   Eyes: Conjunctivae and EOM are normal. Pupils are equal, round, and reactive to light.   Neck: Normal range of motion. Neck supple.   Cardiovascular: Normal rate, regular rhythm and normal heart sounds.   Pulmonary/Chest: Effort normal and breath sounds normal.   Abdominal: Soft. Bowel sounds are normal.   Musculoskeletal: Normal range of motion.   Neurological: She is alert and oriented to person, place, and time.   Skin: Skin is warm and dry.   Left leg wound w/ mild erythema surrounding area.    Psychiatric: She has a normal mood and affect. Her behavior is normal. Judgment and thought content normal.   Nursing note and vitals reviewed.       /72   Pulse 66   Ht 154.9 cm (61\")   Wt 60.3 kg (133 lb)   SpO2 98%   BMI 25.13 kg/m²     Assessment/Plan   Diagnoses and all orders for this visit:    Bronchospasm    Essential hypertension    Allergic rhinitis, unspecified seasonality, unspecified trigger    Wound of left lower extremity, subsequent encounter    Other orders  -     " Fluad Quad >65 years ([id number])      Patient with some continued cough. Feel this is bronchospasm or related to post nasal drainage. She will continue allegra, singulair, and flonase. She will irrigate her nasal passages w/ saline after outside w/ irritants. She will continue tessalon at night. She will call if cough persists and if she develops fever or other signs of illness. She will elevated her left leg to heal level. She will receive a td and flu vac today.

## 2019-10-29 ENCOUNTER — TELEPHONE (OUTPATIENT)
Dept: INTERNAL MEDICINE | Facility: CLINIC | Age: 79
End: 2019-10-29

## 2019-10-29 DIAGNOSIS — M21.612 BUNION OF LEFT FOOT: Primary | ICD-10-CM

## 2019-11-19 ENCOUNTER — OFFICE VISIT (OUTPATIENT)
Dept: INTERNAL MEDICINE | Facility: CLINIC | Age: 79
End: 2019-11-19

## 2019-11-19 ENCOUNTER — HOSPITAL ENCOUNTER (OUTPATIENT)
Dept: MAMMOGRAPHY | Facility: HOSPITAL | Age: 79
Discharge: HOME | End: 2019-11-19
Admitting: INTERNAL MEDICINE

## 2019-11-19 ENCOUNTER — HOSPITAL ENCOUNTER (OUTPATIENT)
Dept: CARDIOLOGY | Facility: HOSPITAL | Age: 79
Discharge: HOME | End: 2019-11-19

## 2019-11-19 VITALS
TEMPERATURE: 97.4 F | HEIGHT: 61 IN | SYSTOLIC BLOOD PRESSURE: 127 MMHG | WEIGHT: 134 LBS | OXYGEN SATURATION: 95 % | BODY MASS INDEX: 25.3 KG/M2 | HEART RATE: 70 BPM | DIASTOLIC BLOOD PRESSURE: 67 MMHG

## 2019-11-19 DIAGNOSIS — M79.662 PAIN AND SWELLING OF LEFT LOWER LEG: Primary | ICD-10-CM

## 2019-11-19 DIAGNOSIS — Z12.39 SCREENING BREAST EXAMINATION: ICD-10-CM

## 2019-11-19 DIAGNOSIS — N30.01 ACUTE CYSTITIS WITH HEMATURIA: ICD-10-CM

## 2019-11-19 DIAGNOSIS — M79.662 PAIN AND SWELLING OF LEFT LOWER LEG: ICD-10-CM

## 2019-11-19 DIAGNOSIS — M79.89 PAIN AND SWELLING OF LEFT LOWER LEG: Primary | ICD-10-CM

## 2019-11-19 DIAGNOSIS — L03.116 CELLULITIS OF LEFT LOWER EXTREMITY: ICD-10-CM

## 2019-11-19 DIAGNOSIS — M79.89 PAIN AND SWELLING OF LEFT LOWER LEG: ICD-10-CM

## 2019-11-19 LAB
BH CV LOWER VASCULAR LEFT COMMON FEMORAL AUGMENT: NORMAL
BH CV LOWER VASCULAR LEFT COMMON FEMORAL COMPETENT: NORMAL
BH CV LOWER VASCULAR LEFT COMMON FEMORAL COMPRESS: NORMAL
BH CV LOWER VASCULAR LEFT COMMON FEMORAL PHASIC: NORMAL
BH CV LOWER VASCULAR LEFT COMMON FEMORAL SPONT: NORMAL
BH CV LOWER VASCULAR LEFT DISTAL FEMORAL COMPRESS: NORMAL
BH CV LOWER VASCULAR LEFT GASTRONEMIUS COMPRESS: NORMAL
BH CV LOWER VASCULAR LEFT GREATER SAPH AK COMPRESS: NORMAL
BH CV LOWER VASCULAR LEFT GREATER SAPH BK COMPRESS: NORMAL
BH CV LOWER VASCULAR LEFT LESSER SAPH COMPRESS: NORMAL
BH CV LOWER VASCULAR LEFT MID FEMORAL AUGMENT: NORMAL
BH CV LOWER VASCULAR LEFT MID FEMORAL COMPETENT: NORMAL
BH CV LOWER VASCULAR LEFT MID FEMORAL COMPRESS: NORMAL
BH CV LOWER VASCULAR LEFT MID FEMORAL PHASIC: NORMAL
BH CV LOWER VASCULAR LEFT MID FEMORAL SPONT: NORMAL
BH CV LOWER VASCULAR LEFT PERONEAL COMPRESS: NORMAL
BH CV LOWER VASCULAR LEFT POPLITEAL AUGMENT: NORMAL
BH CV LOWER VASCULAR LEFT POPLITEAL COMPETENT: NORMAL
BH CV LOWER VASCULAR LEFT POPLITEAL COMPRESS: NORMAL
BH CV LOWER VASCULAR LEFT POPLITEAL PHASIC: NORMAL
BH CV LOWER VASCULAR LEFT POPLITEAL SPONT: NORMAL
BH CV LOWER VASCULAR LEFT POSTERIOR TIBIAL COMPRESS: NORMAL
BH CV LOWER VASCULAR LEFT PROFUNDA FEMORAL COMPRESS: NORMAL
BH CV LOWER VASCULAR LEFT PROXIMAL FEMORAL COMPRESS: NORMAL
BH CV LOWER VASCULAR LEFT SAPHENOFEMORAL JUNCTION COMPRESS: NORMAL
BH CV LOWER VASCULAR RIGHT COMMON FEMORAL AUGMENT: NORMAL
BH CV LOWER VASCULAR RIGHT COMMON FEMORAL COMPETENT: NORMAL
BH CV LOWER VASCULAR RIGHT COMMON FEMORAL COMPRESS: NORMAL
BH CV LOWER VASCULAR RIGHT COMMON FEMORAL PHASIC: NORMAL
BH CV LOWER VASCULAR RIGHT COMMON FEMORAL SPONT: NORMAL
BILIRUB BLD-MCNC: NEGATIVE MG/DL
CLARITY, POC: ABNORMAL
COLOR UR: ABNORMAL
GLUCOSE UR STRIP-MCNC: NEGATIVE MG/DL
KETONES UR QL: NEGATIVE
LEUKOCYTE EST, POC: ABNORMAL
NITRITE UR-MCNC: NEGATIVE MG/ML
PH UR: 6 [PH] (ref 5–8)
PROT UR STRIP-MCNC: ABNORMAL MG/DL
RBC # UR STRIP: ABNORMAL /UL
SP GR UR: 1.02 (ref 1–1.03)
UROBILINOGEN UR QL: NORMAL

## 2019-11-19 PROCEDURE — 77067 SCR MAMMO BI INCL CAD: CPT

## 2019-11-19 PROCEDURE — 81003 URINALYSIS AUTO W/O SCOPE: CPT | Performed by: INTERNAL MEDICINE

## 2019-11-19 PROCEDURE — 77063 BREAST TOMOSYNTHESIS BI: CPT

## 2019-11-19 PROCEDURE — 99214 OFFICE O/P EST MOD 30 MIN: CPT | Performed by: INTERNAL MEDICINE

## 2019-11-19 PROCEDURE — 93971 EXTREMITY STUDY: CPT

## 2019-11-19 RX ORDER — SULFAMETHOXAZOLE AND TRIMETHOPRIM 800; 160 MG/1; MG/1
1 TABLET ORAL 2 TIMES DAILY
Qty: 14 TABLET | Refills: 0 | Status: SHIPPED | OUTPATIENT
Start: 2019-11-19 | End: 2019-11-25 | Stop reason: ALTCHOICE

## 2019-11-19 NOTE — PROGRESS NOTES
Chief Complaint   Patient presents with   • Urinary Frequency     patient states their is blood in her urine    • Leg Swelling       History of Present Illness   Johana Corrigan is a 79 y.o. female presents for acute care. Patient is having urinary frequency and urgency.   She has unilateral leg swelling. She recently traveled to [facility] by vehicle. Swelling started prior to return trip. By the time she returned home it was swollen and oozing. She has a wound that is healing in the pretibial region. She has been wearing flip flops b/c her foot is too swollen to wear shoes.     The following portions of the patient's history were reviewed and updated as appropriate: allergies, current medications, past family history, past medical history, past social history, past surgical history and problem list.  Current Outpatient Medications on File Prior to Visit   Medication Sig Dispense Refill   • benzonatate (TESSALON PERLES) 100 MG capsule Take 1 capsule by mouth 3 (Three) Times a Day As Needed for Cough. 30 capsule 4   • fluticasone (FLONASE) 50 MCG/ACT nasal spray 2 sprays into the nostril(s) as directed by provider Daily. 1 bottle 5   • levothyroxine (SYNTHROID) 75 MCG tablet Take 1 tablet by mouth Daily. 90 tablet 3   • montelukast (SINGULAIR) 10 MG tablet Take 1 tablet by mouth Every Night. 90 tablet 2   • mupirocin (BACTROBAN) 2 % ointment Apply to affected area with each dressing change.  Change dressing QD or more often as needed. 22 g 0   • rosuvastatin (CRESTOR) 5 MG tablet Take 1 tablet by mouth Daily. 90 tablet 3   • Saline (AYR NASAL MIST ALLERGY/SINUS) 2.65 % solution 1 spray into the nostril(s) as directed by provider 3 (Three) Times a Day. 50 mL 5   • VENTOLIN  (90 Base) MCG/ACT inhaler        Current Facility-Administered Medications on File Prior to Visit   Medication Dose Route Frequency Provider Last Rate Last Dose   • levalbuterol (XOPENEX) nebulizer solution 0.63 mg  0.63 mg Nebulization Q6H  "Sola Castanon MD         Review of Systems   Constitutional: Negative.    HENT: Negative.    Eyes: Negative.    Respiratory: Positive for cough.         Improving cough   Cardiovascular: Positive for leg swelling.   Gastrointestinal: Negative.    Endocrine: Negative.    Genitourinary: Positive for frequency and urgency.   Musculoskeletal: Negative.    Skin: Positive for color change.   Allergic/Immunologic: Negative.    Psychiatric/Behavioral: Negative.        Objective   Physical Exam   Constitutional: She is oriented to person, place, and time. She appears well-developed and well-nourished.   HENT:   Head: Normocephalic and atraumatic.   Right Ear: External ear normal.   Left Ear: External ear normal.   Mouth/Throat: Oropharynx is clear and moist.   Eyes: EOM are normal. Pupils are equal, round, and reactive to light.   Neck: Normal range of motion. Neck supple.   Cardiovascular: Normal rate, regular rhythm and normal heart sounds.   Left leg 9.5\"  Right leg 8.5\"   Pulmonary/Chest: Effort normal and breath sounds normal.   Abdominal: Soft. Bowel sounds are normal.   Musculoskeletal: Normal range of motion.   Neurological: She is alert and oriented to person, place, and time.   Skin: Skin is warm.   Left leg wound w/ surrounding erythema   Psychiatric: She has a normal mood and affect. Her behavior is normal. Judgment and thought content normal.   Nursing note and vitals reviewed.       /67 (BP Location: Left arm, Patient Position: Sitting, Cuff Size: Adult)   Pulse 70   Temp 97.4 °F (36.3 °C) (Oral)   Ht 154.9 cm (61\")   Wt 60.8 kg (134 lb)   SpO2 95%   BMI 25.32 kg/m²     Assessment/Plan   Diagnoses and all orders for this visit:    Pain and swelling of left lower leg  -     Cancel: Duplex Venous Lower Extremity - Left CAR; Future  -     Duplex Venous Lower Extremity - Left CAR; Future    Acute cystitis with hematuria  -     POCT urinalysis dipstick, automated  -     Urine Culture - Urine, " Urine, Clean Catch    Cellulitis of left lower extremity    Other orders  -     sulfamethoxazole-trimethoprim (BACTRIM DS) 800-160 MG per tablet; Take 1 tablet by mouth 2 (Two) Times a Day.      Patient w/ acute swelling left lower extremity. Cellulitis v dvt. She also has u/a confirmation of uti. Will empyrically treat with bactrim. Will send urine for culture. She will start anticoagulant if she does in fact have a dvt. She will follow up in 1 week or prn.

## 2019-11-23 LAB
BACTERIA UR CULT: ABNORMAL
BACTERIA UR CULT: ABNORMAL
OTHER ANTIBIOTIC SUSC ISLT: ABNORMAL

## 2019-11-25 ENCOUNTER — DOCUMENTATION (OUTPATIENT)
Dept: INTERNAL MEDICINE | Facility: CLINIC | Age: 79
End: 2019-11-25

## 2019-11-25 NOTE — PROGRESS NOTES
Patient with uti and cellulitis. Given bactrim. patient reports improvement in skin and urinary symptoms. She reports that both have improved. Advised patient that culture shows organism that may not respond to med. She declines need for alternate med but is aware that we should get a repeat u/a in the near future.   rojelio

## 2019-11-26 ENCOUNTER — TELEPHONE (OUTPATIENT)
Dept: INTERNAL MEDICINE | Facility: CLINIC | Age: 79
End: 2019-11-26

## 2019-11-26 RX ORDER — CIPROFLOXACIN 500 MG/1
500 TABLET, FILM COATED ORAL 2 TIMES DAILY
Qty: 14 TABLET | Refills: 0 | Status: SHIPPED | OUTPATIENT
Start: 2019-11-26 | End: 2019-12-03

## 2019-11-26 NOTE — TELEPHONE ENCOUNTER
Pt states she spoke to you last night about a UTI  Said she was to change antibiotics    Call into [phone]  [facility]

## 2020-01-02 DIAGNOSIS — Z00.00 HEALTH CARE MAINTENANCE: ICD-10-CM

## 2020-01-02 DIAGNOSIS — E03.9 HYPOTHYROIDISM, UNSPECIFIED TYPE: ICD-10-CM

## 2020-01-02 DIAGNOSIS — E78.5 HYPERLIPIDEMIA, UNSPECIFIED HYPERLIPIDEMIA TYPE: Primary | ICD-10-CM

## 2020-01-02 DIAGNOSIS — I10 ESSENTIAL HYPERTENSION: ICD-10-CM

## 2020-01-09 LAB
ALBUMIN SERPL-MCNC: 4.6 G/DL (ref 3.5–5.2)
ALBUMIN/GLOB SERPL: 1.8 G/DL
ALP SERPL-CCNC: 61 U/L (ref 39–117)
ALT SERPL-CCNC: 15 U/L (ref 1–33)
APPEARANCE UR: CLEAR
AST SERPL-CCNC: 22 U/L (ref 1–32)
BACTERIA #/AREA URNS HPF: NORMAL /HPF
BASOPHILS # BLD AUTO: 0.05 10*3/MM3 (ref 0–0.2)
BASOPHILS NFR BLD AUTO: 1.2 % (ref 0–1.5)
BILIRUB SERPL-MCNC: 0.3 MG/DL (ref 0.2–1.2)
BILIRUB UR QL STRIP: NEGATIVE
BUN SERPL-MCNC: 10 MG/DL (ref 8–23)
BUN/CREAT SERPL: 12.3 (ref 7–25)
CALCIUM SERPL-MCNC: 9.7 MG/DL (ref 8.6–10.5)
CHLORIDE SERPL-SCNC: 99 MMOL/L (ref 98–107)
CHOLEST SERPL-MCNC: 289 MG/DL (ref 0–200)
CO2 SERPL-SCNC: 27.3 MMOL/L (ref 22–29)
COLOR UR: YELLOW
CREAT SERPL-MCNC: 0.81 MG/DL (ref 0.57–1)
EOSINOPHIL # BLD AUTO: 0.07 10*3/MM3 (ref 0–0.4)
EOSINOPHIL NFR BLD AUTO: 1.6 % (ref 0.3–6.2)
EPI CELLS #/AREA URNS HPF: NORMAL /HPF (ref 0–10)
ERYTHROCYTE [DISTWIDTH] IN BLOOD BY AUTOMATED COUNT: 12.7 % (ref 12.3–15.4)
GLOBULIN SER CALC-MCNC: 2.5 GM/DL
GLUCOSE SERPL-MCNC: 84 MG/DL (ref 65–99)
GLUCOSE UR QL: NEGATIVE
HCT VFR BLD AUTO: 43.8 % (ref 34–46.6)
HDLC SERPL-MCNC: 106 MG/DL (ref 40–60)
HGB BLD-MCNC: 15.2 G/DL (ref 12–15.9)
HGB UR QL STRIP: NEGATIVE
IMM GRANULOCYTES # BLD AUTO: 0 10*3/MM3 (ref 0–0.05)
IMM GRANULOCYTES NFR BLD AUTO: 0 % (ref 0–0.5)
KETONES UR QL STRIP: NEGATIVE
LDLC SERPL CALC-MCNC: 169 MG/DL (ref 0–100)
LEUKOCYTE ESTERASE UR QL STRIP: NEGATIVE
LYMPHOCYTES # BLD AUTO: 1.35 10*3/MM3 (ref 0.7–3.1)
LYMPHOCYTES NFR BLD AUTO: 31.8 % (ref 19.6–45.3)
MCH RBC QN AUTO: 31.6 PG (ref 26.6–33)
MCHC RBC AUTO-ENTMCNC: 34.7 G/DL (ref 31.5–35.7)
MCV RBC AUTO: 91.1 FL (ref 79–97)
MICRO URNS: NORMAL
MICRO URNS: NORMAL
MONOCYTES # BLD AUTO: 0.29 10*3/MM3 (ref 0.1–0.9)
MONOCYTES NFR BLD AUTO: 6.8 % (ref 5–12)
MUCOUS THREADS URNS QL MICRO: PRESENT /HPF
NEUTROPHILS # BLD AUTO: 2.49 10*3/MM3 (ref 1.7–7)
NEUTROPHILS NFR BLD AUTO: 58.6 % (ref 42.7–76)
NITRITE UR QL STRIP: NEGATIVE
NRBC BLD AUTO-RTO: 0 /100 WBC (ref 0–0.2)
PH UR STRIP: 7.5 [PH] (ref 5–7.5)
PLATELET # BLD AUTO: 208 10*3/MM3 (ref 140–450)
POTASSIUM SERPL-SCNC: 4.3 MMOL/L (ref 3.5–5.2)
PROT SERPL-MCNC: 7.1 G/DL (ref 6–8.5)
PROT UR QL STRIP: NEGATIVE
RBC # BLD AUTO: 4.81 10*6/MM3 (ref 3.77–5.28)
RBC #/AREA URNS HPF: NORMAL /HPF (ref 0–2)
SODIUM SERPL-SCNC: 138 MMOL/L (ref 136–145)
SP GR UR: 1.01 (ref 1–1.03)
TRIGL SERPL-MCNC: 68 MG/DL (ref 0–150)
TSH SERPL DL<=0.005 MIU/L-ACNC: 3.13 UIU/ML (ref 0.27–4.2)
URINALYSIS REFLEX: NORMAL
UROBILINOGEN UR STRIP-MCNC: 0.2 MG/DL (ref 0.2–1)
VLDLC SERPL CALC-MCNC: 13.6 MG/DL
WBC # BLD AUTO: 4.25 10*3/MM3 (ref 3.4–10.8)
WBC #/AREA URNS HPF: NORMAL /HPF (ref 0–5)

## 2020-01-16 ENCOUNTER — OFFICE VISIT (OUTPATIENT)
Dept: INTERNAL MEDICINE | Facility: CLINIC | Age: 80
End: 2020-01-16

## 2020-01-16 VITALS
OXYGEN SATURATION: 97 % | DIASTOLIC BLOOD PRESSURE: 66 MMHG | HEIGHT: 61 IN | HEART RATE: 69 BPM | BODY MASS INDEX: 24.92 KG/M2 | WEIGHT: 132 LBS | SYSTOLIC BLOOD PRESSURE: 136 MMHG

## 2020-01-16 DIAGNOSIS — M17.0 PRIMARY OSTEOARTHRITIS OF BOTH KNEES: ICD-10-CM

## 2020-01-16 DIAGNOSIS — E78.5 HYPERLIPIDEMIA, UNSPECIFIED HYPERLIPIDEMIA TYPE: ICD-10-CM

## 2020-01-16 DIAGNOSIS — I10 ESSENTIAL HYPERTENSION: ICD-10-CM

## 2020-01-16 DIAGNOSIS — Z00.00 HEALTHCARE MAINTENANCE: Primary | ICD-10-CM

## 2020-01-16 DIAGNOSIS — E03.9 HYPOTHYROIDISM, UNSPECIFIED TYPE: ICD-10-CM

## 2020-01-16 PROCEDURE — 90471 IMMUNIZATION ADMIN: CPT | Performed by: INTERNAL MEDICINE

## 2020-01-16 PROCEDURE — 90632 HEPA VACCINE ADULT IM: CPT | Performed by: INTERNAL MEDICINE

## 2020-01-16 PROCEDURE — 96160 PT-FOCUSED HLTH RISK ASSMT: CPT | Performed by: INTERNAL MEDICINE

## 2020-01-16 PROCEDURE — G0439 PPPS, SUBSEQ VISIT: HCPCS | Performed by: INTERNAL MEDICINE

## 2020-01-16 PROCEDURE — 99213 OFFICE O/P EST LOW 20 MIN: CPT | Performed by: INTERNAL MEDICINE

## 2020-01-16 PROCEDURE — 93000 ELECTROCARDIOGRAM COMPLETE: CPT | Performed by: INTERNAL MEDICINE

## 2020-01-16 RX ORDER — LEVOTHYROXINE SODIUM 75 MCG
75 TABLET ORAL DAILY
Qty: 90 TABLET | Refills: 1 | Status: SHIPPED | OUTPATIENT
Start: 2020-01-16 | End: 2020-01-27 | Stop reason: SDUPTHER

## 2020-01-16 NOTE — PROGRESS NOTES
"Josselyn Corrigan is a 79 y.o. female who presents for an annual wellness visit as well as check up of hypothyroid, hyperlipidemia, oa.        History of Present Illness   AWV, patient maintains routine hcm. She does maintain a healthy diet with routine walking. She has recently engaged in mindfulness training. She has reduced some of her stressors in the workforce as well. Patient has been euthyroid on 75 mcg levothyroxine with good energy. She tried off crestor and ldl is now >160. She feels well. She has chronic OA. She is following w/ ortho and walking routinely. No recent water activity.       Review of Systems   Constitutional: Negative.    HENT: Negative.    Eyes: Negative.         Annual eye exams   Respiratory: Negative.    Cardiovascular: Negative.    Gastrointestinal: Negative.    Endocrine: Negative.    Genitourinary: Negative.    Musculoskeletal: Positive for arthralgias.        Following w/ physical therapy w/ modest benefit.      Skin: Negative.         Routine skin exams. She notes some \"skin tags\" she is to get addressed.      Allergic/Immunologic: Negative.    Neurological: Negative.    Hematological: Negative.    Psychiatric/Behavioral: Negative.        The following portions of the patient's history were reviewed and updated as appropriate: allergies, current medications, past family history, past medical history, past social history, past surgical history and problem list.  Health maintenance tab was reviewed and updated with the patient.       Patient Active Problem List    Diagnosis Date Noted   • Essential hypertension 10/07/2019   • Depressed mood 04/07/2017   • Atopic rhinitis 06/29/2016   • Osteoarthritis 06/29/2016   • Hyperlipidemia 06/29/2016   • Hypothyroidism 06/29/2016   • Leukopenia 06/29/2016       Past Medical History:   Diagnosis Date   • Allergic    • Arthritis    • Cataract    • Dyspnea    • Headache    • Hyperlipidemia    • Hyperthyroidism    • Leukopenia    • " Osteoarthritis        Past Surgical History:   Procedure Laterality Date   • BRONCHOSCOPY N/A 12/13/2018    Procedure: BRONCHOSCOPY WITH;  Surgeon: Gee Kenney MD;  Location: [facility] ENDOSCOPY;  Service: Pulmonary   • NOSE SURGERY     • SUBMANDIBULAR GLAND EXCISION     • TONSILLECTOMY     • TUBAL ABDOMINAL LIGATION         Family History   Problem Relation Age of Onset   • Heart disease Father    • Heart disease Brother    • Heart disease Mother        Social History     Socioeconomic History   • Marital status:      Spouse name: Not on file   • Number of children: Not on file   • Years of education: Not on file   • Highest education level: Not on file   Tobacco Use   • Smoking status: Former Smoker   • Smokeless tobacco: Never Used   Substance and Sexual Activity   • Alcohol use: Yes     Alcohol/week: 1.0 standard drinks     Types: 1 Glasses of wine per week     Comment: daily   • Drug use: No   • Sexual activity: Defer       Current Outpatient Medications on File Prior to Visit   Medication Sig Dispense Refill   • montelukast (SINGULAIR) 10 MG tablet Take 1 tablet by mouth Every Night. 90 tablet 2   • rosuvastatin (CRESTOR) 5 MG tablet Take 1 tablet by mouth Daily. 90 tablet 3   • [DISCONTINUED] levothyroxine (SYNTHROID) 75 MCG tablet Take 1 tablet by mouth Daily. 90 tablet 3   • mupirocin (BACTROBAN) 2 % ointment Apply to affected area with each dressing change.  Change dressing QD or more often as needed. 22 g 0   • [DISCONTINUED] benzonatate (TESSALON PERLES) 100 MG capsule Take 1 capsule by mouth 3 (Three) Times a Day As Needed for Cough. 30 capsule 4   • [DISCONTINUED] fluticasone (FLONASE) 50 MCG/ACT nasal spray 2 sprays into the nostril(s) as directed by provider Daily. 1 bottle 5   • [DISCONTINUED] Saline (AYR NASAL MIST ALLERGY/SINUS) 2.65 % solution 1 spray into the nostril(s) as directed by provider 3 (Three) Times a Day. 50 mL 5   • [DISCONTINUED] VENTOLIN  (90 Base) MCG/ACT inhaler     "    Current Facility-Administered Medications on File Prior to Visit   Medication Dose Route Frequency Provider Last Rate Last Dose   • levalbuterol (XOPENEX) nebulizer solution 0.63 mg  0.63 mg Nebulization Q6H PRN Sola Gómez MD           No Known Allergies    Immunization History   Administered Date(s) Administered   • Fluad Quad 10/07/2019   • Fluzone High Dose =>65 Years ([employer] ONLY) 10/04/2016, 10/04/2017, 09/11/2018   • Hepatitis A 12/10/2018   • Pneumococcal Conjugate 13-Valent (PCV13) 10/04/2016   • Pneumococcal Polysaccharide (PPSV23) 02/01/2010   • TD Preservative Free 10/07/2019   • Tdap 10/29/2013   • Zostavax 02/01/2010       Objective     /66   Pulse 69   Ht 154.9 cm (61\")   Wt 59.9 kg (132 lb)   SpO2 97%   BMI 24.94 kg/m²     Physical Exam   Constitutional: She is oriented to person, place, and time. She appears well-developed and well-nourished.   HENT:   Head: Normocephalic and atraumatic.   Right Ear: External ear normal.   Left Ear: External ear normal.   Nose: Nose normal.   Mouth/Throat: Oropharynx is clear and moist.   Eyes: Pupils are equal, round, and reactive to light. Conjunctivae and EOM are normal.   Neck: Normal range of motion. Neck supple.   Cardiovascular: Normal rate, regular rhythm, normal heart sounds and intact distal pulses.   Pulmonary/Chest: Effort normal and breath sounds normal.   Abdominal: Soft. Bowel sounds are normal.   Genitourinary: Uterus normal. No vaginal discharge found.   Genitourinary Comments: Atrophic vaginal changes     Musculoskeletal: Normal range of motion.   Neurological: She is alert and oriented to person, place, and time.   Skin: Skin is warm and dry.   Psychiatric: She has a normal mood and affect. Her behavior is normal. Judgment and thought content normal.   Nursing note and vitals reviewed.    EKG for hypothyroidism. Sinus. No st changes. Unchanged from prior.     Assessment/Plan   Johana was seen today for annual exam.    Diagnoses " and all orders for this visit:    Healthcare maintenance    Hyperlipidemia, unspecified hyperlipidemia type  -     ECG 12 Lead    Hypothyroidism, unspecified type  -     TSH; Future  -     T4, Free; Future    Essential hypertension    Primary osteoarthritis of both knees    Other orders  -     SYNTHROID 75 MCG tablet; Take 1 tablet by mouth Daily.        Discussion    AWV.  See scanned forms and/or computer for maureen history, PHQ-9, functional ability questionnaire, cognitive impairment screening.  Direct observation of cognitive abilities:  normal These were all reviewed with the patient and the patient was provided with a personal prevention plan of service in patient instructions.  Patient was given advice or handouts on the following topics:  nutrition, fall prevention, exercise.   Patient has an advance directive - a copy has been provided and is visible in patient header.    I have recommended that the patient get the following immunizations:  hepatitis A.    Patient with dyslipidemia. Will restart crestor for this. She has hypothyroidism. Will switch to branded synthroid and monitor. May need dose adjustment. Will monitor levels and retest in 3 months. She will monitor bp. Advised to start water aerobics for OA. She will follow up routinely.       Health Maintenance   Topic Date Due   • ZOSTER VACCINE (2 of 3) 03/29/2010   • MEDICARE ANNUAL WELLNESS  12/10/2019   • LIPID PANEL  01/08/2021   • MAMMOGRAM  11/19/2021   • COLONOSCOPY  03/13/2025   • TDAP/TD VACCINES (3 - Td) 10/07/2029   • Pneumococcal Vaccine Once at 65 Years Old  Completed   • INFLUENZA VACCINE  Completed            No future appointments.

## 2020-01-21 ENCOUNTER — TRANSCRIBE ORDERS (OUTPATIENT)
Dept: PHYSICAL THERAPY | Facility: CLINIC | Age: 80
End: 2020-01-21

## 2020-01-21 DIAGNOSIS — M70.61 GREATER TROCHANTERIC BURSITIS OF RIGHT HIP: Primary | ICD-10-CM

## 2020-01-27 RX ORDER — LEVOTHYROXINE SODIUM 75 MCG
75 TABLET ORAL DAILY
Qty: 90 TABLET | Refills: 1 | Status: SHIPPED | OUTPATIENT
Start: 2020-01-27 | End: 2020-10-14

## 2020-01-27 RX ORDER — ROSUVASTATIN CALCIUM 5 MG/1
5 TABLET, COATED ORAL DAILY
Qty: 90 TABLET | Refills: 3 | Status: SHIPPED | OUTPATIENT
Start: 2020-01-27 | End: 2021-01-06 | Stop reason: SDUPTHER

## 2020-02-10 ENCOUNTER — OFFICE VISIT (OUTPATIENT)
Dept: INTERNAL MEDICINE | Facility: CLINIC | Age: 80
End: 2020-02-10

## 2020-02-10 VITALS
HEART RATE: 65 BPM | SYSTOLIC BLOOD PRESSURE: 160 MMHG | WEIGHT: 134 LBS | HEIGHT: 61 IN | BODY MASS INDEX: 25.3 KG/M2 | DIASTOLIC BLOOD PRESSURE: 74 MMHG

## 2020-02-10 DIAGNOSIS — R03.0 ELEVATED BP WITHOUT DIAGNOSIS OF HYPERTENSION: ICD-10-CM

## 2020-02-10 DIAGNOSIS — N39.0 URINARY TRACT INFECTION WITHOUT HEMATURIA, SITE UNSPECIFIED: Primary | ICD-10-CM

## 2020-02-10 LAB
BILIRUB BLD-MCNC: NEGATIVE MG/DL
CLARITY, POC: ABNORMAL
COLOR UR: YELLOW
GLUCOSE UR STRIP-MCNC: NEGATIVE MG/DL
KETONES UR QL: NEGATIVE
LEUKOCYTE EST, POC: ABNORMAL
NITRITE UR-MCNC: POSITIVE MG/ML
PH UR: 7.5 [PH] (ref 5–8)
PROT UR STRIP-MCNC: ABNORMAL MG/DL
RBC # UR STRIP: ABNORMAL /UL
SP GR UR: 1.02 (ref 1–1.03)
UROBILINOGEN UR QL: NORMAL

## 2020-02-10 PROCEDURE — 81003 URINALYSIS AUTO W/O SCOPE: CPT | Performed by: INTERNAL MEDICINE

## 2020-02-10 PROCEDURE — 99213 OFFICE O/P EST LOW 20 MIN: CPT | Performed by: INTERNAL MEDICINE

## 2020-02-10 RX ORDER — NITROFURANTOIN 25; 75 MG/1; MG/1
100 CAPSULE ORAL 2 TIMES DAILY
Qty: 14 CAPSULE | Refills: 0 | Status: SHIPPED | OUTPATIENT
Start: 2020-02-10 | End: 2020-03-16

## 2020-02-10 NOTE — PROGRESS NOTES
Chief Complaint   Patient presents with   • Nocturia   • Urinary Frequency       History of Present Illness   Johana Corrigan is a 79 y.o. female presents for acute needs. She has a new onset of urinary frequency and urgency. This she noted after riding in a car for many hours. She had gi upset and diarrhea. She has had 3 UTI in last one year. She did have this evaluated last year. She has had good hydration until today and she had a meeting and an eye procedure today. bp is now elevated. This is new for her.     The following portions of the patient's history were reviewed and updated as appropriate: allergies, current medications, past family history, past medical history, past social history, past surgical history and problem list.  Current Outpatient Medications on File Prior to Visit   Medication Sig Dispense Refill   • montelukast (SINGULAIR) 10 MG tablet Take 1 tablet by mouth Every Night. 90 tablet 2   • mupirocin (BACTROBAN) 2 % ointment Apply to affected area with each dressing change.  Change dressing QD or more often as needed. 22 g 0   • rosuvastatin (CRESTOR) 5 MG tablet Take 1 tablet by mouth Daily. 90 tablet 3   • SYNTHROID 75 MCG tablet Take 1 tablet by mouth Daily. 90 tablet 1     Current Facility-Administered Medications on File Prior to Visit   Medication Dose Route Frequency Provider Last Rate Last Dose   • levalbuterol (XOPENEX) nebulizer solution 0.63 mg  0.63 mg Nebulization Q6H PRN Sola Gómez MD         Review of Systems   Constitutional: Negative.    HENT: Negative.    Eyes: Negative.    Respiratory: Negative.    Cardiovascular: Negative.    Gastrointestinal: Negative.    Endocrine: Negative.    Genitourinary: Positive for frequency and urgency.   Musculoskeletal: Negative.    Skin: Negative.    Allergic/Immunologic: Negative.    Neurological: Negative.    Hematological: Negative.    Psychiatric/Behavioral: Negative.        Objective   Physical Exam   Constitutional: She is oriented to  "person, place, and time. She appears well-developed and well-nourished.   HENT:   Head: Normocephalic and atraumatic.   Right Ear: External ear normal.   Left Ear: External ear normal.   Mouth/Throat: Oropharynx is clear and moist.   Eyes: Pupils are equal, round, and reactive to light. Conjunctivae and EOM are normal.   Neck: Normal range of motion. Neck supple.   Cardiovascular: Normal rate, regular rhythm, normal heart sounds and intact distal pulses.   Pulmonary/Chest: Effort normal and breath sounds normal.   Abdominal: Soft. Bowel sounds are normal.   Musculoskeletal: Normal range of motion.   Neurological: She is alert and oriented to person, place, and time.   Skin: Skin is warm and dry.   Psychiatric: She has a normal mood and affect. Her behavior is normal. Judgment and thought content normal.   Nursing note and vitals reviewed.       /74   Pulse 65   Ht 154.9 cm (61\")   Wt 60.8 kg (134 lb)   BMI 25.32 kg/m²     Assessment/Plan   Diagnoses and all orders for this visit:    Urinary tract infection without hematuria, site unspecified  -     POC Urinalysis Dipstick, Automated  -     Urine Culture - Urine, Urine, Clean Catch    Elevated BP without diagnosis of hypertension    Other orders  -     nitrofurantoin, macrocrystal-monohydrate, (MACROBID) 100 MG capsule; Take 1 capsule by mouth 2 (Two) Times a Day.      Patient w/ acute urinary tract infection. Last culture and sensitivity reviewed and she was pansensitive at that time. As such will try macrobid bid for 7 days. Patient will have repeat testing w/ bp check in 1-2 weeks. bp elevation today thought to be related to her ocular procedure today. Low sodium diet.            "

## 2020-02-14 LAB
BACTERIA UR CULT: ABNORMAL
BACTERIA UR CULT: ABNORMAL
OTHER ANTIBIOTIC SUSC ISLT: ABNORMAL

## 2020-02-17 ENCOUNTER — TREATMENT (OUTPATIENT)
Dept: PHYSICAL THERAPY | Facility: CLINIC | Age: 80
End: 2020-02-17

## 2020-02-17 DIAGNOSIS — R29.898 LOSS OF MOVEMENT: ICD-10-CM

## 2020-02-17 DIAGNOSIS — R26.2 DIFFICULTY WALKING: ICD-10-CM

## 2020-02-17 DIAGNOSIS — Z78.9 DIFFICULTY NAVIGATING STAIRS: ICD-10-CM

## 2020-02-17 DIAGNOSIS — G47.9 DIFFICULTY SLEEPING: Primary | ICD-10-CM

## 2020-02-17 PROCEDURE — 97162 PT EVAL MOD COMPLEX 30 MIN: CPT | Performed by: PHYSICAL THERAPIST

## 2020-02-17 PROCEDURE — 97110 THERAPEUTIC EXERCISES: CPT | Performed by: PHYSICAL THERAPIST

## 2020-02-17 NOTE — PROGRESS NOTES
Physical Therapy Initial Evaluation and Plan of Care      Patient: Johana Corrigan   : 1940  Diagnosis/ICD-10 Code:  Difficulty sleeping [G47.9]  Referring practitioner: Khushi Craig MD  Date of Initial Visit: 2020  Today's Date: 2020  Patient seen for 1 sessions           Subjective: Johana reported that she began noticing pain in the central low back and R hip joint several months ago for no apparent reason.  She reports that she is able to continue walking despite pain, has intermittent difficulty with stairs and has a lot of problems sleeping comfortably.  After sitting for a long period of time she gets stiff.  She bought a new mattress which seems to have helped a little.  Recently pain has peaking at 7/10.  She experiences pain intermittently.  She reports no symptoms of parasthesia.    PMH:  Zero orthopedic surgeries, zero CVAs, zero MIs, hypothyroidism treated with medication  SH: , retired but is active with [employer] work, she enjoys travelling, seeing grandchildren, plays, girls basketball games    Objective   Gait:  She ambulated independently on level surface, without an assistive device, with a fairly normal gait pattern  Posture:  She has a mild scoliosis, with L lumbar concavity.  Her R shoulder, scapula and ilium are all lower than the L.  From a lateral view she has forward head, increased thoracic kyphosis and decreased lumbar lordosis.       Lumbar spine AROM, standing  Flexion minimal loss   Extension moderate loss  LB B moderate loss with pain, B, R greater than L    Hip AROM, standing  Flexion B WFL but R was painful  Hip Abduction: L minimal to moderate loss, R moderate loss with pain  Hip Extension: L minimal to moderate loss, R moderate loss with pain    DTRs:  Patellar B WNL/equal, Achilles B hypotonic/equal  Upper motor neuron: Babinski and Clonus were normal  Sensation: L1-S2 dermatomes were equal/intact B  Motor control: Hip flexion L 4+/5, R 4/5 with pain,  Knee extension B 5/5, Ankle dorsiflexion B 5/5, EHL B 4+/5, knee flexion L 4+/5, R 4-/5 with R knee pain and hip extension B 4+ to 5/5    Special tests  Inner quadrant of the hip: L normal, R abnormal  Compression/Loading of the lumbar spine: normal    Functional outcome score: PSFS  1. Ability to sleep: 7/10  2. Difficulty walkin/10  3. Difficulty ascending stairs: 5/10  Score: 17/30, 43%     Treatment:  Johana performed 5 repetitions of 3 part KTC from a hooklying position.  This was issued to her to begin her HEP.    Assessment:  Johana Corrigan is a 79 y.o. year-old female referred to physical therapy for chronic low back and LE pain. She presents with an evolving clinical presentation.  She has comorbidities to include soft and bony changes to the spinal area due to chronic poor posture.  She has no known personal factors  that may affect her progress in the plan of care.  Signs and symptoms are consistent with physical therapy diagnosis of chronic low back pain, chronic leg pain, poor posture, difficulty walking, difficulty navigating stairs and will need education for self care.    STGs to be met in 4 weeks  1. Johana is introduced to her core muscles and how to engage them in hooklying position.  2. Postural/spinal mobility exercises on the mat are progressed with KTC, LTR, craniovertebral flexion and wand flexion   3. PSFS improves by 7%.    LTGs to be met in 12 weeks  1. Johana is independent with a complete HEP and self care education  2. Motor control, L2-S2 myotomes, is equal and 4+/5 for all muscle groups tested.  3. PSFS improves by 15%.    Plan:  Johana Corrigan is to be treated with skilled PT for chronic low back pain and chronic leg pain.  Treatment may consist of manual therapy, therapeutic exercise, neuromuscular reeducation, aqua therapy, modalities to help with pain and function.  She will be educated to become independent with self care.  Treatment schedule 1-2 x per week.    Timed:  Manual  Therapy:         mins  90139;  Therapeutic Exercise:    10     mins  95254;     Neuromuscular Eduardo:        mins  92457;    Therapeutic Activity:          mins  99845;     Gait Training:           mins  47873;     Ultrasound:          mins  88716;    Electrical Stimulation:         mins  37776 ( );  Iontophoresis         mins 31156  Dry Needling        mins      Untimed:  Electrical Stimulation:         mins  74519 ( );  Mechanical Traction:         mins  59006;     Timed Treatment:   10   mins   Total Treatment:     45   mins    PT SIGNATURE: [facility] PT   DATE TREATMENT INITIATED: 2/17/2020    Initial Certification  Certification Period: 5/17/2020  I certify that the therapy services are furnished while this patient is under my care.  The services outlined above are required by this patient, and will be reviewed every 90 days.     PHYSICIAN: Khushi Craig MD      DATE:     Please sign and return via fax to  .. Thank you, [facility].

## 2020-02-19 ENCOUNTER — TREATMENT (OUTPATIENT)
Dept: PHYSICAL THERAPY | Facility: CLINIC | Age: 80
End: 2020-02-19

## 2020-02-19 DIAGNOSIS — R29.898 LOSS OF MOVEMENT: ICD-10-CM

## 2020-02-19 DIAGNOSIS — R26.2 DIFFICULTY WALKING: ICD-10-CM

## 2020-02-19 DIAGNOSIS — Z78.9 DIFFICULTY NAVIGATING STAIRS: ICD-10-CM

## 2020-02-19 DIAGNOSIS — G47.9 DIFFICULTY SLEEPING: Primary | ICD-10-CM

## 2020-02-19 PROCEDURE — 97140 MANUAL THERAPY 1/> REGIONS: CPT | Performed by: PHYSICAL THERAPIST

## 2020-02-19 PROCEDURE — 97110 THERAPEUTIC EXERCISES: CPT | Performed by: PHYSICAL THERAPIST

## 2020-02-19 NOTE — PROGRESS NOTES
"Physical Therapy Daily Progress Note    Patient: Johana Corrigan   : 1940  Diagnosis/ICD-10 Code:  Difficulty sleeping [G47.9]  Referring practitioner: Khushi Craig MD  Date of Initial Visit: Type: THERAPY  Noted: 2020  Today's Date: 2020  Patient seen for 2 sessions           Subjective: Johana reported that the home exercise given on her initial visit went well.     Objective     Therapeutic exercise  1. NuStep, x 5 minutes, WL of 4  2. Step up/through, 1\" step, x 10, B  3. Lateral step downs, 1\" step, x 10, B  4. Lumbar bracing, hooklying, roll at neck, 10s x 10  5. 3 part KTC, x 10, hooklying roll at neck  6. Wand flexion, hooklying, roll at neck, x 10, 5s hold      Manual therapy: In supine, legs on bolster, B leg pull traction and inner quadrant passive motion at each hip x 15.    Patient education: Lumbar bracing and wand flexion were added to her HEP.    Assessment: Johana required cues for exercise technique.  She noticed relief of stiffness following treatment.    Plan:  Progress spinal mobility and posture exercises next visit.             Timed:    Manual Therapy:    10     mins  08826;  Therapeutic Exercise:    30     mins  23430;     Neuromuscular Eduardo:    5   mins  39920;    Therapeutic Activity:          mins  21890;     Gait Training:           mins  03865;     Ultrasound:          mins  74007;    Electrical Stimulation:         mins  60629 ( );  Iontophoresis         mins 25221;  Aquatic Therapy         mins 25586;  Dry Needling                   mins    Untimed:  Electrical Stimulation:         mins  21763 ( );  Mechanical Traction:         mins  81677;     Timed Treatment:   45   mins   Total Treatment:     45   mins  [facility] Pepper PT  Physical Therapist  "

## 2020-02-21 ENCOUNTER — TELEPHONE (OUTPATIENT)
Dept: INTERNAL MEDICINE | Facility: CLINIC | Age: 80
End: 2020-02-21

## 2020-02-21 DIAGNOSIS — R35.0 URINE FREQUENCY: Primary | ICD-10-CM

## 2020-02-21 RX ORDER — LOSARTAN POTASSIUM 50 MG/1
50 TABLET ORAL DAILY
Qty: 90 TABLET | Refills: 3 | Status: SHIPPED | OUTPATIENT
Start: 2020-02-21 | End: 2020-08-11 | Stop reason: SDUPTHER

## 2020-02-21 NOTE — TELEPHONE ENCOUNTER
Pt in today for a bp check  148/70  Pt did purchase her own bp machine and will bring readings at next visit

## 2020-02-21 NOTE — TELEPHONE ENCOUNTER
A rx for losartan one tablet once daily has been sent to her pharmacy. She can have repeat bp testing in office in 2-3 weeks and monitor about 3 times weekly at home.

## 2020-02-22 LAB
APPEARANCE UR: CLEAR
BACTERIA #/AREA URNS HPF: ABNORMAL /HPF
BILIRUB UR QL STRIP: NEGATIVE
CASTS URNS MICRO: ABNORMAL
CASTS URNS QL MICRO: PRESENT /LPF
COLOR UR: YELLOW
CRYSTALS URNS MICRO: ABNORMAL
EPI CELLS #/AREA URNS HPF: ABNORMAL /HPF (ref 0–10)
GLUCOSE UR QL: NEGATIVE
HGB UR QL STRIP: NEGATIVE
KETONES UR QL STRIP: NEGATIVE
LEUKOCYTE ESTERASE UR QL STRIP: NEGATIVE
MICRO URNS: NORMAL
MICRO URNS: NORMAL
MUCOUS THREADS URNS QL MICRO: PRESENT /HPF
NITRITE UR QL STRIP: NEGATIVE
PH UR STRIP: 6 [PH] (ref 5–7.5)
PROT UR QL STRIP: NEGATIVE
RBC #/AREA URNS HPF: ABNORMAL /HPF (ref 0–2)
SP GR UR: 1.02 (ref 1–1.03)
UNIDENT CRYS URNS QL MICRO: PRESENT /LPF
URINALYSIS REFLEX: NORMAL
UROBILINOGEN UR STRIP-MCNC: 0.2 MG/DL (ref 0.2–1)
WBC #/AREA URNS HPF: ABNORMAL /HPF (ref 0–5)

## 2020-02-24 ENCOUNTER — TREATMENT (OUTPATIENT)
Dept: PHYSICAL THERAPY | Facility: CLINIC | Age: 80
End: 2020-02-24

## 2020-02-24 DIAGNOSIS — R26.2 DIFFICULTY WALKING: ICD-10-CM

## 2020-02-24 DIAGNOSIS — G47.9 DIFFICULTY SLEEPING: Primary | ICD-10-CM

## 2020-02-24 DIAGNOSIS — R29.898 LOSS OF MOVEMENT: ICD-10-CM

## 2020-02-24 DIAGNOSIS — Z78.9 DIFFICULTY NAVIGATING STAIRS: ICD-10-CM

## 2020-02-24 PROCEDURE — 97110 THERAPEUTIC EXERCISES: CPT | Performed by: PHYSICAL THERAPIST

## 2020-02-24 NOTE — PROGRESS NOTES
"Physical Therapy Daily Progress Note    Patient: Johana Corrigan   : 1940  Diagnosis/ICD-10 Code:  Difficulty sleeping [G47.9]  Referring practitioner: Khushi Craig MD  Date of Initial Visit: Type: THERAPY  Noted: 2020  Today's Date: 2020  Patient seen for 3 sessions           Subjective: \" I am sleeping much, much better.\"    Objective     Treatment  1. NuStep x 5 minutes  2. Standing shoulder extension, neutral , green theraband 10 x 2  3. Standing row, supinated , green theraband, 10 x 2  4. Step up/through, 2\" book x 10, B  5. Lateral step downs, 2\" book, x 10, B  6. 3 part knee to chest  7. Lumbar bridging, hooklying, 15s x 5  8. Wand flexion, hooklying, x 10    Patient education:  Green theraband issues for standing shoulder extension and scapular row were added to her HEP.  So was lying upper neck flexion.    Assessment:  Johana is responding well to treatment.  She requires verbal and tactile cues for exercise technique.    Plan: Monitor the effect of today's treatment and continue as indicated.      Timed:    Manual Therapy:         mins  69437;  Therapeutic Exercise:    40     mins  39743;     Neuromuscular Eduardo:    5    mins  43022;    Therapeutic Activity:          mins  36354;     Gait Training:           mins  43657;     Ultrasound:          mins  51261;    Electrical Stimulation:         mins  65554 ( );  Iontophoresis         mins 17791;  Aquatic Therapy         mins 89327;  Dry Needling                   mins    Untimed:  Electrical Stimulation:         mins  55931 ( );  Mechanical Traction:         mins  31564;     Timed Treatment:   45   mins   Total Treatment:     45   mins  [facility] Pepper PT  Physical Therapist  "

## 2020-02-26 ENCOUNTER — TREATMENT (OUTPATIENT)
Dept: PHYSICAL THERAPY | Facility: CLINIC | Age: 80
End: 2020-02-26

## 2020-02-26 DIAGNOSIS — Z78.9 DIFFICULTY NAVIGATING STAIRS: ICD-10-CM

## 2020-02-26 DIAGNOSIS — G47.9 DIFFICULTY SLEEPING: Primary | ICD-10-CM

## 2020-02-26 DIAGNOSIS — R26.2 DIFFICULTY WALKING: ICD-10-CM

## 2020-02-26 DIAGNOSIS — R29.898 LOSS OF MOVEMENT: ICD-10-CM

## 2020-02-26 PROCEDURE — 97110 THERAPEUTIC EXERCISES: CPT | Performed by: PHYSICAL THERAPIST

## 2020-02-26 PROCEDURE — 97112 NEUROMUSCULAR REEDUCATION: CPT | Performed by: PHYSICAL THERAPIST

## 2020-02-26 NOTE — PROGRESS NOTES
"Physical Therapy Daily Progress Note    Patient: Johana Corrigan   : 1940  Diagnosis/ICD-10 Code:  Difficulty sleeping [G47.9]  Referring practitioner: Khushi Craig MD  Date of Initial Visit: Type: THERAPY  Noted: 2020  Today's Date: 2020  Patient seen for 4 sessions           Subjective: Johana reported that she was more sore yesterday in the evening. She noticed zero pain after treatment.    Objective     Therapeutic exercise   1. NuStep x 5 minutes  2. Standing shoulder extension, neutral , green theraband 10 x 2  3. Standing row, supinated , green theraband, 10 x 2  4. Step up/through, 2\" book x 10, B  5. Lateral step downs, 2\" book, x 10, B  6. 3 part knee to chest  7. Lumbar bridging, hooklying, 15s x 5  8. Wand flexion, hooklying, x 10  9. Shoulder wall slides x 10  10. Modified piriformis stretch, 30s x 3, B  11. LTR, x 10, B    Patient education:  Shoulder wall slides and piriformis stretch were added to her HEP.    Assessment: Johana required demonstration and verbal cues for exercise performance today.  She tolerated treatment well and is progressing slowly with exercises to help prevent future exacerbations and improve posture.    Plan: monitor the effect of today's treatment and continue as indicated.       Timed:    Manual Therapy:         mins  47850;  Therapeutic Exercise:    35     mins  21843;     Neuromuscular Eduardo:    10    mins  18522;    Therapeutic Activity:          mins  86521;     Gait Training:           mins  16677;     Ultrasound:          mins  38800;    Electrical Stimulation:         mins  52826 ( );  Iontophoresis         mins 97233;  Aquatic Therapy         mins 47787;  Dry Needling                   mins    Untimed:  Electrical Stimulation:         mins  26630 ( );  Mechanical Traction:         mins  09912;     Timed Treatment:   45   mins   Total Treatment:     45   mins  [facility] PT  Physical Therapist  "

## 2020-03-16 ENCOUNTER — OFFICE VISIT (OUTPATIENT)
Dept: INTERNAL MEDICINE | Facility: CLINIC | Age: 80
End: 2020-03-16

## 2020-03-16 VITALS
WEIGHT: 132 LBS | TEMPERATURE: 97.8 F | BODY MASS INDEX: 24.92 KG/M2 | HEIGHT: 61 IN | SYSTOLIC BLOOD PRESSURE: 143 MMHG | HEART RATE: 71 BPM | DIASTOLIC BLOOD PRESSURE: 62 MMHG

## 2020-03-16 DIAGNOSIS — J01.10 ACUTE NON-RECURRENT FRONTAL SINUSITIS: Primary | ICD-10-CM

## 2020-03-16 DIAGNOSIS — I10 ESSENTIAL HYPERTENSION: ICD-10-CM

## 2020-03-16 PROCEDURE — 99213 OFFICE O/P EST LOW 20 MIN: CPT | Performed by: INTERNAL MEDICINE

## 2020-03-16 RX ORDER — TRIAMCINOLONE ACETONIDE 55 UG/1
2 SPRAY, METERED NASAL DAILY
Qty: 16.5 G | Refills: 11 | Status: SHIPPED | OUTPATIENT
Start: 2020-03-16 | End: 2020-04-07 | Stop reason: SDUPTHER

## 2020-03-16 RX ORDER — PREDNISONE 10 MG/1
10 TABLET ORAL 3 TIMES DAILY
Qty: 12 TABLET | Refills: 0 | Status: SHIPPED | OUTPATIENT
Start: 2020-03-16 | End: 2020-06-26

## 2020-03-16 RX ORDER — SACCHAROMYCES BOULARDII 250 MG
250 CAPSULE ORAL 2 TIMES DAILY
Qty: 20 CAPSULE | Refills: 3 | Status: SHIPPED | OUTPATIENT
Start: 2020-03-16 | End: 2020-05-04 | Stop reason: SDUPTHER

## 2020-03-16 RX ORDER — AMOXICILLIN 875 MG/1
875 TABLET, COATED ORAL 2 TIMES DAILY
Qty: 20 TABLET | Refills: 0 | Status: SHIPPED | OUTPATIENT
Start: 2020-03-16 | End: 2020-05-04

## 2020-03-16 NOTE — PROGRESS NOTES
"Chief Complaint   Patient presents with   • Sinus Problem   • Hypertension       History of Present Illness   Johana Corrigan is a 79 y.o. female presents for acute care. She reports some sinus congestion and headache today. Symptoms started last week w/ increasing in nature from that time.   Post nasal drainage. Notes \"a lot of yellow stuff\" when she brushes her teeth.   The following portions of the patient's history were reviewed and updated as appropriate: allergies, current medications, past family history, past medical history, past social history, past surgical history and problem list.  Current Outpatient Medications on File Prior to Visit   Medication Sig Dispense Refill   • losartan (COZAAR) 50 MG tablet Take 1 tablet by mouth Daily. 90 tablet 3   • montelukast (SINGULAIR) 10 MG tablet Take 1 tablet by mouth Every Night. 90 tablet 2   • mupirocin (BACTROBAN) 2 % ointment Apply to affected area with each dressing change.  Change dressing QD or more often as needed. 22 g 0   • rosuvastatin (CRESTOR) 5 MG tablet Take 1 tablet by mouth Daily. 90 tablet 3   • SYNTHROID 75 MCG tablet Take 1 tablet by mouth Daily. 90 tablet 1   • [DISCONTINUED] nitrofurantoin, macrocrystal-monohydrate, (MACROBID) 100 MG capsule Take 1 capsule by mouth 2 (Two) Times a Day. 14 capsule 0     Current Facility-Administered Medications on File Prior to Visit   Medication Dose Route Frequency Provider Last Rate Last Dose   • levalbuterol (XOPENEX) nebulizer solution 0.63 mg  0.63 mg Nebulization Q6H PRN Sola Gómez MD         Review of Systems   Constitutional: Positive for fatigue. Negative for fever.   HENT: Positive for rhinorrhea, sinus pressure, sinus pain and sore throat.    Eyes: Negative.    Respiratory: Negative for cough and shortness of breath.    Cardiovascular: Negative.    Gastrointestinal: Negative.    Endocrine: Negative.    Genitourinary: Negative.    Musculoskeletal: Negative.    Skin: Negative.  " "  Allergic/Immunologic: Negative.    Neurological: Positive for headaches.   Hematological: Negative.    Psychiatric/Behavioral: Negative.        Objective   Physical Exam   Constitutional: She is oriented to person, place, and time. She appears well-developed and well-nourished.   HENT:   Head: Normocephalic and atraumatic.   Right Ear: External ear normal.   Left Ear: External ear normal.   Pharyngeal erythema  Pressure/ discomfort max and frontal sinus   Eyes: Pupils are equal, round, and reactive to light. Conjunctivae and EOM are normal.   Neck: Normal range of motion.   Cardiovascular: Normal rate, regular rhythm and normal heart sounds.   Pulmonary/Chest: Effort normal and breath sounds normal.   Abdominal: Soft. Bowel sounds are normal.   Musculoskeletal: Normal range of motion.   Neurological: She is alert and oriented to person, place, and time.   Skin: Skin is warm and dry.   Psychiatric: She has a normal mood and affect. Her behavior is normal. Judgment and thought content normal.   Nursing note and vitals reviewed.       /62   Pulse 71   Temp 97.8 °F (36.6 °C)   Ht 154.9 cm (61\")   Wt 59.9 kg (132 lb)   BMI 24.94 kg/m²     Assessment/Plan   Diagnoses and all orders for this visit:    Acute non-recurrent frontal sinusitis    Essential hypertension    Other orders  -     amoxicillin (AMOXIL) 875 MG tablet; Take 1 tablet by mouth 2 (Two) Times a Day.  -     predniSONE (DELTASONE) 10 MG tablet; Take 1 tablet by mouth 3 (Three) Times a Day.  -     Triamcinolone Acetonide (NASACORT) 55 MCG/ACT nasal inhaler; 2 sprays into the nostril(s) as directed by provider Daily.  -     saccharomyces boulardii (FLORASTOR) 250 MG capsule; Take 1 capsule by mouth 2 (Two) Times a Day.      Patient w/ acute sinusitis. Will start amoxil 875 mg bid. nasacort qd. Florastor. Prednisone tid and nasal saline rinse. She will follow up as needed. She will monitor bp and continued to advise a low sodium diet. She will " monitor bp.

## 2020-03-27 ENCOUNTER — TELEPHONE (OUTPATIENT)
Dept: INTERNAL MEDICINE | Facility: CLINIC | Age: 80
End: 2020-03-27

## 2020-03-27 RX ORDER — METHYLPREDNISOLONE 4 MG/1
TABLET ORAL
Qty: 21 EACH | Refills: 0 | Status: SHIPPED | OUTPATIENT
Start: 2020-03-27 | End: 2020-06-26

## 2020-03-27 RX ORDER — CETIRIZINE HYDROCHLORIDE 10 MG/1
10 TABLET ORAL DAILY
Qty: 90 TABLET | Refills: 1 | Status: SHIPPED | OUTPATIENT
Start: 2020-03-27 | End: 2021-11-03

## 2020-03-27 NOTE — TELEPHONE ENCOUNTER
rx sent for a medrol dose pack. This will reduce nasal inflammation and help relieve the headache. She should continue nasacort 2 spray each nostril as well as a daily antihistamine (zyrtec sent to pharmacy).

## 2020-03-27 NOTE — TELEPHONE ENCOUNTER
Pt called and stated she is done with the medications and is doing much better with no fever. But she is having the headaches still and a lot of drainage. She is wanting to know if you can send in another round of antibiotics to help with the drainage.

## 2020-04-07 ENCOUNTER — TELEMEDICINE (OUTPATIENT)
Dept: INTERNAL MEDICINE | Facility: CLINIC | Age: 80
End: 2020-04-07

## 2020-04-07 VITALS — SYSTOLIC BLOOD PRESSURE: 114 MMHG | DIASTOLIC BLOOD PRESSURE: 69 MMHG

## 2020-04-07 DIAGNOSIS — I10 ESSENTIAL HYPERTENSION: ICD-10-CM

## 2020-04-07 DIAGNOSIS — J30.2 SEASONAL ALLERGIC RHINITIS, UNSPECIFIED TRIGGER: Primary | ICD-10-CM

## 2020-04-07 PROCEDURE — 99213 OFFICE O/P EST LOW 20 MIN: CPT | Performed by: INTERNAL MEDICINE

## 2020-04-07 RX ORDER — AZELASTINE 1 MG/ML
2 SPRAY, METERED NASAL 2 TIMES DAILY
Qty: 1 EACH | Refills: 12 | Status: SHIPPED | OUTPATIENT
Start: 2020-04-07 | End: 2020-09-11 | Stop reason: SDUPTHER

## 2020-04-07 RX ORDER — TRIAMCINOLONE ACETONIDE 55 UG/1
2 SPRAY, METERED NASAL DAILY
Qty: 16.5 G | Refills: 11 | Status: SHIPPED | OUTPATIENT
Start: 2020-04-07 | End: 2020-11-06 | Stop reason: SDUPTHER

## 2020-04-07 NOTE — PROGRESS NOTES
Chief Complaint   Patient presents with   • Allergic Rhinitis   • Sinus Problem   • Hypertension       History of Present Illness   Johana Corrigan is a 79 y.o. female presents for acute needs. She has continued sinus drainage. She has completed amoxil and a medrol dose pack. She no longer is having a headache or sinus pain/ pressure. Most pronounced at this time is sinus drainage going down the back of her throat. She notes that nasacort is beneficial but she has almost completed her month supply of this.   She has hypertension and notes that bp has been normotensive with losartan. bp 114/69 today. Ranging 110s-130s/ 60s-70s.   The following portions of the patient's history were reviewed and updated as appropriate: allergies, current medications, past family history, past medical history, past social history, past surgical history and problem list.  Current Outpatient Medications on File Prior to Visit   Medication Sig Dispense Refill   • amoxicillin (AMOXIL) 875 MG tablet Take 1 tablet by mouth 2 (Two) Times a Day. 20 tablet 0   • cetirizine (zyrTEC) 10 MG tablet Take 1 tablet by mouth Daily. 90 tablet 1   • losartan (COZAAR) 50 MG tablet Take 1 tablet by mouth Daily. 90 tablet 3   • methylPREDNISolone (MEDROL, BOGDAN,) 4 MG tablet Take as directed on package instructions. 21 each 0   • montelukast (SINGULAIR) 10 MG tablet Take 1 tablet by mouth Every Night. 90 tablet 2   • mupirocin (BACTROBAN) 2 % ointment Apply to affected area with each dressing change.  Change dressing QD or more often as needed. 22 g 0   • predniSONE (DELTASONE) 10 MG tablet Take 1 tablet by mouth 3 (Three) Times a Day. 12 tablet 0   • rosuvastatin (CRESTOR) 5 MG tablet Take 1 tablet by mouth Daily. 90 tablet 3   • saccharomyces boulardii (FLORASTOR) 250 MG capsule Take 1 capsule by mouth 2 (Two) Times a Day. 20 capsule 3   • SYNTHROID 75 MCG tablet Take 1 tablet by mouth Daily. 90 tablet 1   • [DISCONTINUED] Triamcinolone Acetonide (NASACORT)  55 MCG/ACT nasal inhaler 2 sprays into the nostril(s) as directed by provider Daily. 16.5 g 11     Current Facility-Administered Medications on File Prior to Visit   Medication Dose Route Frequency Provider Last Rate Last Dose   • levalbuterol (XOPENEX) nebulizer solution 0.63 mg  0.63 mg Nebulization Q6H PRN Sola Gómez MD         Review of Systems   Constitutional: Negative.  Negative for fever.   HENT: Positive for postnasal drip, rhinorrhea, sinus pressure and sore throat.    Eyes: Negative.    Respiratory: Negative for cough.    Cardiovascular: Negative.    Gastrointestinal: Negative.    Endocrine: Negative.    Genitourinary: Negative.    Musculoskeletal: Negative.    Skin: Negative.    Neurological: Negative for headaches.   Hematological: Negative.    Psychiatric/Behavioral: Negative.        Objective   Physical Exam   Constitutional: She is oriented to person, place, and time.   No distress  Alert and pleasant     Pulmonary/Chest: Effort normal.   Neurological: She is alert and oriented to person, place, and time.   Psychiatric: She has a normal mood and affect. Her behavior is normal. Judgment and thought content normal.        /69     Assessment/Plan   Diagnoses and all orders for this visit:    Seasonal allergic rhinitis, unspecified trigger    Essential hypertension    Other orders  -     azelastine (ASTELIN) 0.1 % nasal spray; 2 sprays into the nostril(s) as directed by provider 2 (Two) Times a Day. Use in each nostril as directed  -     Triamcinolone Acetonide (NASACORT) 55 MCG/ACT nasal inhaler; 2 sprays into the nostril(s) as directed by provider Daily.    patient w/ persistent sinus drainage. Resolution of sinus pain and headache. Suspect allergic rhinitis as continued culprit. She will continue daily nasacort and add astelin initially bid then reduce to qd. She may also take a daily zyrtec w/ prn tyl cold and sinus (but monitor bp while taking phenylephrine). Would avoid sudaphed. She  will continue to monitor bp and continue current meds for this. Total visit 25 min. intially video then converted to phone related to connectivity issues.

## 2020-04-16 ENCOUNTER — RESULTS ENCOUNTER (OUTPATIENT)
Dept: INTERNAL MEDICINE | Facility: CLINIC | Age: 80
End: 2020-04-16

## 2020-04-16 DIAGNOSIS — E03.9 HYPOTHYROIDISM, UNSPECIFIED TYPE: ICD-10-CM

## 2020-05-04 ENCOUNTER — OFFICE VISIT (OUTPATIENT)
Dept: INTERNAL MEDICINE | Facility: CLINIC | Age: 80
End: 2020-05-04

## 2020-05-04 VITALS
SYSTOLIC BLOOD PRESSURE: 136 MMHG | RESPIRATION RATE: 18 BRPM | BODY MASS INDEX: 25.86 KG/M2 | TEMPERATURE: 97.1 F | WEIGHT: 137 LBS | DIASTOLIC BLOOD PRESSURE: 69 MMHG | HEIGHT: 61 IN | HEART RATE: 76 BPM

## 2020-05-04 DIAGNOSIS — R35.0 URINE FREQUENCY: Primary | ICD-10-CM

## 2020-05-04 DIAGNOSIS — N39.0 URINARY TRACT INFECTION WITHOUT HEMATURIA, SITE UNSPECIFIED: ICD-10-CM

## 2020-05-04 LAB
BILIRUB BLD-MCNC: NEGATIVE MG/DL
CLARITY, POC: ABNORMAL
COLOR UR: ABNORMAL
GLUCOSE UR STRIP-MCNC: NEGATIVE MG/DL
KETONES UR QL: NEGATIVE
LEUKOCYTE EST, POC: ABNORMAL
NITRITE UR-MCNC: NEGATIVE MG/ML
PH UR: 6.5 [PH] (ref 5–8)
PROT UR STRIP-MCNC: ABNORMAL MG/DL
RBC # UR STRIP: ABNORMAL /UL
SP GR UR: 1.01 (ref 1–1.03)
UROBILINOGEN UR QL: NORMAL

## 2020-05-04 PROCEDURE — 99213 OFFICE O/P EST LOW 20 MIN: CPT | Performed by: INTERNAL MEDICINE

## 2020-05-04 PROCEDURE — 81003 URINALYSIS AUTO W/O SCOPE: CPT | Performed by: INTERNAL MEDICINE

## 2020-05-04 RX ORDER — CEPHALEXIN 500 MG/1
500 CAPSULE ORAL 2 TIMES DAILY
Qty: 10 CAPSULE | Refills: 0 | Status: SHIPPED | OUTPATIENT
Start: 2020-05-04 | End: 2020-06-26

## 2020-05-04 RX ORDER — SACCHAROMYCES BOULARDII 250 MG
250 CAPSULE ORAL 2 TIMES DAILY
Qty: 20 CAPSULE | Refills: 3 | Status: SHIPPED | OUTPATIENT
Start: 2020-05-04 | End: 2020-06-26

## 2020-05-04 NOTE — PROGRESS NOTES
"Chief Complaint   Patient presents with   • Urinary Tract Infection     x 2 days    • Allergic Rhinitis       History of Present Illness   Johana Corrigan is a 79 y.o. female presents for acute needs. Having increased urination recently. Frequency and urgency. She notes that she has UTI when she is seated for prolonged periods. This used to be when she would travel but with COVID-19 pandemic she has been more sedentary that she usually would be. Previous infection was pansensitive. She had normal ct abd pelvis in the past.   Secondary issue of increased \"phlegm\". This occurs seasonally. She is using astelin and flonase daily but has not recently used singulair or zyrtec.     The following portions of the patient's history were reviewed and updated as appropriate: allergies, current medications, past family history, past medical history, past social history, past surgical history and problem list.  Current Outpatient Medications on File Prior to Visit   Medication Sig Dispense Refill   • azelastine (ASTELIN) 0.1 % nasal spray 2 sprays into the nostril(s) as directed by provider 2 (Two) Times a Day. Use in each nostril as directed 1 each 12   • cetirizine (zyrTEC) 10 MG tablet Take 1 tablet by mouth Daily. 90 tablet 1   • losartan (COZAAR) 50 MG tablet Take 1 tablet by mouth Daily. 90 tablet 3   • methylPREDNISolone (MEDROL, BOGDAN,) 4 MG tablet Take as directed on package instructions. 21 each 0   • montelukast (SINGULAIR) 10 MG tablet Take 1 tablet by mouth Every Night. 90 tablet 2   • mupirocin (BACTROBAN) 2 % ointment Apply to affected area with each dressing change.  Change dressing QD or more often as needed. 22 g 0   • predniSONE (DELTASONE) 10 MG tablet Take 1 tablet by mouth 3 (Three) Times a Day. 12 tablet 0   • rosuvastatin (CRESTOR) 5 MG tablet Take 1 tablet by mouth Daily. 90 tablet 3   • SYNTHROID 75 MCG tablet Take 1 tablet by mouth Daily. 90 tablet 1   • Triamcinolone Acetonide (NASACORT) 55 MCG/ACT nasal " "inhaler 2 sprays into the nostril(s) as directed by provider Daily. 16.5 g 11   • [DISCONTINUED] amoxicillin (AMOXIL) 875 MG tablet Take 1 tablet by mouth 2 (Two) Times a Day. 20 tablet 0   • [DISCONTINUED] saccharomyces boulardii (FLORASTOR) 250 MG capsule Take 1 capsule by mouth 2 (Two) Times a Day. 20 capsule 3     Current Facility-Administered Medications on File Prior to Visit   Medication Dose Route Frequency Provider Last Rate Last Dose   • levalbuterol (XOPENEX) nebulizer solution 0.63 mg  0.63 mg Nebulization Q6H PRN Sola Gómez MD         Review of Systems   Constitutional: Negative.    HENT: Positive for postnasal drip and rhinorrhea.    Eyes: Negative.    Respiratory: Negative for cough and shortness of breath.    Cardiovascular: Negative.    Gastrointestinal: Negative.    Endocrine: Negative.    Genitourinary: Positive for frequency and urgency.   Musculoskeletal: Negative.    Skin: Negative.    Allergic/Immunologic: Negative.    Neurological: Negative.    Hematological: Negative.    Psychiatric/Behavioral: Negative.        Objective   Physical Exam   Constitutional: She is oriented to person, place, and time. She appears well-developed and well-nourished.   HENT:   Head: Normocephalic and atraumatic.   Eyes: Pupils are equal, round, and reactive to light. EOM are normal.   Neck: Normal range of motion. Neck supple.   Cardiovascular: Normal rate and regular rhythm.   Pulmonary/Chest: Effort normal.   Musculoskeletal: Normal range of motion.   Neurological: She is alert and oriented to person, place, and time.   Psychiatric: She has a normal mood and affect. Her behavior is normal. Judgment and thought content normal.        /69   Pulse 76   Temp 97.1 °F (36.2 °C) (Temporal)   Resp 18   Ht 154.9 cm (61\")   Wt 62.1 kg (137 lb)   BMI 25.89 kg/m²     Assessment/Plan   Diagnoses and all orders for this visit:    Urine frequency  -     POC Urinalysis Dipstick, Automated    Urinary tract " infection without hematuria, site unspecified    Other orders  -     cephalexin (Keflex) 500 MG capsule; Take 1 capsule by mouth 2 (Two) Times a Day.  -     saccharomyces boulardii (Florastor) 250 MG capsule; Take 1 capsule by mouth 2 (Two) Times a Day.        Patient w/ acute uti. She has a neg ct abd pelvis in the past. Last uti w/ pansensitive organism. Will treat empyrically w/ keflex given some uri symptoms at this time as well. will send urine for culture and sensitivity as well. She has allergic rhinitis and will continue flonase and astelin nasal preparations for this but will add daily zyrtec. She declines need for pyridium at this time. Have emphasized need for increased hydration w/ water and she will continue diet cranberry. F/u prn.

## 2020-06-26 ENCOUNTER — TELEPHONE (OUTPATIENT)
Dept: INTERNAL MEDICINE | Facility: CLINIC | Age: 80
End: 2020-06-26

## 2020-06-26 NOTE — TELEPHONE ENCOUNTER
Patient called in wanting medical advice, she says that her mouth recently started tasting soapy. She wanted to know if it would be ok to stop all her medication except Synthroid.    Please call back to keshav @ [phone]

## 2020-07-01 ENCOUNTER — OFFICE VISIT (OUTPATIENT)
Dept: INTERNAL MEDICINE | Facility: CLINIC | Age: 80
End: 2020-07-01

## 2020-07-01 VITALS
BODY MASS INDEX: 25.49 KG/M2 | OXYGEN SATURATION: 98 % | TEMPERATURE: 97.7 F | HEART RATE: 65 BPM | WEIGHT: 135 LBS | DIASTOLIC BLOOD PRESSURE: 70 MMHG | HEIGHT: 61 IN | SYSTOLIC BLOOD PRESSURE: 128 MMHG

## 2020-07-01 DIAGNOSIS — I10 ESSENTIAL HYPERTENSION: ICD-10-CM

## 2020-07-01 DIAGNOSIS — E78.5 HYPERLIPIDEMIA, UNSPECIFIED HYPERLIPIDEMIA TYPE: ICD-10-CM

## 2020-07-01 DIAGNOSIS — R43.2 ABNORMAL TASTE IN MOUTH: Primary | ICD-10-CM

## 2020-07-01 DIAGNOSIS — E03.9 HYPOTHYROIDISM, UNSPECIFIED TYPE: ICD-10-CM

## 2020-07-01 LAB
ALBUMIN SERPL-MCNC: 4.6 G/DL (ref 3.5–5.2)
ALBUMIN/GLOB SERPL: 2.3 G/DL
ALP SERPL-CCNC: 54 U/L (ref 39–117)
ALT SERPL-CCNC: 19 U/L (ref 1–33)
AST SERPL-CCNC: 19 U/L (ref 1–32)
BILIRUB SERPL-MCNC: 0.3 MG/DL (ref 0.2–1.2)
BUN SERPL-MCNC: 15 MG/DL (ref 8–23)
BUN/CREAT SERPL: 22.1 (ref 7–25)
CALCIUM SERPL-MCNC: 9.5 MG/DL (ref 8.6–10.5)
CHLORIDE SERPL-SCNC: 100 MMOL/L (ref 98–107)
CHOLEST SERPL-MCNC: 181 MG/DL (ref 0–200)
CO2 SERPL-SCNC: 24.2 MMOL/L (ref 22–29)
CREAT SERPL-MCNC: 0.68 MG/DL (ref 0.57–1)
GLOBULIN SER CALC-MCNC: 2 GM/DL
GLUCOSE SERPL-MCNC: 95 MG/DL (ref 65–99)
HDLC SERPL-MCNC: 93 MG/DL (ref 40–60)
LDLC SERPL CALC-MCNC: 79 MG/DL (ref 0–100)
LDLC/HDLC SERPL: 0.85 {RATIO}
POTASSIUM SERPL-SCNC: 4.4 MMOL/L (ref 3.5–5.2)
PROT SERPL-MCNC: 6.6 G/DL (ref 6–8.5)
SODIUM SERPL-SCNC: 134 MMOL/L (ref 136–145)
T4 FREE SERPL-MCNC: 1.6 NG/DL (ref 0.93–1.7)
TRIGL SERPL-MCNC: 45 MG/DL (ref 0–150)
TSH SERPL DL<=0.005 MIU/L-ACNC: 1.59 UIU/ML (ref 0.27–4.2)
VLDLC SERPL CALC-MCNC: 9 MG/DL

## 2020-07-01 PROCEDURE — 99214 OFFICE O/P EST MOD 30 MIN: CPT | Performed by: INTERNAL MEDICINE

## 2020-07-01 NOTE — PROGRESS NOTES
"Chief Complaint   Patient presents with   • soapy taste   • Hypertension   • Hyperlipidemia   • Hypothyroidism       History of Present Illness   Johana Corrigan is a 79 y.o. female presents for acute care. Reports she has a sensation of a \"soapy taste\" in her mouth. She stopped biotin and mvi and had modest improvement. She has not taken singulair recently.   She has two nasal spray at this time. She does having increased sinus drainage. She is concerned about taste changes being related to the current virus.   Ptient has hyperlipidemia. Elevated levels when tested in January and due to retest. She has hypothyroidism and tsh was 3.5 at last test.   bp has been normotensive. Previous challenges w/ leg swelling and this has resolved.     The following portions of the patient's history were reviewed and updated as appropriate: allergies, current medications, past family history, past medical history, past social history, past surgical history and problem list.  Current Outpatient Medications on File Prior to Visit   Medication Sig Dispense Refill   • losartan (COZAAR) 50 MG tablet Take 1 tablet by mouth Daily. 90 tablet 3   • rosuvastatin (CRESTOR) 5 MG tablet Take 1 tablet by mouth Daily. 90 tablet 3   • SYNTHROID 75 MCG tablet Take 1 tablet by mouth Daily. 90 tablet 1   • azelastine (ASTELIN) 0.1 % nasal spray 2 sprays into the nostril(s) as directed by provider 2 (Two) Times a Day. Use in each nostril as directed 1 each 12   • cetirizine (zyrTEC) 10 MG tablet Take 1 tablet by mouth Daily. 90 tablet 1   • mupirocin (BACTROBAN) 2 % ointment Apply to affected area with each dressing change.  Change dressing QD or more often as needed. 22 g 0   • Triamcinolone Acetonide (NASACORT) 55 MCG/ACT nasal inhaler 2 sprays into the nostril(s) as directed by provider Daily. 16.5 g 11     Current Facility-Administered Medications on File Prior to Visit   Medication Dose Route Frequency Provider Last Rate Last Dose   • levalbuterol " "(XOPENEX) nebulizer solution 0.63 mg  0.63 mg Nebulization Q6H PRN Sola Gómez MD         Review of Systems   Constitutional: Negative.    HENT: Positive for postnasal drip and rhinorrhea.    Eyes: Negative.    Respiratory: Negative.    Cardiovascular: Negative.    Gastrointestinal: Negative.    Endocrine: Negative.    Genitourinary: Negative.    Musculoskeletal: Negative.    Skin: Negative.    Allergic/Immunologic: Negative.    Neurological: Negative.    Hematological: Negative.    Psychiatric/Behavioral: Negative.        Objective   Physical Exam   Constitutional: She appears well-developed and well-nourished.   HENT:   Head: Normocephalic and atraumatic.   Right Ear: External ear normal.   Left Ear: External ear normal.   Cardiovascular: Normal rate, regular rhythm and normal heart sounds.   Nursing note and vitals reviewed.       /70   Pulse 65   Temp 97.7 °F (36.5 °C)   Ht 154.9 cm (61\")   Wt 61.2 kg (135 lb)   SpO2 98%   BMI 25.51 kg/m²     Assessment/Plan   Diagnoses and all orders for this visit:    Abnormal taste in mouth    Hypothyroidism, unspecified type  -     Comprehensive Metabolic Panel  -     Lipid Panel With LDL / HDL Ratio  -     TSH  -     T4, Free    Hyperlipidemia, unspecified hyperlipidemia type  -     Comprehensive Metabolic Panel  -     Lipid Panel With LDL / HDL Ratio  -     TSH  -     T4, Free    Essential hypertension  -     Comprehensive Metabolic Panel  -     Lipid Panel With LDL / HDL Ratio  -     TSH  -     T4, Free    patient w/ an abnormal taste in her mouth. Suspect this is related to one of her nasal preparations. She will see if she recently had a new generic . She will hold astelin for now and may continue nasonex and then may try the other. Will continue singulair and zyrtec given continued nasal congestion. She will have listed labs. She will follow up routinely.              "

## 2020-07-06 NOTE — PROGRESS NOTES
Sola Gómez MD > Marshal Thomas MA      Please advise patient that all of her lab results are NORMAL    >>PATIENT NOTIFIED

## 2020-08-11 RX ORDER — LOSARTAN POTASSIUM 50 MG/1
50 TABLET ORAL DAILY
Qty: 90 TABLET | Refills: 3 | Status: SHIPPED | OUTPATIENT
Start: 2020-08-11 | End: 2021-05-20 | Stop reason: SDUPTHER

## 2020-08-12 ENCOUNTER — TELEPHONE (OUTPATIENT)
Dept: INTERNAL MEDICINE | Facility: CLINIC | Age: 80
End: 2020-08-12

## 2020-08-12 NOTE — TELEPHONE ENCOUNTER
Calling [facility] while travelling.   The patient was seen there on 08/12/2019 for post nasal drip.  She has a HMO and they are asking if we can do a referral and send to [facility] to try and get this paid    [phone] Jennifer in the billing

## 2020-09-10 ENCOUNTER — TELEPHONE (OUTPATIENT)
Dept: INTERNAL MEDICINE | Facility: CLINIC | Age: 80
End: 2020-09-10

## 2020-09-11 RX ORDER — AZELASTINE 1 MG/ML
2 SPRAY, METERED NASAL 2 TIMES DAILY
Qty: 1 EACH | Refills: 12 | Status: SHIPPED | OUTPATIENT
Start: 2020-09-11

## 2020-09-14 ENCOUNTER — FLU SHOT (OUTPATIENT)
Dept: INTERNAL MEDICINE | Facility: CLINIC | Age: 80
End: 2020-09-14

## 2020-09-14 DIAGNOSIS — Z23 NEED FOR VACCINATION: Primary | ICD-10-CM

## 2020-09-14 PROCEDURE — 90653 IIV ADJUVANT VACCINE IM: CPT | Performed by: INTERNAL MEDICINE

## 2020-09-14 PROCEDURE — G0008 ADMIN INFLUENZA VIRUS VAC: HCPCS | Performed by: INTERNAL MEDICINE

## 2020-09-23 ENCOUNTER — OFFICE VISIT (OUTPATIENT)
Dept: INTERNAL MEDICINE | Facility: CLINIC | Age: 80
End: 2020-09-23

## 2020-09-23 ENCOUNTER — HOSPITAL ENCOUNTER (OUTPATIENT)
Dept: CARDIOLOGY | Facility: HOSPITAL | Age: 80
Discharge: HOME | End: 2020-09-23
Admitting: INTERNAL MEDICINE

## 2020-09-23 VITALS
HEIGHT: 61 IN | SYSTOLIC BLOOD PRESSURE: 110 MMHG | HEART RATE: 65 BPM | DIASTOLIC BLOOD PRESSURE: 64 MMHG | WEIGHT: 130 LBS | OXYGEN SATURATION: 97 % | BODY MASS INDEX: 24.55 KG/M2

## 2020-09-23 DIAGNOSIS — M79.89 RIGHT LEG SWELLING: ICD-10-CM

## 2020-09-23 DIAGNOSIS — L03.115 CELLULITIS OF RIGHT LOWER EXTREMITY: Primary | ICD-10-CM

## 2020-09-23 LAB
BH CV LOW VAS RIGHT LESSER SAPH VESSEL: 1
BH CV LOWER VASCULAR LEFT COMMON FEMORAL AUGMENT: NORMAL
BH CV LOWER VASCULAR LEFT COMMON FEMORAL COMPETENT: NORMAL
BH CV LOWER VASCULAR LEFT COMMON FEMORAL COMPRESS: NORMAL
BH CV LOWER VASCULAR LEFT COMMON FEMORAL PHASIC: NORMAL
BH CV LOWER VASCULAR LEFT COMMON FEMORAL SPONT: NORMAL
BH CV LOWER VASCULAR RIGHT COMMON FEMORAL AUGMENT: NORMAL
BH CV LOWER VASCULAR RIGHT COMMON FEMORAL COMPETENT: NORMAL
BH CV LOWER VASCULAR RIGHT COMMON FEMORAL COMPRESS: NORMAL
BH CV LOWER VASCULAR RIGHT COMMON FEMORAL PHASIC: NORMAL
BH CV LOWER VASCULAR RIGHT COMMON FEMORAL SPONT: NORMAL
BH CV LOWER VASCULAR RIGHT DISTAL FEMORAL COMPRESS: NORMAL
BH CV LOWER VASCULAR RIGHT GASTRONEMIUS COMPRESS: NORMAL
BH CV LOWER VASCULAR RIGHT GREATER SAPH AK COMPRESS: NORMAL
BH CV LOWER VASCULAR RIGHT GREATER SAPH BK COMPRESS: NORMAL
BH CV LOWER VASCULAR RIGHT LESSER SAPH COMPRESS: NORMAL
BH CV LOWER VASCULAR RIGHT LESSER SAPH THROMBUS: NORMAL
BH CV LOWER VASCULAR RIGHT MID FEMORAL AUGMENT: NORMAL
BH CV LOWER VASCULAR RIGHT MID FEMORAL COMPETENT: NORMAL
BH CV LOWER VASCULAR RIGHT MID FEMORAL COMPRESS: NORMAL
BH CV LOWER VASCULAR RIGHT MID FEMORAL PHASIC: NORMAL
BH CV LOWER VASCULAR RIGHT MID FEMORAL SPONT: NORMAL
BH CV LOWER VASCULAR RIGHT PERONEAL COMPRESS: NORMAL
BH CV LOWER VASCULAR RIGHT POPLITEAL AUGMENT: NORMAL
BH CV LOWER VASCULAR RIGHT POPLITEAL COMPETENT: NORMAL
BH CV LOWER VASCULAR RIGHT POPLITEAL COMPRESS: NORMAL
BH CV LOWER VASCULAR RIGHT POPLITEAL PHASIC: NORMAL
BH CV LOWER VASCULAR RIGHT POPLITEAL SPONT: NORMAL
BH CV LOWER VASCULAR RIGHT POSTERIOR TIBIAL COMPRESS: NORMAL
BH CV LOWER VASCULAR RIGHT PROFUNDA FEMORAL COMPRESS: NORMAL
BH CV LOWER VASCULAR RIGHT PROXIMAL FEMORAL COMPRESS: NORMAL
BH CV LOWER VASCULAR RIGHT SAPHENOFEMORAL JUNCTION COMPRESS: NORMAL

## 2020-09-23 PROCEDURE — 93971 EXTREMITY STUDY: CPT

## 2020-09-23 PROCEDURE — 99214 OFFICE O/P EST MOD 30 MIN: CPT | Performed by: INTERNAL MEDICINE

## 2020-09-23 RX ORDER — CEPHALEXIN 500 MG/1
500 CAPSULE ORAL 3 TIMES DAILY
Qty: 30 CAPSULE | Refills: 0 | Status: SHIPPED | OUTPATIENT
Start: 2020-09-23 | End: 2020-10-02

## 2020-09-23 NOTE — PROGRESS NOTES
RLE venous doppler study complete. Preliminary negative for DVT positive for chronic SVT called to Dr. Batres

## 2020-09-23 NOTE — PROGRESS NOTES
Subjective     Johana Corrigan is a 79 y.o. female who presents with   Chief Complaint   Patient presents with   • Wound Check     [facility] follow up       History of Present Illness     C/o wound on the RLE.  She hit her leg two days ago on Monday.  She went to the [facility].  She was given a topical.  Her leg was wrapped.  She felt like it was tight and took it off today.  Leg was more red and more swollen today.  It is mildly painful.      Review of Systems   Constitutional: Negative for fever.   Respiratory: Negative.    Cardiovascular: Negative.        The following portions of the patient's history were reviewed and updated as appropriate: allergies, current medications and problem list.  One place happened six weeks ago.  The other place happened two days ago.  She went to the [facility].  She was given mupirocin and it was wrapped.  She took it off today.      Patient Active Problem List    Diagnosis Date Noted   • Essential hypertension 10/07/2019   • Depressed mood 04/07/2017   • Atopic rhinitis 06/29/2016   • Osteoarthritis 06/29/2016   • Hyperlipidemia 06/29/2016   • Hypothyroidism 06/29/2016   • Leukopenia 06/29/2016       Current Outpatient Medications on File Prior to Visit   Medication Sig Dispense Refill   • azelastine (ASTELIN) 0.1 % nasal spray 2 sprays into the nostril(s) as directed by provider 2 (Two) Times a Day. Use in each nostril as directed 1 each 12   • cetirizine (zyrTEC) 10 MG tablet Take 1 tablet by mouth Daily. 90 tablet 1   • losartan (Cozaar) 50 MG tablet Take 1 tablet by mouth Daily. 90 tablet 3   • mupirocin (BACTROBAN) 2 % ointment Apply  topically to the appropriate area as directed 3 (Three) Times a Day. 30 g 2   • rosuvastatin (CRESTOR) 5 MG tablet Take 1 tablet by mouth Daily. 90 tablet 3   • SYNTHROID 75 MCG tablet Take 1 tablet by mouth Daily. 90 tablet 1   • Triamcinolone Acetonide (NASACORT) 55 MCG/ACT nasal inhaler 2 sprays into the nostril(s) as directed by provider Daily. 16.5 g 11   •  "[DISCONTINUED] mupirocin (BACTROBAN) 2 % ointment Apply to affected area with each dressing change.  Change dressing QD or more often as needed. 22 g 0   • [DISCONTINUED] cephalexin (KEFLEX) 500 MG capsule 2 po BID 40 capsule 0     Current Facility-Administered Medications on File Prior to Visit   Medication Dose Route Frequency Provider Last Rate Last Dose   • levalbuterol (XOPENEX) nebulizer solution 0.63 mg  0.63 mg Nebulization Q6H PRN Sola Gómez MD           Objective     /64   Pulse 65   Ht 154.9 cm (60.98\")   Wt 59 kg (130 lb)   SpO2 97%   BMI 24.58 kg/m²     Physical Exam  Constitutional:       Appearance: She is well-developed.   HENT:      Head: Normocephalic and atraumatic.   Cardiovascular:      Comments: RLE 2+ pitting edema.  Pulmonary:      Effort: Pulmonary effort is normal.   Skin:     Comments: Superficial excoriation with active clear drainage. Skin surrounding is red.    Neurological:      Mental Status: She is alert and oriented to person, place, and time.   Psychiatric:         Behavior: Behavior normal.         Assessment/Plan   Johana was seen today for wound check.    Diagnoses and all orders for this visit:    Cellulitis of right lower extremity    Right leg swelling  -     Duplex Venous Lower Extremity - Right CAR; Future    Other orders  -     cephalexin (Keflex) 500 MG capsule; Take 1 capsule by mouth 3 (Three) Times a Day.        Discussion  Patient presents with a wound and resulting cellulitis of the RLE.  Rx for antibiotics.  Given swelling a STAT venous doppler is ordered.  She will keep covered and use prn mupirocin.  She is encouraged to wrap her leg.  F/u on Tuesday with Dr. Gómez.  Let me know if not feeling better over the next 2 days or if there is any change in symptoms.  I could see her on Friday if need be.           Future Appointments   Date Time Provider Department Center   9/23/2020  3:45 PM [facility]   9/29/2020  7:45 AM " Sola Gómez MD [facility] None   1/18/2021  1:15 PM Sola Gómez MD [facility] None

## 2020-09-29 ENCOUNTER — OFFICE VISIT (OUTPATIENT)
Dept: INTERNAL MEDICINE | Facility: CLINIC | Age: 80
End: 2020-09-29

## 2020-09-29 VITALS
HEART RATE: 67 BPM | SYSTOLIC BLOOD PRESSURE: 126 MMHG | WEIGHT: 132 LBS | BODY MASS INDEX: 24.92 KG/M2 | DIASTOLIC BLOOD PRESSURE: 88 MMHG | HEIGHT: 61 IN | TEMPERATURE: 97.1 F

## 2020-09-29 DIAGNOSIS — Z11.59 ENCOUNTER FOR SCREENING FOR OTHER VIRAL DISEASES: ICD-10-CM

## 2020-09-29 DIAGNOSIS — S81.801A WOUND OF RIGHT LOWER EXTREMITY, INITIAL ENCOUNTER: Primary | ICD-10-CM

## 2020-09-29 DIAGNOSIS — L03.115 CELLULITIS OF RIGHT LOWER EXTREMITY: ICD-10-CM

## 2020-09-29 LAB
HCT VFR BLDA CALC: 41 % (ref 38–51)
HGB BLDA-MCNC: 13.2 G/DL (ref 12–17)
MCH, POC: 30.2
MCHC, POC: 32.2
MCV, POC: 93.7
PLATELET # BLD AUTO: 202 10*3/MM3
PMV BLD: 7.4 FL
RBC, POC: 4.37
RDW, POC: 13.3
WBC # BLD: 3.9 10*3/UL

## 2020-09-29 PROCEDURE — 85027 COMPLETE CBC AUTOMATED: CPT | Performed by: INTERNAL MEDICINE

## 2020-09-29 PROCEDURE — 99214 OFFICE O/P EST MOD 30 MIN: CPT | Performed by: INTERNAL MEDICINE

## 2020-09-29 RX ORDER — DOXYCYCLINE 100 MG/1
100 CAPSULE ORAL 2 TIMES DAILY
Qty: 14 CAPSULE | Refills: 0 | Status: SHIPPED | OUTPATIENT
Start: 2020-09-29 | End: 2020-10-02 | Stop reason: SDUPTHER

## 2020-09-29 NOTE — PROGRESS NOTES
Chief Complaint   Patient presents with   • Wound Infection       History of Present Illness   Johana Corrigan is a 79 y.o. female presents for acute care. Right lower extremity wound. Occurred 9/21 cut leg on [employer] furniture moving from her home. Started on keflex 9/23 and had doppler assesment. She has superficial thrombophlebitis in small saphenous but no dvt. She now has continued swelling and erythema. She has not been elevating the area due to moving into her new home. Up and unpackaging her boxes.     The following portions of the patient's history were reviewed and updated as appropriate: allergies, current medications, past family history, past medical history, past social history, past surgical history and problem list.  Current Outpatient Medications on File Prior to Visit   Medication Sig Dispense Refill   • azelastine (ASTELIN) 0.1 % nasal spray 2 sprays into the nostril(s) as directed by provider 2 (Two) Times a Day. Use in each nostril as directed 1 each 12   • cephalexin (Keflex) 500 MG capsule Take 1 capsule by mouth 3 (Three) Times a Day. 30 capsule 0   • cetirizine (zyrTEC) 10 MG tablet Take 1 tablet by mouth Daily. 90 tablet 1   • losartan (Cozaar) 50 MG tablet Take 1 tablet by mouth Daily. 90 tablet 3   • mupirocin (BACTROBAN) 2 % ointment Apply  topically to the appropriate area as directed 3 (Three) Times a Day. 30 g 2   • rosuvastatin (CRESTOR) 5 MG tablet Take 1 tablet by mouth Daily. 90 tablet 3   • SYNTHROID 75 MCG tablet Take 1 tablet by mouth Daily. 90 tablet 1   • Triamcinolone Acetonide (NASACORT) 55 MCG/ACT nasal inhaler 2 sprays into the nostril(s) as directed by provider Daily. 16.5 g 11     Current Facility-Administered Medications on File Prior to Visit   Medication Dose Route Frequency Provider Last Rate Last Dose   • levalbuterol (XOPENEX) nebulizer solution 0.63 mg  0.63 mg Nebulization Q6H PRN Sola Gómez MD         Review of Systems   Constitutional: Negative.  Negative  "for fever.   HENT: Negative.    Eyes: Negative.    Respiratory: Negative.    Cardiovascular: Negative.    Gastrointestinal: Negative.    Endocrine: Negative.    Genitourinary: Negative.    Musculoskeletal: Negative.    Skin: Positive for wound.   Allergic/Immunologic: Negative.    Neurological: Negative.    Hematological: Negative.    Psychiatric/Behavioral: Negative.        Objective   Physical Exam  Vitals signs and nursing note reviewed.   Constitutional:       Appearance: Normal appearance.   HENT:      Head: Normocephalic and atraumatic.      Right Ear: Tympanic membrane normal.      Left Ear: Tympanic membrane normal.      Nose: Nose normal.      Mouth/Throat:      Mouth: Mucous membranes are moist.   Eyes:      Extraocular Movements: Extraocular movements intact.      Pupils: Pupils are equal, round, and reactive to light.   Neck:      Musculoskeletal: Normal range of motion and neck supple.   Cardiovascular:      Rate and Rhythm: Normal rate and regular rhythm.      Pulses: Normal pulses.      Heart sounds: Normal heart sounds.   Pulmonary:      Effort: Pulmonary effort is normal.      Breath sounds: Normal breath sounds.   Abdominal:      General: Abdomen is flat.      Palpations: Abdomen is soft.   Musculoskeletal: Normal range of motion.   Skin:     Comments: Right lower extremity swollen w/ extensive erythema and tender   Neurological:      General: No focal deficit present.      Mental Status: She is alert and oriented to person, place, and time.   Psychiatric:         Mood and Affect: Mood normal.         Behavior: Behavior normal.          /100   Pulse 67   Temp 97.1 °F (36.2 °C)   Ht 154.9 cm (61\")   Wt 59.9 kg (132 lb)   BMI 24.94 kg/m²     Assessment/Plan   Diagnoses and all orders for this visit:    Wound of right lower extremity, initial encounter  -     Ambulatory Referral to Wound Clinic    Other orders  -     doxycycline (MONODOX) 100 MG capsule; Take 1 capsule by mouth 2 (Two) " Times a Day.      Patient w/ right lower extremity wound/ cellulitis. Continued erythema. Will d/c keflex and switch to doxycycline. Will refer to wound care clinic to instruct on appropriate wrapping. She is strongly advised to keep her leg elevated. She keep area clean and continue to use topical mupirocin. Negative for dvt but does have a superficial thrombosis. Elevation and warm compress. She will f/u on fri for further evaluation.

## 2020-09-30 LAB
BUN SERPL-MCNC: 14 MG/DL (ref 8–23)
BUN/CREAT SERPL: 19.2 (ref 7–25)
CALCIUM SERPL-MCNC: 9.2 MG/DL (ref 8.6–10.5)
CHLORIDE SERPL-SCNC: 102 MMOL/L (ref 98–107)
CO2 SERPL-SCNC: 22.3 MMOL/L (ref 22–29)
CREAT SERPL-MCNC: 0.73 MG/DL (ref 0.57–1)
GLUCOSE SERPL-MCNC: 91 MG/DL (ref 65–99)
POTASSIUM SERPL-SCNC: 4.1 MMOL/L (ref 3.5–5.2)
SARS-COV-2 AB SERPL QL IA: NEGATIVE
SODIUM SERPL-SCNC: 137 MMOL/L (ref 136–145)

## 2020-10-02 ENCOUNTER — OFFICE VISIT (OUTPATIENT)
Dept: INTERNAL MEDICINE | Facility: CLINIC | Age: 80
End: 2020-10-02

## 2020-10-02 VITALS
WEIGHT: 132 LBS | BODY MASS INDEX: 24.92 KG/M2 | HEART RATE: 68 BPM | DIASTOLIC BLOOD PRESSURE: 60 MMHG | SYSTOLIC BLOOD PRESSURE: 144 MMHG | HEIGHT: 61 IN

## 2020-10-02 DIAGNOSIS — I10 ESSENTIAL HYPERTENSION: ICD-10-CM

## 2020-10-02 DIAGNOSIS — L03.115 CELLULITIS OF RIGHT LOWER EXTREMITY: Primary | ICD-10-CM

## 2020-10-02 DIAGNOSIS — R60.9 EDEMA, UNSPECIFIED TYPE: ICD-10-CM

## 2020-10-02 PROCEDURE — 99214 OFFICE O/P EST MOD 30 MIN: CPT | Performed by: INTERNAL MEDICINE

## 2020-10-02 RX ORDER — DOXYCYCLINE 100 MG/1
100 CAPSULE ORAL 2 TIMES DAILY
Qty: 14 CAPSULE | Refills: 0 | OUTPATIENT
Start: 2020-10-02 | End: 2021-01-16

## 2020-10-02 RX ORDER — SACCHAROMYCES BOULARDII 250 MG
250 CAPSULE ORAL 2 TIMES DAILY
Qty: 30 CAPSULE | Refills: 1 | Status: SHIPPED | OUTPATIENT
Start: 2020-10-02 | End: 2021-04-16 | Stop reason: SDUPTHER

## 2020-10-02 RX ORDER — HYDROCHLOROTHIAZIDE 12.5 MG/1
12.5 TABLET ORAL DAILY
Qty: 30 TABLET | Refills: 1 | Status: SHIPPED | OUTPATIENT
Start: 2020-10-02 | End: 2021-12-17 | Stop reason: DRUGHIGH

## 2020-10-02 NOTE — PROGRESS NOTES
Chief Complaint   Patient presents with   • Cellulitis   • Other     peripheral edema       History of Present Illness   Johana Corrigan is a 79 y.o. female presents for follow up evaluation. Patient has right lower extremity edema w/ cellulitis. She was switched from keflex to doxy. Has been elevating leg routinely. She now has less erythema and less edema. She did test negative for dvt. She has not had a fever. She does note some discomfort in the evening and she does sleep on her right side.     The following portions of the patient's history were reviewed and updated as appropriate: allergies, current medications, past family history, past medical history, past social history, past surgical history and problem list.  Current Outpatient Medications on File Prior to Visit   Medication Sig Dispense Refill   • azelastine (ASTELIN) 0.1 % nasal spray 2 sprays into the nostril(s) as directed by provider 2 (Two) Times a Day. Use in each nostril as directed 1 each 12   • losartan (Cozaar) 50 MG tablet Take 1 tablet by mouth Daily. 90 tablet 3   • mupirocin (BACTROBAN) 2 % ointment Apply  topically to the appropriate area as directed 3 (Three) Times a Day. 30 g 2   • rosuvastatin (CRESTOR) 5 MG tablet Take 1 tablet by mouth Daily. 90 tablet 3   • SYNTHROID 75 MCG tablet Take 1 tablet by mouth Daily. 90 tablet 1   • Triamcinolone Acetonide (NASACORT) 55 MCG/ACT nasal inhaler 2 sprays into the nostril(s) as directed by provider Daily. 16.5 g 11   • [DISCONTINUED] doxycycline (MONODOX) 100 MG capsule Take 1 capsule by mouth 2 (Two) Times a Day. 14 capsule 0   • cetirizine (zyrTEC) 10 MG tablet Take 1 tablet by mouth Daily. 90 tablet 1   • [DISCONTINUED] cephalexin (Keflex) 500 MG capsule Take 1 capsule by mouth 3 (Three) Times a Day. 30 capsule 0     Current Facility-Administered Medications on File Prior to Visit   Medication Dose Route Frequency Provider Last Rate Last Dose   • levalbuterol (XOPENEX) nebulizer solution 0.63  "mg  0.63 mg Nebulization Q6H PRN Sola Góemz MD         Review of Systems   Constitutional: Negative.    HENT: Negative.    Eyes: Negative.    Respiratory: Negative.    Cardiovascular: Negative.    Gastrointestinal: Negative.    Endocrine: Negative.    Genitourinary: Negative.    Musculoskeletal: Negative.    Skin: Positive for wound.   Allergic/Immunologic: Negative.    Neurological: Negative.    Hematological: Negative.    Psychiatric/Behavioral: Negative.        Objective   Physical Exam  Vitals signs and nursing note reviewed.   Constitutional:       Appearance: Normal appearance.   HENT:      Head: Normocephalic and atraumatic.      Right Ear: Tympanic membrane normal.      Left Ear: Tympanic membrane normal.      Nose: Nose normal.      Mouth/Throat:      Mouth: Mucous membranes are moist.   Eyes:      Extraocular Movements: Extraocular movements intact.      Pupils: Pupils are equal, round, and reactive to light.   Neck:      Musculoskeletal: Normal range of motion and neck supple.   Cardiovascular:      Rate and Rhythm: Normal rate and regular rhythm.      Pulses: Normal pulses.      Comments: Continued but reduced edema B lower extremity.   Pulmonary:      Effort: Pulmonary effort is normal.   Abdominal:      General: Abdomen is flat.      Palpations: Abdomen is soft.   Skin:     Findings: Erythema present.      Comments: Wound w/ improving size and erythema.    Neurological:      General: No focal deficit present.      Mental Status: She is alert and oriented to person, place, and time.   Psychiatric:         Mood and Affect: Mood normal.         Behavior: Behavior normal.         Thought Content: Thought content normal.         Judgment: Judgment normal.          /60   Pulse 68   Ht 154.9 cm (61\")   Wt 59.9 kg (132 lb)   BMI 24.94 kg/m²     Assessment/Plan   Diagnoses and all orders for this visit:    Cellulitis of right lower extremity    Essential hypertension    Edema, unspecified " type    Other orders  -     doxycycline (MONODOX) 100 MG capsule; Take 1 capsule by mouth 2 (Two) Times a Day.  -     hydroCHLOROthiazide (HYDRODIURIL) 12.5 MG tablet; Take 1 tablet by mouth Daily.  -     saccharomyces boulardii (Florastor) 250 MG capsule; Take 1 capsule by mouth 2 (Two) Times a Day.      Patient w/ right lower extremity cellulitis. She will extend doxycycline for 14 days total. She will take a probiotic with this. She will take hctz for 3-5 days. Elevate lower extremity. Wrap w/ ACE if needed. F/u next week or prn.

## 2020-10-06 ENCOUNTER — OFFICE VISIT (OUTPATIENT)
Dept: INTERNAL MEDICINE | Facility: CLINIC | Age: 80
End: 2020-10-06

## 2020-10-06 VITALS
HEART RATE: 79 BPM | SYSTOLIC BLOOD PRESSURE: 142 MMHG | BODY MASS INDEX: 24.94 KG/M2 | WEIGHT: 132 LBS | DIASTOLIC BLOOD PRESSURE: 70 MMHG

## 2020-10-06 DIAGNOSIS — R60.9 PERIPHERAL EDEMA: ICD-10-CM

## 2020-10-06 DIAGNOSIS — I10 ESSENTIAL HYPERTENSION: ICD-10-CM

## 2020-10-06 DIAGNOSIS — L03.115 CELLULITIS OF RIGHT LOWER EXTREMITY: Primary | ICD-10-CM

## 2020-10-06 PROCEDURE — 99214 OFFICE O/P EST MOD 30 MIN: CPT | Performed by: INTERNAL MEDICINE

## 2020-10-06 NOTE — PROGRESS NOTES
Chief Complaint   Patient presents with   • Wound Check       History of Present Illness   Johana Corrigan is a 79 y.o. female presents for follow up evaluation. riht lower extremity cellulitis. Patient was switched from keflex to doxycycline. She is having notable improvement in her wound. She is elevating her leg and is taking hctz qd. Patient's bp is improving and her swelling is improving.       The following portions of the patient's history were reviewed and updated as appropriate: allergies, current medications, past family history, past medical history, past social history, past surgical history and problem list.  Current Outpatient Medications on File Prior to Visit   Medication Sig Dispense Refill   • azelastine (ASTELIN) 0.1 % nasal spray 2 sprays into the nostril(s) as directed by provider 2 (Two) Times a Day. Use in each nostril as directed 1 each 12   • cetirizine (zyrTEC) 10 MG tablet Take 1 tablet by mouth Daily. 90 tablet 1   • doxycycline (MONODOX) 100 MG capsule Take 1 capsule by mouth 2 (Two) Times a Day. 14 capsule 0   • hydroCHLOROthiazide (HYDRODIURIL) 12.5 MG tablet Take 1 tablet by mouth Daily. 30 tablet 1   • losartan (Cozaar) 50 MG tablet Take 1 tablet by mouth Daily. 90 tablet 3   • mupirocin (BACTROBAN) 2 % ointment Apply  topically to the appropriate area as directed 3 (Three) Times a Day. 30 g 2   • rosuvastatin (CRESTOR) 5 MG tablet Take 1 tablet by mouth Daily. 90 tablet 3   • saccharomyces boulardii (Florastor) 250 MG capsule Take 1 capsule by mouth 2 (Two) Times a Day. 30 capsule 1   • SYNTHROID 75 MCG tablet Take 1 tablet by mouth Daily. 90 tablet 1   • Triamcinolone Acetonide (NASACORT) 55 MCG/ACT nasal inhaler 2 sprays into the nostril(s) as directed by provider Daily. 16.5 g 11     Current Facility-Administered Medications on File Prior to Visit   Medication Dose Route Frequency Provider Last Rate Last Dose   • levalbuterol (XOPENEX) nebulizer solution 0.63 mg  0.63 mg  Nebulization Q6H PRN Sola Gómez MD         Review of Systems   Constitutional: Negative.    HENT: Negative.    Eyes: Negative.    Respiratory: Negative.    Cardiovascular: Positive for leg swelling.        Peripheral edema improving w/ hctz and leg elevation   Gastrointestinal: Negative.    Endocrine: Negative.    Genitourinary: Negative.    Musculoskeletal: Negative.    Skin: Negative.    Allergic/Immunologic: Negative.    Neurological: Negative.    Hematological: Negative.    Psychiatric/Behavioral: Negative.        Objective   Physical Exam  Vitals signs and nursing note reviewed.   Constitutional:       Appearance: Normal appearance.   HENT:      Head: Normocephalic and atraumatic.      Nose: Nose normal.      Mouth/Throat:      Mouth: Mucous membranes are moist.   Eyes:      Extraocular Movements: Extraocular movements intact.   Cardiovascular:      Rate and Rhythm: Normal rate and regular rhythm.      Pulses: Normal pulses.      Comments: Mild peripheral edema. Improved from prior.   Pulmonary:      Effort: Pulmonary effort is normal.   Skin:     General: Skin is warm and dry.      Comments: Almost fully resolved erythema right lower extremity.    Neurological:      General: No focal deficit present.      Mental Status: She is alert and oriented to person, place, and time.   Psychiatric:         Mood and Affect: Mood normal.         Behavior: Behavior normal.          /70   Pulse 79   Wt 59.9 kg (132 lb)   BMI 24.94 kg/m²     Assessment/Plan   Diagnoses and all orders for this visit:    Cellulitis of right lower extremity    Essential hypertension  -     Basic Metabolic Panel    Peripheral edema        Patient will complete 10-14 total days of doxycycline. Will test potassium level today to ensure no hypokalemia on hctz. She has an appointment w/ ortho for possible fracture 5th toe or foot. Zac tape for now. Xray tomorrow at ortho. F/u here prn.

## 2020-10-07 LAB
BUN SERPL-MCNC: 21 MG/DL (ref 8–23)
BUN/CREAT SERPL: 25.9 (ref 7–25)
CALCIUM SERPL-MCNC: 9.9 MG/DL (ref 8.6–10.5)
CHLORIDE SERPL-SCNC: 98 MMOL/L (ref 98–107)
CO2 SERPL-SCNC: 24.6 MMOL/L (ref 22–29)
CREAT SERPL-MCNC: 0.81 MG/DL (ref 0.57–1)
GLUCOSE SERPL-MCNC: 94 MG/DL (ref 65–99)
POTASSIUM SERPL-SCNC: 4.2 MMOL/L (ref 3.5–5.2)
SODIUM SERPL-SCNC: 135 MMOL/L (ref 136–145)

## 2020-10-14 ENCOUNTER — TRANSCRIBE ORDERS (OUTPATIENT)
Dept: ADMINISTRATIVE | Facility: HOSPITAL | Age: 80
End: 2020-10-14

## 2020-10-14 ENCOUNTER — APPOINTMENT (OUTPATIENT)
Dept: WOUND CARE | Facility: HOSPITAL | Age: 80
End: 2020-10-14

## 2020-10-14 DIAGNOSIS — Z12.31 SCREENING MAMMOGRAM, ENCOUNTER FOR: Primary | ICD-10-CM

## 2020-10-14 RX ORDER — LEVOTHYROXINE SODIUM 75 MCG
TABLET ORAL
Qty: 90 TABLET | Refills: 0 | Status: SHIPPED | OUTPATIENT
Start: 2020-10-14 | End: 2020-10-28 | Stop reason: SDUPTHER

## 2020-10-28 RX ORDER — LEVOTHYROXINE SODIUM 75 MCG
75 TABLET ORAL DAILY
Qty: 90 TABLET | Refills: 0 | Status: SHIPPED | OUTPATIENT
Start: 2020-10-28 | End: 2021-01-07

## 2020-10-28 NOTE — TELEPHONE ENCOUNTER
Caller: Johana Corrigan    Relationship: Self    Best call back number: [phone]  Medication needed:   Requested Prescriptions     Pending Prescriptions Disp Refills   • Synthroid 75 MCG tablet 90 tablet 0     Sig: Take 1 tablet by mouth Daily.       When do you need the refill by: TODAY    What details did the patient provide when requesting the medication:     MS. CORRIGAN HAS NOT RECEIVED HER SYNTHROID REFILL FOR THE LAST 2 MONTHS, SO SHE HAS NOT BEEN TAKING SYNTHROID FOR 2 MONTHS.SHE IS NEEDING ENOUGH SENT TO PHARMACY UNTIL HER MAIL ORDER RESENDS.     Does the patient have less than a 3 day supply:  [x] Yes  [] No    What is the patient's preferred pharmacy: [facility] AT [facility] [address] - [phone]  - [phone] FX

## 2020-11-06 ENCOUNTER — OFFICE VISIT (OUTPATIENT)
Dept: INTERNAL MEDICINE | Facility: CLINIC | Age: 80
End: 2020-11-06

## 2020-11-06 VITALS
BODY MASS INDEX: 25.11 KG/M2 | HEIGHT: 61 IN | SYSTOLIC BLOOD PRESSURE: 133 MMHG | HEART RATE: 67 BPM | WEIGHT: 133 LBS | DIASTOLIC BLOOD PRESSURE: 68 MMHG

## 2020-11-06 DIAGNOSIS — I10 HYPERTENSION, UNSPECIFIED TYPE: ICD-10-CM

## 2020-11-06 DIAGNOSIS — M16.0 OSTEOARTHRITIS OF BOTH HIPS, UNSPECIFIED OSTEOARTHRITIS TYPE: ICD-10-CM

## 2020-11-06 DIAGNOSIS — H92.01 OTALGIA OF RIGHT EAR: Primary | ICD-10-CM

## 2020-11-06 DIAGNOSIS — J34.89 RHINORRHEA: ICD-10-CM

## 2020-11-06 PROCEDURE — 99214 OFFICE O/P EST MOD 30 MIN: CPT | Performed by: INTERNAL MEDICINE

## 2020-11-06 RX ORDER — TRIAMCINOLONE ACETONIDE 55 UG/1
2 SPRAY, METERED NASAL DAILY
Qty: 3 BOTTLE | Refills: 3 | Status: SHIPPED | OUTPATIENT
Start: 2020-11-06 | End: 2021-01-18 | Stop reason: SDUPTHER

## 2020-11-06 NOTE — PROGRESS NOTES
Chief Complaint   Patient presents with   • Earache     right ear   • Hypertension   • Hip Pain       History of Present Illness   Johana Corrigan is a 79 y.o. female presents for acute care. Patient has right side otalgia. She has had increased sinus drainage recently. She notes that as the sinus is feeling less clogged she has less ear pain. She is using astelin nasal 2 spray daily. She has not used nasacort with this. She has some back and hip pain. Notes improvement w/ movement. She does get beneift from advil when needed. She is on losartan for bp regulation and reports that bp is normotensive at home.       The following portions of the patient's history were reviewed and updated as appropriate: allergies, current medications, past family history, past medical history, past social history, past surgical history and problem list.  Current Outpatient Medications on File Prior to Visit   Medication Sig Dispense Refill   • azelastine (ASTELIN) 0.1 % nasal spray 2 sprays into the nostril(s) as directed by provider 2 (Two) Times a Day. Use in each nostril as directed 1 each 12   • cetirizine (zyrTEC) 10 MG tablet Take 1 tablet by mouth Daily. 90 tablet 1   • doxycycline (MONODOX) 100 MG capsule Take 1 capsule by mouth 2 (Two) Times a Day. 14 capsule 0   • hydroCHLOROthiazide (HYDRODIURIL) 12.5 MG tablet Take 1 tablet by mouth Daily. 30 tablet 1   • losartan (Cozaar) 50 MG tablet Take 1 tablet by mouth Daily. 90 tablet 3   • mupirocin (BACTROBAN) 2 % ointment Apply  topically to the appropriate area as directed 3 (Three) Times a Day. 30 g 2   • rosuvastatin (CRESTOR) 5 MG tablet Take 1 tablet by mouth Daily. 90 tablet 3   • saccharomyces boulardii (Florastor) 250 MG capsule Take 1 capsule by mouth 2 (Two) Times a Day. 30 capsule 1   • Synthroid 75 MCG tablet Take 1 tablet by mouth Daily. 90 tablet 0   • [DISCONTINUED] Triamcinolone Acetonide (NASACORT) 55 MCG/ACT nasal inhaler 2 sprays into the nostril(s) as directed  by provider Daily. 16.5 g 11     Current Facility-Administered Medications on File Prior to Visit   Medication Dose Route Frequency Provider Last Rate Last Dose   • levalbuterol (XOPENEX) nebulizer solution 0.63 mg  0.63 mg Nebulization Q6H PRN Sola Gómez MD         Review of Systems   Constitutional: Negative.    HENT: Positive for ear pain, postnasal drip and rhinorrhea. Negative for sinus pressure, sinus pain and sore throat.    Eyes: Negative.    Respiratory: Negative.    Cardiovascular: Negative.    Gastrointestinal: Negative.    Endocrine: Negative.    Genitourinary: Negative.    Musculoskeletal: Negative.    Skin: Negative.    Allergic/Immunologic: Negative.    Neurological: Negative.    Hematological: Negative.    Psychiatric/Behavioral: Negative.        Objective   Physical Exam  Vitals signs and nursing note reviewed.   Constitutional:       Appearance: Normal appearance.   HENT:      Head: Normocephalic and atraumatic.      Right Ear: Tympanic membrane, ear canal and external ear normal.      Left Ear: Tympanic membrane, ear canal and external ear normal.      Nose: Nose normal.      Mouth/Throat:      Mouth: Mucous membranes are moist.   Eyes:      Extraocular Movements: Extraocular movements intact.      Pupils: Pupils are equal, round, and reactive to light.   Cardiovascular:      Rate and Rhythm: Normal rate and regular rhythm.      Pulses: Normal pulses.      Heart sounds: Normal heart sounds.   Pulmonary:      Effort: Pulmonary effort is normal.      Breath sounds: Normal breath sounds.   Abdominal:      General: Abdomen is flat.      Palpations: Abdomen is soft.   Musculoskeletal: Normal range of motion.   Skin:     General: Skin is warm and dry.   Neurological:      General: No focal deficit present.      Mental Status: She is alert and oriented to person, place, and time.   Psychiatric:         Mood and Affect: Mood normal.         Behavior: Behavior normal.         Thought Content: Thought  "content normal.         Judgment: Judgment normal.          /68   Pulse 67   Ht 154.9 cm (61\")   Wt 60.3 kg (133 lb)   BMI 25.13 kg/m²     Assessment/Plan   Diagnoses and all orders for this visit:    Otalgia of right ear    Rhinorrhea    Osteoarthritis of both hips, unspecified osteoarthritis type    Hypertension, unspecified type    Other orders  -     Triamcinolone Acetonide (NASACORT) 55 MCG/ACT nasal inhaler; 2 sprays into the nostril(s) as directed by provider Daily.      Patient w/ right ear pain. This is dissapaiting at this time. Does not appear to be TMJ. Would add nasacort to astelin and will take nasacort in the evening. May continue astelin bid. She may take tylenol cold and sinus 1/2-1 tab at night. For arthritic pain may take tylenol bid. advil w/ caution given htn and she will monitor this.            "

## 2021-01-06 RX ORDER — ROSUVASTATIN CALCIUM 5 MG/1
5 TABLET, COATED ORAL DAILY
Qty: 90 TABLET | Refills: 3 | Status: SHIPPED | OUTPATIENT
Start: 2021-01-06 | End: 2021-03-08 | Stop reason: SDUPTHER

## 2021-01-06 NOTE — TELEPHONE ENCOUNTER
Caller: Johana Corrigan    Relationship: Self    Best call back number: [phone]     Medication needed:   Requested Prescriptions     Pending Prescriptions Disp Refills   • rosuvastatin (CRESTOR) 5 MG tablet 90 tablet 3     Sig: Take 1 tablet by mouth Daily.       When do you need the refill by: 01/15/21    What details did the patient provide when requesting the medication: NEED NEW PRESCRIPTION SENT TO THIS PHARMACY DUE TO NO REFILLS    Does the patient have less than a 3 day supply:  [] Yes  [x] No    What is the patient's preferred pharmacy: [facility] - [facility] [address] [facility] - [phone]  - [phone] FX

## 2021-01-07 DIAGNOSIS — E78.5 HYPERLIPIDEMIA, UNSPECIFIED HYPERLIPIDEMIA TYPE: Primary | ICD-10-CM

## 2021-01-07 DIAGNOSIS — Z00.00 HEALTHCARE MAINTENANCE: ICD-10-CM

## 2021-01-07 DIAGNOSIS — E03.9 HYPOTHYROIDISM, UNSPECIFIED TYPE: ICD-10-CM

## 2021-01-07 DIAGNOSIS — I10 ESSENTIAL HYPERTENSION: ICD-10-CM

## 2021-01-07 RX ORDER — LEVOTHYROXINE SODIUM 75 MCG
TABLET ORAL
Qty: 90 TABLET | Refills: 0 | Status: SHIPPED | OUTPATIENT
Start: 2021-01-07 | End: 2021-04-29 | Stop reason: SDUPTHER

## 2021-01-12 LAB
ALBUMIN SERPL-MCNC: 4.6 G/DL (ref 3.5–5.2)
ALBUMIN/GLOB SERPL: 2.2 G/DL
ALP SERPL-CCNC: 68 U/L (ref 39–117)
ALT SERPL-CCNC: 19 U/L (ref 1–33)
APPEARANCE UR: CLEAR
AST SERPL-CCNC: 23 U/L (ref 1–32)
BACTERIA #/AREA URNS HPF: NORMAL /HPF
BASOPHILS # BLD AUTO: 0.06 10*3/MM3 (ref 0–0.2)
BASOPHILS NFR BLD AUTO: 1.3 % (ref 0–1.5)
BILIRUB SERPL-MCNC: 0.4 MG/DL (ref 0–1.2)
BILIRUB UR QL STRIP: NEGATIVE
BUN SERPL-MCNC: 22 MG/DL (ref 8–23)
BUN/CREAT SERPL: 29.7 (ref 7–25)
CALCIUM SERPL-MCNC: 9.5 MG/DL (ref 8.6–10.5)
CASTS URNS MICRO: NORMAL
CHLORIDE SERPL-SCNC: 101 MMOL/L (ref 98–107)
CHOLEST SERPL-MCNC: 198 MG/DL (ref 0–200)
CO2 SERPL-SCNC: 26.7 MMOL/L (ref 22–29)
COLOR UR: YELLOW
CREAT SERPL-MCNC: 0.74 MG/DL (ref 0.57–1)
EOSINOPHIL # BLD AUTO: 0.09 10*3/MM3 (ref 0–0.4)
EOSINOPHIL NFR BLD AUTO: 1.9 % (ref 0.3–6.2)
EPI CELLS #/AREA URNS HPF: NORMAL /HPF
ERYTHROCYTE [DISTWIDTH] IN BLOOD BY AUTOMATED COUNT: 12.4 % (ref 12.3–15.4)
GLOBULIN SER CALC-MCNC: 2.1 GM/DL
GLUCOSE SERPL-MCNC: 89 MG/DL (ref 65–99)
GLUCOSE UR QL: NEGATIVE
HCT VFR BLD AUTO: 45.7 % (ref 34–46.6)
HDLC SERPL-MCNC: 80 MG/DL (ref 40–60)
HGB BLD-MCNC: 15.9 G/DL (ref 12–15.9)
HGB UR QL STRIP: NEGATIVE
IMM GRANULOCYTES # BLD AUTO: 0.01 10*3/MM3 (ref 0–0.05)
IMM GRANULOCYTES NFR BLD AUTO: 0.2 % (ref 0–0.5)
KETONES UR QL STRIP: NEGATIVE
LDLC SERPL CALC-MCNC: 105 MG/DL (ref 0–100)
LEUKOCYTE ESTERASE UR QL STRIP: NEGATIVE
LYMPHOCYTES # BLD AUTO: 1.32 10*3/MM3 (ref 0.7–3.1)
LYMPHOCYTES NFR BLD AUTO: 27.5 % (ref 19.6–45.3)
MCH RBC QN AUTO: 31.9 PG (ref 26.6–33)
MCHC RBC AUTO-ENTMCNC: 34.8 G/DL (ref 31.5–35.7)
MCV RBC AUTO: 91.6 FL (ref 79–97)
MONOCYTES # BLD AUTO: 0.42 10*3/MM3 (ref 0.1–0.9)
MONOCYTES NFR BLD AUTO: 8.8 % (ref 5–12)
NEUTROPHILS # BLD AUTO: 2.9 10*3/MM3 (ref 1.7–7)
NEUTROPHILS NFR BLD AUTO: 60.3 % (ref 42.7–76)
NITRITE UR QL STRIP: NEGATIVE
NRBC BLD AUTO-RTO: 0 /100 WBC (ref 0–0.2)
PH UR STRIP: 6.5 [PH] (ref 5–8)
PLATELET # BLD AUTO: 224 10*3/MM3 (ref 140–450)
POTASSIUM SERPL-SCNC: 4.4 MMOL/L (ref 3.5–5.2)
PROT SERPL-MCNC: 6.7 G/DL (ref 6–8.5)
PROT UR QL STRIP: NEGATIVE
RBC # BLD AUTO: 4.99 10*6/MM3 (ref 3.77–5.28)
RBC #/AREA URNS HPF: NORMAL /HPF
SODIUM SERPL-SCNC: 138 MMOL/L (ref 136–145)
SP GR UR: 1.01 (ref 1–1.03)
TRIGL SERPL-MCNC: 70 MG/DL (ref 0–150)
TSH SERPL DL<=0.005 MIU/L-ACNC: 2.08 UIU/ML (ref 0.27–4.2)
UROBILINOGEN UR STRIP-MCNC: NORMAL MG/DL
VLDLC SERPL CALC-MCNC: 13 MG/DL (ref 5–40)
WBC # BLD AUTO: 4.8 10*3/MM3 (ref 3.4–10.8)
WBC #/AREA URNS HPF: NORMAL /HPF

## 2021-01-18 ENCOUNTER — OFFICE VISIT (OUTPATIENT)
Dept: INTERNAL MEDICINE | Facility: CLINIC | Age: 81
End: 2021-01-18

## 2021-01-18 VITALS
BODY MASS INDEX: 26.7 KG/M2 | WEIGHT: 136 LBS | SYSTOLIC BLOOD PRESSURE: 134 MMHG | DIASTOLIC BLOOD PRESSURE: 78 MMHG | HEART RATE: 78 BPM | HEIGHT: 60 IN

## 2021-01-18 DIAGNOSIS — Z00.00 HEALTHCARE MAINTENANCE: Primary | ICD-10-CM

## 2021-01-18 DIAGNOSIS — J06.9 UPPER RESPIRATORY TRACT INFECTION, UNSPECIFIED TYPE: ICD-10-CM

## 2021-01-18 DIAGNOSIS — Z78.0 MENOPAUSE: ICD-10-CM

## 2021-01-18 DIAGNOSIS — I10 ESSENTIAL HYPERTENSION: ICD-10-CM

## 2021-01-18 DIAGNOSIS — E03.9 HYPOTHYROIDISM, UNSPECIFIED TYPE: ICD-10-CM

## 2021-01-18 DIAGNOSIS — M85.80 OSTEOPENIA, UNSPECIFIED LOCATION: ICD-10-CM

## 2021-01-18 DIAGNOSIS — E78.5 HYPERLIPIDEMIA, UNSPECIFIED HYPERLIPIDEMIA TYPE: ICD-10-CM

## 2021-01-18 PROCEDURE — 96160 PT-FOCUSED HLTH RISK ASSMT: CPT | Performed by: INTERNAL MEDICINE

## 2021-01-18 PROCEDURE — G0439 PPPS, SUBSEQ VISIT: HCPCS | Performed by: INTERNAL MEDICINE

## 2021-01-18 PROCEDURE — 1160F RVW MEDS BY RX/DR IN RCRD: CPT | Performed by: INTERNAL MEDICINE

## 2021-01-18 PROCEDURE — 99214 OFFICE O/P EST MOD 30 MIN: CPT | Performed by: INTERNAL MEDICINE

## 2021-01-18 PROCEDURE — 1170F FXNL STATUS ASSESSED: CPT | Performed by: INTERNAL MEDICINE

## 2021-01-18 RX ORDER — TRIAMCINOLONE ACETONIDE 55 UG/1
2 SPRAY, METERED NASAL DAILY
Qty: 3 BOTTLE | Refills: 3 | Status: SHIPPED | OUTPATIENT
Start: 2021-01-18 | End: 2021-06-01 | Stop reason: SDUPTHER

## 2021-01-18 NOTE — PROGRESS NOTES
Subjective   AWV  htn  OA  Sinus pain/ pressure  Stye  Hyperlipidemia  Hypothyroidism    Johana Corrigan is a 80 y.o. female who presents for an annual wellness visit as well as check up of chronic and acute needs.        History of Present Illness   79 y/o wf. She is doing well today. She does have a 6 d history of sinus congestion and eye pain. She went to urgent care center for acute ocular pain w/ lid swelling. Started on topical erythromycin ophthalmic ointment and keflex. She notes a substantial improvement w/ her eye swelling. She is having some continued sinus drainage. Has hypertension. bp has been normotenisve at home. She is euthyroid. Lipids are at goal.     Compared to one year ago, the patient feels his physical health is the same.  Compared to one year ago, the patient feels his mental health is the same.    Reviewed chart for potential of high risk medication in the elderly: yes  Reviewed chart for potential of harmful drug interactions in the elderly:yes    Discussed the patient's BMI with him. The BMI is in the acceptable range.    Current medical providers:  Patient Care Team:  Sola Gómez MD as PCP - General    Review of Systems   Constitutional: Negative.    HENT: Negative.    Eyes: Negative.    Respiratory: Negative.    Cardiovascular: Negative.    Gastrointestinal: Negative.    Endocrine: Negative.    Genitourinary: Negative.    Musculoskeletal: Positive for arthralgias.   Skin: Negative.    Allergic/Immunologic: Negative.    Neurological: Negative.    Hematological: Negative.    Psychiatric/Behavioral: Negative.        The following portions of the patient's history were reviewed and updated as appropriate: allergies, current medications, past family history, past medical history, past social history, past surgical history and problem list.  Health maintenance tab was reviewed and updated with the patient.       Patient Active Problem List    Diagnosis Date Noted   • Essential hypertension  10/07/2019   • Depressed mood 04/07/2017   • Atopic rhinitis 06/29/2016   • Osteoarthritis 06/29/2016   • Hyperlipidemia 06/29/2016   • Hypothyroidism 06/29/2016   • Leukopenia 06/29/2016       Past Medical History:   Diagnosis Date   • Allergic    • Arthritis    • Cataract    • Dyspnea    • Headache    • Hyperlipidemia    • Hyperthyroidism    • Leukopenia    • Osteoarthritis        Past Surgical History:   Procedure Laterality Date   • BRONCHOSCOPY N/A 12/13/2018    Procedure: BRONCHOSCOPY WITH;  Surgeon: Gee Kenney MD;  Location: [facility] ENDOSCOPY;  Service: Pulmonary   • NOSE SURGERY     • SUBMANDIBULAR GLAND EXCISION     • TONSILLECTOMY     • TUBAL ABDOMINAL LIGATION         Family History   Problem Relation Age of Onset   • Heart disease Father    • Heart disease Brother    • Heart disease Mother        Social History     Socioeconomic History   • Marital status:      Spouse name: Not on file   • Number of children: Not on file   • Years of education: Not on file   • Highest education level: Not on file   Tobacco Use   • Smoking status: Former Smoker   • Smokeless tobacco: Never Used   Substance and Sexual Activity   • Alcohol use: Yes     Alcohol/week: 1.0 standard drinks     Types: 1 Glasses of wine per week     Comment: daily   • Drug use: No   • Sexual activity: Defer       Current Outpatient Medications on File Prior to Visit   Medication Sig Dispense Refill   • azelastine (ASTELIN) 0.1 % nasal spray 2 sprays into the nostril(s) as directed by provider 2 (Two) Times a Day. Use in each nostril as directed 1 each 12   • cetirizine (zyrTEC) 10 MG tablet Take 1 tablet by mouth Daily. 90 tablet 1   • erythromycin (ROMYCIN) 5 MG/GM ophthalmic ointment Administer  to the right eye Every 4 (Four) Hours While Awake. 3.5 g 0   • hydroCHLOROthiazide (HYDRODIURIL) 12.5 MG tablet Take 1 tablet by mouth Daily. 30 tablet 1   • losartan (Cozaar) 50 MG tablet Take 1 tablet by mouth Daily. 90 tablet 3   •  "rosuvastatin (CRESTOR) 5 MG tablet Take 1 tablet by mouth Daily. 90 tablet 3   • saccharomyces boulardii (Florastor) 250 MG capsule Take 1 capsule by mouth 2 (Two) Times a Day. 30 capsule 1   • Synthroid 75 MCG tablet TAKE 1 TABLET EVERY DAY 90 tablet 0   • Triamcinolone Acetonide (NASACORT) 55 MCG/ACT nasal inhaler 2 sprays into the nostril(s) as directed by provider Daily. 3 bottle 3   • [DISCONTINUED] cephalexin (KEFLEX) 500 MG capsule Take 1 capsule by mouth 2 (Two) Times a Day. 20 capsule 0     Current Facility-Administered Medications on File Prior to Visit   Medication Dose Route Frequency Provider Last Rate Last Admin   • levalbuterol (XOPENEX) nebulizer solution 0.63 mg  0.63 mg Nebulization Q6H PRN Sola Gómez MD           No Known Allergies    Immunization History   Administered Date(s) Administered   • COVID-19 (PFIZER) 01/01/2021   • Fluad Quad 65+ 10/07/2019, 09/14/2020   • Fluzone High Dose =>65 Years ([employer] ONLY) 10/04/2016, 10/04/2017, 09/11/2018   • Hepatitis A 12/10/2018, 01/16/2020   • Pneumococcal Conjugate 13-Valent (PCV13) 10/04/2016   • Pneumococcal Polysaccharide (PPSV23) 02/01/2010   • TD Preservative Free 10/07/2019   • Tdap 10/29/2013   • Zostavax 02/01/2010       Objective     /78   Pulse 78   Ht 152.4 cm (60\")   Wt 61.7 kg (136 lb)   BMI 26.56 kg/m²     Physical Exam  Vitals signs and nursing note reviewed.   Constitutional:       Appearance: Normal appearance. She is well-developed.   HENT:      Head: Normocephalic and atraumatic.      Right Ear: Tympanic membrane and external ear normal.      Left Ear: Tympanic membrane and external ear normal.      Nose: Nose normal.      Mouth/Throat:      Mouth: Mucous membranes are moist.   Eyes:      Extraocular Movements: Extraocular movements intact.      Pupils: Pupils are equal, round, and reactive to light.   Neck:      Musculoskeletal: Neck supple.   Cardiovascular:      Rate and Rhythm: Normal rate and regular rhythm.      " Heart sounds: Normal heart sounds.   Pulmonary:      Effort: Pulmonary effort is normal. No respiratory distress.      Breath sounds: Normal breath sounds.   Abdominal:      General: Abdomen is flat.      Palpations: Abdomen is soft.   Musculoskeletal: Normal range of motion.   Skin:     General: Skin is warm and dry.   Neurological:      Mental Status: She is alert and oriented to person, place, and time.   Psychiatric:         Mood and Affect: Mood normal.         Behavior: Behavior normal.         Thought Content: Thought content normal.         Judgment: Judgment normal.         Assessment/Plan   Diagnoses and all orders for this visit:    1. Healthcare maintenance (Primary)    2. Osteopenia, unspecified location  -     DEXA Bone Density Axial    3. Menopause  -     DEXA Bone Density Axial    4. Hypothyroidism, unspecified type    5. Hyperlipidemia, unspecified hyperlipidemia type    6. Essential hypertension        Discussion    AWV.      Medicare Risks and Personalized Health Plan  CMS Preventative Services Quick Reference  Declines DEXA but will schedule in the future. Mammogram is scheduled for tomorrow. She is advised to get shingrix vac 4-6 weeks after covid #2. She is current on derm, ophtho (appt wed), and dental. She will continue medicaions for bp, lipid, and thyroid regulation. She has a stye and will see eye specialist on wed as scheduled to see if she requires I and D. Has nasal drainage. Will continue astelin and will add nasacort daily. She will get covid screen due to nasal drainage. She will follow up in 6 months or prn.         ACP discussion was held with the patient during this visit. Patient has an advance directive in EMR which is still valid. .    Direct observation of cognitive abilities:  normal.        Depression Screen:    PHQ-2/PHQ-9 Depression Screening 1/18/2021   Little interest or pleasure in doing things 0   Feeling down, depressed, or hopeless 0   Total Score 0       Fall Risk  Screen:  CAMILO Fall Risk Assessment was completed, and patient is at LOW risk for falls.Assessment completed on:1/18/2021    Health Habits and Functional/Cognitive screen:  Functional & Cognitive Status 1/18/2021   Do you have difficulty preparing food and eating? No   Do you have difficulty bathing yourself, getting dressed or grooming yourself? No   Do you have difficulty using the toilet? No   Do you have difficulty moving around from place to place? -   Do you have trouble with steps or getting out of a bed or a chair? -   Current Diet Well Balanced Diet   Dental Exam Up to date   Eye Exam Up to date   Exercise (times per week) -   Current Exercise Activities Include -   Do you need help using the phone?  No   Are you deaf or do you have serious difficulty hearing?  No   Do you need help with transportation? No   Do you need help shopping? No   Do you need help preparing meals?  No   Do you need help with housework?  No   Do you need help with laundry? No   Do you need help taking your medications? No   Do you need help managing money? No   Do you ever drive or ride in a car without wearing a seat belt? No   Have you felt unusual stress, anger or loneliness in the last month? No   Who do you live with? Spouse   If you need help, do you have trouble finding someone available to you? No   Have you been bothered in the last four weeks by sexual problems? No   Do you have difficulty concentrating, remembering or making decisions? No       Health Maintenance   Topic Date Due   • ZOSTER VACCINE (2 of 3) 03/29/2010   • ANNUAL WELLNESS VISIT  01/16/2021   • MAMMOGRAM  11/19/2021   • LIPID PANEL  01/12/2022   • COLONOSCOPY  03/13/2025   • TDAP/TD VACCINES (3 - Td) 10/07/2029   • INFLUENZA VACCINE  Completed   • Pneumococcal Vaccine 65+  Completed   • MENINGOCOCCAL VACCINE  Aged Out            Future Appointments   Date Time Provider Department Center   1/19/2021 10:30 AM [facility]

## 2021-01-19 ENCOUNTER — HOSPITAL ENCOUNTER (OUTPATIENT)
Dept: MAMMOGRAPHY | Facility: HOSPITAL | Age: 81
Discharge: HOME | End: 2021-01-19
Admitting: INTERNAL MEDICINE

## 2021-01-19 DIAGNOSIS — Z12.31 SCREENING MAMMOGRAM, ENCOUNTER FOR: ICD-10-CM

## 2021-01-19 PROCEDURE — 77063 BREAST TOMOSYNTHESIS BI: CPT

## 2021-01-19 PROCEDURE — 77067 SCR MAMMO BI INCL CAD: CPT

## 2021-01-20 LAB — SARS-COV-2 RNA RESP QL NAA+PROBE: NOT DETECTED

## 2021-02-05 ENCOUNTER — TELEMEDICINE (OUTPATIENT)
Dept: INTERNAL MEDICINE | Facility: CLINIC | Age: 81
End: 2021-02-05

## 2021-02-05 DIAGNOSIS — R50.83 POST-VACCINATION FEVER: Primary | ICD-10-CM

## 2021-02-05 DIAGNOSIS — J34.89 SINUS DRAINAGE: ICD-10-CM

## 2021-02-05 PROCEDURE — 99213 OFFICE O/P EST LOW 20 MIN: CPT | Performed by: INTERNAL MEDICINE

## 2021-02-05 NOTE — PROGRESS NOTES
"No chief complaint on file.      History of Present Illness   Johana Corrigan is a 80 y.o. female presents for acute needs. Patient is seen by video in place of an office visit due to the covid-19 pandemic. She consents to visit by video. Patient reports acute needs. She has sinus drainage and congestion for \"quite some time\". She received second covid-19 vaccination yesterday. She now feels \"like I have the flu\". She has a temperature of 102. She took ibuprofen about 6 pm last night and had improvement but then was febrile again this morning.     The following portions of the patient's history were reviewed and updated as appropriate: allergies, current medications, past family history, past medical history, past social history, past surgical history and problem list.  Current Outpatient Medications on File Prior to Visit   Medication Sig Dispense Refill   • azelastine (ASTELIN) 0.1 % nasal spray 2 sprays into the nostril(s) as directed by provider 2 (Two) Times a Day. Use in each nostril as directed 1 each 12   • cetirizine (zyrTEC) 10 MG tablet Take 1 tablet by mouth Daily. 90 tablet 1   • erythromycin (ROMYCIN) 5 MG/GM ophthalmic ointment Administer  to the right eye Every 4 (Four) Hours While Awake. 3.5 g 0   • hydroCHLOROthiazide (HYDRODIURIL) 12.5 MG tablet Take 1 tablet by mouth Daily. 30 tablet 1   • losartan (Cozaar) 50 MG tablet Take 1 tablet by mouth Daily. 90 tablet 3   • rosuvastatin (CRESTOR) 5 MG tablet Take 1 tablet by mouth Daily. 90 tablet 3   • saccharomyces boulardii (Florastor) 250 MG capsule Take 1 capsule by mouth 2 (Two) Times a Day. 30 capsule 1   • Synthroid 75 MCG tablet TAKE 1 TABLET EVERY DAY 90 tablet 0   • Triamcinolone Acetonide (NASACORT) 55 MCG/ACT nasal inhaler 2 sprays into the nostril(s) as directed by provider Daily. 3 bottle 3     Current Facility-Administered Medications on File Prior to Visit   Medication Dose Route Frequency Provider Last Rate Last Admin   • levalbuterol " (XOPENEX) nebulizer solution 0.63 mg  0.63 mg Nebulization Q6H PRN Sola Gómez MD         Review of Systems   Constitutional: Positive for fatigue and fever.   HENT: Positive for postnasal drip. Negative for sinus pressure, sinus pain, sneezing and sore throat.    Eyes: Negative.    Respiratory: Negative for cough and shortness of breath.    Cardiovascular: Negative.    Gastrointestinal: Negative.    Endocrine: Negative.    Genitourinary: Negative.    Musculoskeletal: Positive for arthralgias.   Skin: Negative.    Allergic/Immunologic: Negative.    Neurological: Negative for headaches.   Hematological: Negative.    Psychiatric/Behavioral: Negative.        Objective   Physical Exam  Constitutional:       Comments: Ill appearing but not acutely distressed   HENT:      Head: Normocephalic and atraumatic.      Nose: Nose normal.      Mouth/Throat:      Mouth: Mucous membranes are moist.   Eyes:      Extraocular Movements: Extraocular movements intact.   Neck:      Musculoskeletal: Normal range of motion.   Pulmonary:      Effort: Pulmonary effort is normal.   Musculoskeletal: Normal range of motion.   Skin:     Comments: No rash   Neurological:      General: No focal deficit present.      Mental Status: She is alert and oriented to person, place, and time.   Psychiatric:         Mood and Affect: Mood normal.         Behavior: Behavior normal.         Thought Content: Thought content normal.         Judgment: Judgment normal.          There were no vitals taken for this visit.    Assessment/Plan   Diagnoses and all orders for this visit:    Post-vaccination fever    Sinus drainage        Pateint w/ acute post-vaccination fever. Last antipyretic >18 hours ago. Discussed w/ patient she should try alternating ibuprofen and tylenol at 3-4 hour intervals (for 6-8 hours between dosing of one or the other). She is to increase hydration. She may take robitussin or mucinex for sinus drainage and can continue antihistamine w/  nasal steroid spray. She is to go to er if any acute diffiulty breathing or fever fails to respond to above measures. She will f/u here in a prn fashion. Total visit 24 min w >50% spent counseling patient.

## 2021-02-09 ENCOUNTER — OFFICE VISIT (OUTPATIENT)
Dept: INTERNAL MEDICINE | Facility: CLINIC | Age: 81
End: 2021-02-09

## 2021-02-09 VITALS
DIASTOLIC BLOOD PRESSURE: 70 MMHG | BODY MASS INDEX: 26.56 KG/M2 | WEIGHT: 136 LBS | HEART RATE: 77 BPM | SYSTOLIC BLOOD PRESSURE: 122 MMHG

## 2021-02-09 DIAGNOSIS — J01.00 ACUTE NON-RECURRENT MAXILLARY SINUSITIS: Primary | ICD-10-CM

## 2021-02-09 DIAGNOSIS — H00.011 HORDEOLUM EXTERNUM OF RIGHT UPPER EYELID: ICD-10-CM

## 2021-02-09 PROCEDURE — 99214 OFFICE O/P EST MOD 30 MIN: CPT | Performed by: INTERNAL MEDICINE

## 2021-02-09 RX ORDER — METHYLPREDNISOLONE 4 MG/1
TABLET ORAL
Qty: 21 EACH | Refills: 0 | Status: SHIPPED | OUTPATIENT
Start: 2021-02-09 | End: 2021-04-16

## 2021-02-09 RX ORDER — DOXYCYCLINE 100 MG/1
100 CAPSULE ORAL 2 TIMES DAILY
Qty: 14 CAPSULE | Refills: 0 | Status: SHIPPED | OUTPATIENT
Start: 2021-02-09 | End: 2021-04-16

## 2021-02-09 NOTE — PROGRESS NOTES
Chief Complaint   Patient presents with   • Facial Pain     sinus pain and pressure   • hordeolum       History of Present Illness   Johana Corrigan is a 80 y.o. female presents for follow up evaluation. She is having sinus pressure and discomfort. She is using astelin and zyrtec daily. Has had sinus symptoms for 2 months now. She has a stye on her right eye. She has been using erythromycin ointment on this. She hsa been to er and to ophthalmologist. She has an appointment to look at this again as it is getting larger.       The following portions of the patient's history were reviewed and updated as appropriate: allergies, current medications, past family history, past medical history, past social history, past surgical history and problem list.  Current Outpatient Medications on File Prior to Visit   Medication Sig Dispense Refill   • azelastine (ASTELIN) 0.1 % nasal spray 2 sprays into the nostril(s) as directed by provider 2 (Two) Times a Day. Use in each nostril as directed 1 each 12   • cetirizine (zyrTEC) 10 MG tablet Take 1 tablet by mouth Daily. 90 tablet 1   • erythromycin (ROMYCIN) 5 MG/GM ophthalmic ointment Administer  to the right eye Every 4 (Four) Hours While Awake. 3.5 g 0   • hydroCHLOROthiazide (HYDRODIURIL) 12.5 MG tablet Take 1 tablet by mouth Daily. 30 tablet 1   • losartan (Cozaar) 50 MG tablet Take 1 tablet by mouth Daily. 90 tablet 3   • rosuvastatin (CRESTOR) 5 MG tablet Take 1 tablet by mouth Daily. 90 tablet 3   • saccharomyces boulardii (Florastor) 250 MG capsule Take 1 capsule by mouth 2 (Two) Times a Day. 30 capsule 1   • Synthroid 75 MCG tablet TAKE 1 TABLET EVERY DAY 90 tablet 0   • Triamcinolone Acetonide (NASACORT) 55 MCG/ACT nasal inhaler 2 sprays into the nostril(s) as directed by provider Daily. 3 bottle 3     Current Facility-Administered Medications on File Prior to Visit   Medication Dose Route Frequency Provider Last Rate Last Admin   • levalbuterol (XOPENEX) nebulizer  solution 0.63 mg  0.63 mg Nebulization Q6H PRN Sola Gómez MD         Review of Systems   Constitutional: Positive for fatigue.   HENT: Positive for rhinorrhea, sinus pain and sore throat.    Eyes: Positive for redness.        Right eye lid infection   Respiratory: Negative.    Cardiovascular: Negative.    Gastrointestinal: Negative.    Endocrine: Negative.    Genitourinary: Negative.    Musculoskeletal: Negative.    Skin:        Lid lesion as listed   Allergic/Immunologic: Negative.    Neurological: Negative.    Hematological: Negative.    Psychiatric/Behavioral: Negative.        Objective   Physical Exam  Vitals signs and nursing note reviewed.   Constitutional:       Appearance: Normal appearance.   HENT:      Head: Normocephalic and atraumatic.      Right Ear: Tympanic membrane normal.      Left Ear: Tympanic membrane normal.      Nose: Nose normal.      Mouth/Throat:      Mouth: Mucous membranes are moist.   Eyes:      Extraocular Movements: Extraocular movements intact.      Pupils: Pupils are equal, round, and reactive to light.   Neck:      Musculoskeletal: Normal range of motion.   Cardiovascular:      Rate and Rhythm: Normal rate and regular rhythm.      Pulses: Normal pulses.      Heart sounds: Normal heart sounds.   Pulmonary:      Effort: Pulmonary effort is normal.      Breath sounds: Normal breath sounds.   Abdominal:      General: Abdomen is flat.      Palpations: Abdomen is soft.   Musculoskeletal: Normal range of motion.   Skin:     Comments: Right lid swollen and erythematous   Neurological:      General: No focal deficit present.      Mental Status: She is alert and oriented to person, place, and time.   Psychiatric:         Mood and Affect: Mood normal.         Behavior: Behavior normal.         Thought Content: Thought content normal.         Judgment: Judgment normal.          /70   Pulse 77   Wt 61.7 kg (136 lb)   BMI 26.56 kg/m²     Assessment/Plan   Diagnoses and all orders for  this visit:    Acute non-recurrent maxillary sinusitis    Hordeolum externum of right upper eyelid    Other orders  -     methylPREDNISolone (MEDROL) 4 MG dose pack; Take as directed on package instructions.  -     doxycycline (MONODOX) 100 MG capsule; Take 1 capsule by mouth 2 (Two) Times a Day.    patient w/ acute to subacute sinusitis. Symptoms present for at least a month. Negative covid test at that time. Side effects from vaccinations have cleared. Will treat w/ doxycyline and medrol dose. She will continue ointment to her right eye and f/u w/ her ophthalmologist as scheduled. She is not having any visual changes. She will f/u if no improvement or symptoms worsen.

## 2021-03-08 RX ORDER — ROSUVASTATIN CALCIUM 5 MG/1
5 TABLET, COATED ORAL DAILY
Qty: 90 TABLET | Refills: 3 | Status: SHIPPED | OUTPATIENT
Start: 2021-03-08 | End: 2022-01-27 | Stop reason: SDUPTHER

## 2021-03-08 NOTE — TELEPHONE ENCOUNTER
Caller: Johana Corrigan    Relationship: Self    Best call back number: [phone]    Medication needed:   Requested Prescriptions     Pending Prescriptions Disp Refills   • rosuvastatin (CRESTOR) 5 MG tablet 90 tablet 3     Sig: Take 1 tablet by mouth Daily.       When do you need the refill by: 3/10/2021    What details did the patient provide when requesting the medication: PATIENT HAS 10 DOSES LEFT. REQUESTING REFILL EARLY FOR TIMELY MAILING PURPOSES.     Does the patient have less than a 3 day supply:  [x] Yes  [] No    What is the patient's preferred pharmacy: [facility] - [facility] [address] - [phone]  - [phone] FX

## 2021-03-24 ENCOUNTER — HOSPITAL ENCOUNTER (OUTPATIENT)
Dept: BONE DENSITY | Facility: HOSPITAL | Age: 81
Discharge: HOME | End: 2021-03-24
Admitting: INTERNAL MEDICINE

## 2021-03-24 PROCEDURE — 77080 DXA BONE DENSITY AXIAL: CPT

## 2021-04-02 ENCOUNTER — TELEPHONE (OUTPATIENT)
Dept: INTERNAL MEDICINE | Facility: CLINIC | Age: 81
End: 2021-04-02

## 2021-04-02 NOTE — TELEPHONE ENCOUNTER
PATIENT CALLED STATING THAT DR. ESCUDERO SUGGESTED THAT IT MIGHT BE BETTER FOR THE PATIENT TO GET HER SECOND SHINGRIX SHOT SOMEWHERE BESIDES IN OFFICE. SHE CHECKED AROUND THE LOCAL PHARMACIES AND THE CHEAPEST SHE FOUND WAS $180.     PATIENT WOULD LIKE TO KNOW HOW MUCH THE OFFICE WOULD CHARGE TO PROVIDE THIS INJECTION, AS SHE WOULD RATHER GIVE MONEY HERE THAN TO GIVE IT TO A PHARMACY.    PLEASE ADVISE  [phone]

## 2021-04-16 ENCOUNTER — OFFICE VISIT (OUTPATIENT)
Dept: INTERNAL MEDICINE | Facility: CLINIC | Age: 81
End: 2021-04-16

## 2021-04-16 VITALS
HEIGHT: 60 IN | SYSTOLIC BLOOD PRESSURE: 122 MMHG | OXYGEN SATURATION: 98 % | TEMPERATURE: 96.6 F | HEART RATE: 77 BPM | DIASTOLIC BLOOD PRESSURE: 70 MMHG | WEIGHT: 130 LBS | BODY MASS INDEX: 25.52 KG/M2

## 2021-04-16 DIAGNOSIS — L03.115 CELLULITIS OF RIGHT LOWER EXTREMITY: ICD-10-CM
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
## 2021-04-16 PROCEDURE — 99213 OFFICE O/P EST LOW 20 MIN: CPT | Performed by: INTERNAL MEDICINE

## 2021-04-16 RX ORDER — SACCHAROMYCES BOULARDII 250 MG
250 CAPSULE ORAL 2 TIMES DAILY
Qty: 30 CAPSULE | Refills: 1 | Status: SHIPPED | OUTPATIENT
Start: 2021-04-16 | End: 2021-11-03

## 2021-04-16 RX ORDER — CEPHALEXIN 500 MG/1
500 CAPSULE ORAL 2 TIMES DAILY
Qty: 14 CAPSULE | Refills: 0 | Status: SHIPPED | OUTPATIENT
Start: 2021-04-16 | End: 2021-06-01

## 2021-04-16 NOTE — PROGRESS NOTES
Chief Complaint   Patient presents with   • sores on leg       History of Present Illness   Johana Corrigan is a 80 y.o. female presents for acute care. She reports that she had an encounter with her sister in laws dog yesterday. She now has bruising and wounds x 2. Previously w/ difficult to heal injury lower extremity. She is using petroleum bandages. She has not had a fever. There is surrounding redness.     The following portions of the patient's history were reviewed and updated as appropriate: allergies, current medications, past family history, past medical history, past social history, past surgical history and problem list.  Current Outpatient Medications on File Prior to Visit   Medication Sig Dispense Refill   • azelastine (ASTELIN) 0.1 % nasal spray 2 sprays into the nostril(s) as directed by provider 2 (Two) Times a Day. Use in each nostril as directed 1 each 12   • cetirizine (zyrTEC) 10 MG tablet Take 1 tablet by mouth Daily. 90 tablet 1   • erythromycin (ROMYCIN) 5 MG/GM ophthalmic ointment Administer  to the right eye Every 4 (Four) Hours While Awake. 3.5 g 0   • hydroCHLOROthiazide (HYDRODIURIL) 12.5 MG tablet Take 1 tablet by mouth Daily. 30 tablet 1   • losartan (Cozaar) 50 MG tablet Take 1 tablet by mouth Daily. 90 tablet 3   • rosuvastatin (CRESTOR) 5 MG tablet Take 1 tablet by mouth Daily. 90 tablet 3   • Synthroid 75 MCG tablet TAKE 1 TABLET EVERY DAY 90 tablet 0   • Triamcinolone Acetonide (NASACORT) 55 MCG/ACT nasal inhaler 2 sprays into the nostril(s) as directed by provider Daily. 3 bottle 3   • [DISCONTINUED] saccharomyces boulardii (Florastor) 250 MG capsule Take 1 capsule by mouth 2 (Two) Times a Day. 30 capsule 1   • [DISCONTINUED] doxycycline (MONODOX) 100 MG capsule Take 1 capsule by mouth 2 (Two) Times a Day. 14 capsule 0   • [DISCONTINUED] methylPREDNISolone (MEDROL) 4 MG dose pack Take as directed on package instructions. 21 each 0     Current Facility-Administered Medications on  "File Prior to Visit   Medication Dose Route Frequency Provider Last Rate Last Admin   • levalbuterol (XOPENEX) nebulizer solution 0.63 mg  0.63 mg Nebulization Q6H PRN Sola Gómez MD         Review of Systems   Constitutional: Negative.    HENT: Negative.    Eyes: Negative.    Respiratory: Negative.    Cardiovascular: Negative.    Gastrointestinal: Negative.    Endocrine: Negative.    Genitourinary: Negative.    Musculoskeletal: Negative.    Skin: Positive for wound.   Allergic/Immunologic: Negative.    Neurological: Negative.    Hematological: Negative.    Psychiatric/Behavioral: Negative.        Objective   Physical Exam  Vitals and nursing note reviewed.   Constitutional:       Appearance: Normal appearance.   HENT:      Head: Normocephalic and atraumatic.      Right Ear: Tympanic membrane normal.      Left Ear: Tympanic membrane normal.      Nose: Nose normal.      Mouth/Throat:      Mouth: Mucous membranes are moist.   Eyes:      Extraocular Movements: Extraocular movements intact.      Pupils: Pupils are equal, round, and reactive to light.   Cardiovascular:      Rate and Rhythm: Normal rate and regular rhythm.      Pulses: Normal pulses.      Heart sounds: Normal heart sounds.   Pulmonary:      Effort: Pulmonary effort is normal.      Breath sounds: Normal breath sounds.   Abdominal:      General: Abdomen is flat.      Palpations: Abdomen is soft.   Musculoskeletal:         General: Normal range of motion.      Cervical back: Normal range of motion.   Skin:     Comments: Wound anterior and posterior lower extremity.      Neurological:      General: No focal deficit present.      Mental Status: She is alert and oriented to person, place, and time.   Psychiatric:         Mood and Affect: Mood normal.         Behavior: Behavior normal.         Thought Content: Thought content normal.         Judgment: Judgment normal.          /70   Pulse 77   Temp 96.6 °F (35.9 °C)   Ht 152.4 cm (60\")   Wt 59 kg " (130 lb)   SpO2 98%   BMI 25.39 kg/m²     Assessment/Plan   Diagnoses and all orders for this visit:    Dog scratch    Cellulitis of right lower extremity    Other orders  -     cephalexin (Keflex) 500 MG capsule; Take 1 capsule by mouth 2 (Two) Times a Day.  -     saccharomyces boulardii (Florastor) 250 MG capsule; Take 1 capsule by mouth 2 (Two) Times a Day.        Patient w/ cellulitis related to dog scratch. Will start keflex bid. May switch to alternative if not improving. She will take florastor bid for GI prophylaxis. Will continue routine wound care with antibacterial soap cleanse and petroleum gauze to cover. Follow up if worsens or fails to improve.

## 2021-04-29 RX ORDER — LEVOTHYROXINE SODIUM 75 MCG
75 TABLET ORAL DAILY
Qty: 90 TABLET | Refills: 0 | Status: SHIPPED | OUTPATIENT
Start: 2021-04-29 | End: 2021-08-02

## 2021-04-30 DIAGNOSIS — S81.809D OPEN WOUND OF LOWER EXTREMITY, UNSPECIFIED LATERALITY, SUBSEQUENT ENCOUNTER: Primary | ICD-10-CM

## 2021-04-30 RX ORDER — DOXYCYCLINE 100 MG/1
100 CAPSULE ORAL 2 TIMES DAILY
Qty: 14 CAPSULE | Refills: 0 | Status: SHIPPED | OUTPATIENT
Start: 2021-04-30 | End: 2021-06-01

## 2021-05-04 ENCOUNTER — OFFICE VISIT (OUTPATIENT)
Dept: WOUND CARE | Facility: HOSPITAL | Age: 81
End: 2021-05-04

## 2021-05-04 PROCEDURE — G0463 HOSPITAL OUTPT CLINIC VISIT: HCPCS

## 2021-05-18 ENCOUNTER — OFFICE VISIT (OUTPATIENT)
Dept: WOUND CARE | Facility: HOSPITAL | Age: 81
End: 2021-05-18

## 2021-05-18 PROCEDURE — G0463 HOSPITAL OUTPT CLINIC VISIT: HCPCS

## 2021-05-20 RX ORDER — LOSARTAN POTASSIUM 50 MG/1
50 TABLET ORAL DAILY
Qty: 90 TABLET | Refills: 3 | Status: SHIPPED | OUTPATIENT
Start: 2021-05-20 | End: 2022-01-27 | Stop reason: SDUPTHER

## 2021-06-01 ENCOUNTER — OFFICE VISIT (OUTPATIENT)
Dept: INTERNAL MEDICINE | Facility: CLINIC | Age: 81
End: 2021-06-01

## 2021-06-01 VITALS
DIASTOLIC BLOOD PRESSURE: 78 MMHG | HEART RATE: 71 BPM | WEIGHT: 134 LBS | BODY MASS INDEX: 26.31 KG/M2 | HEIGHT: 60 IN | SYSTOLIC BLOOD PRESSURE: 135 MMHG | OXYGEN SATURATION: 97 %

## 2021-06-01 DIAGNOSIS — R53.83 FATIGUE, UNSPECIFIED TYPE: ICD-10-CM

## 2021-06-01 DIAGNOSIS — D72.819 LEUKOPENIA, UNSPECIFIED TYPE: ICD-10-CM

## 2021-06-01 DIAGNOSIS — E03.9 HYPOTHYROIDISM, UNSPECIFIED TYPE: ICD-10-CM

## 2021-06-01 DIAGNOSIS — I10 ESSENTIAL HYPERTENSION: Primary | ICD-10-CM

## 2021-06-01 DIAGNOSIS — E55.9 VITAMIN D DEFICIENCY: ICD-10-CM

## 2021-06-01 DIAGNOSIS — J30.9 ALLERGIC RHINITIS, UNSPECIFIED SEASONALITY, UNSPECIFIED TRIGGER: ICD-10-CM

## 2021-06-01 DIAGNOSIS — R06.09 EXERTIONAL DYSPNEA: ICD-10-CM

## 2021-06-01 PROCEDURE — 93000 ELECTROCARDIOGRAM COMPLETE: CPT | Performed by: INTERNAL MEDICINE

## 2021-06-01 PROCEDURE — 71046 X-RAY EXAM CHEST 2 VIEWS: CPT | Performed by: INTERNAL MEDICINE

## 2021-06-01 PROCEDURE — 99214 OFFICE O/P EST MOD 30 MIN: CPT | Performed by: INTERNAL MEDICINE

## 2021-06-01 RX ORDER — CHOLECALCIFEROL (VITAMIN D3) 50 MCG
2000 TABLET ORAL DAILY
COMMUNITY
End: 2022-05-05

## 2021-06-01 RX ORDER — TRIAMCINOLONE ACETONIDE 55 UG/1
2 SPRAY, METERED NASAL DAILY
Qty: 60 EACH | Refills: 11 | Status: SHIPPED | OUTPATIENT
Start: 2021-06-01 | End: 2021-11-03

## 2021-06-01 NOTE — PROGRESS NOTES
"Chief Complaint   Patient presents with   • Fatigue   • Earache     sinus        History of Present Illness   Johana Corrigan is a 80 y.o. female presents for acute care. Patient reports \"exhaustion\". \"can not really think of what it is\". She goes all day long and then feels very tired. Still feels fatigue upon waking. She can fall asleep whenever she is seated. If watching the news she requires a deck of cards to stay awake. Sleeping about 7 hours a night and naturally wakes after this. She wakes once per evening to urinate. She drinks 4 cups of coffee daily and this is reduced from prior. She is drinking more water. 1-2 glasses wine 4-5 nights a week.   She is working or volunteering \"at least\" 30 hours a week.     Additional c/o right side sinus pain and pressure.     The following portions of the patient's history were reviewed and updated as appropriate: allergies, current medications, past family history, past medical history, past social history, past surgical history and problem list.  Current Outpatient Medications on File Prior to Visit   Medication Sig Dispense Refill   • azelastine (ASTELIN) 0.1 % nasal spray 2 sprays into the nostril(s) as directed by provider 2 (Two) Times a Day. Use in each nostril as directed 1 each 12   • Calcium Carbonate 1500 (600 Ca) MG tablet Take 600 mg by mouth Daily.     • cetirizine (zyrTEC) 10 MG tablet Take 1 tablet by mouth Daily. 90 tablet 1   • Cholecalciferol (Vitamin D) 50 MCG (2000 UT) tablet Take 2,000 Units by mouth Daily.     • hydroCHLOROthiazide (HYDRODIURIL) 12.5 MG tablet Take 1 tablet by mouth Daily. 30 tablet 1   • losartan (Cozaar) 50 MG tablet Take 1 tablet by mouth Daily. 90 tablet 3   • rosuvastatin (CRESTOR) 5 MG tablet Take 1 tablet by mouth Daily. 90 tablet 3   • Synthroid 75 MCG tablet Take 1 tablet by mouth Daily. 90 tablet 0   • mupirocin (Bactroban) 2 % ointment Apply  topically to the appropriate area as directed 3 (Three) Times a Day. 22 g 2   • " saccharomyces boulardii (Florastor) 250 MG capsule Take 1 capsule by mouth 2 (Two) Times a Day. 30 capsule 1   • [DISCONTINUED] cephalexin (Keflex) 500 MG capsule Take 1 capsule by mouth 2 (Two) Times a Day. 14 capsule 0   • [DISCONTINUED] doxycycline (MONODOX) 100 MG capsule Take 1 capsule by mouth 2 (Two) Times a Day. 14 capsule 0   • [DISCONTINUED] erythromycin (ROMYCIN) 5 MG/GM ophthalmic ointment Administer  to the right eye Every 4 (Four) Hours While Awake. 3.5 g 0   • [DISCONTINUED] Triamcinolone Acetonide (NASACORT) 55 MCG/ACT nasal inhaler 2 sprays into the nostril(s) as directed by provider Daily. 3 bottle 3     Current Facility-Administered Medications on File Prior to Visit   Medication Dose Route Frequency Provider Last Rate Last Admin   • levalbuterol (XOPENEX) nebulizer solution 0.63 mg  0.63 mg Nebulization Q6H PRN Sola Gómez MD         Review of Systems   Constitutional: Positive for fatigue.   HENT: Positive for ear pain and rhinorrhea.    Eyes: Negative.    Respiratory: Positive for shortness of breath.    Cardiovascular: Negative for chest pain.   Gastrointestinal: Negative.    Endocrine: Negative.    Genitourinary: Negative.    Musculoskeletal: Negative.    Neurological: Negative.    Hematological: Negative.    Psychiatric/Behavioral: Negative.        Objective   Physical Exam  Vitals and nursing note reviewed.   Constitutional:       Appearance: Normal appearance.   HENT:      Head: Normocephalic and atraumatic.      Right Ear: Tympanic membrane normal.      Left Ear: Tympanic membrane normal.      Nose: Nose normal.      Mouth/Throat:      Mouth: Mucous membranes are moist.   Eyes:      Extraocular Movements: Extraocular movements intact.      Pupils: Pupils are equal, round, and reactive to light.   Cardiovascular:      Rate and Rhythm: Normal rate and regular rhythm.      Pulses: Normal pulses.      Heart sounds: Normal heart sounds.   Pulmonary:      Effort: Pulmonary effort is normal.  "     Breath sounds: Normal breath sounds.   Abdominal:      General: Abdomen is flat.      Palpations: Abdomen is soft.   Musculoskeletal:         General: Normal range of motion.      Cervical back: Normal range of motion.   Skin:     General: Skin is warm and dry.      Comments: Healing lesions right leg x2     Neurological:      General: No focal deficit present.      Mental Status: She is alert and oriented to person, place, and time.   Psychiatric:         Mood and Affect: Mood normal.         Behavior: Behavior normal.         Thought Content: Thought content normal.         Judgment: Judgment normal.      EKG for chest pain/ shortness of air. Sinus. No st changes. Unchanged from prior.     /78   Pulse 71   Ht 152.4 cm (60\")   Wt 60.8 kg (134 lb)   SpO2 97%   BMI 26.17 kg/m²     Assessment/Plan   Diagnoses and all orders for this visit:    Essential hypertension  -     CBC & Differential  -     Comprehensive Metabolic Panel  -     Lipid Panel With LDL / HDL Ratio  -     TSH  -     Vitamin B12  -     Vitamin D 25 Hydroxy  -     Folate  -     T4, Free    Hypothyroidism, unspecified type  -     CBC & Differential  -     Comprehensive Metabolic Panel  -     Lipid Panel With LDL / HDL Ratio  -     TSH  -     Vitamin B12  -     Vitamin D 25 Hydroxy  -     Folate  -     T4, Free    Leukopenia, unspecified type  -     CBC & Differential    Fatigue, unspecified type  -     CBC & Differential  -     Comprehensive Metabolic Panel  -     Lipid Panel With LDL / HDL Ratio  -     TSH  -     Vitamin B12  -     Vitamin D 25 Hydroxy  -     Folate  -     T4, Free  -     Treadmill Stress Test; Future    Vitamin D deficiency  -     Vitamin D 25 Hydroxy    Exertional dyspnea  -     Treadmill Stress Test; Future    Allergic rhinitis, unspecified seasonality, unspecified trigger    Other orders  -     Calcium Carbonate 1500 (600 Ca) MG tablet; Take 600 mg by mouth Daily.  -     Cholecalciferol (Vitamin D) 50 MCG (2000 UT) " tablet; Take 2,000 Units by mouth Daily.  -     Triamcinolone Acetonide (NASACORT) 55 MCG/ACT nasal inhaler; 2 sprays into the nostril(s) as directed by provider Daily.      Patient w/ fatigue. Likely multifactorial w/ decreased sleep, very active lifestyle w/ at least 30 hr work weekly, sinus allergies, etc. Will ensure that she has appropriate thyroid function, normal vitamin, test for anemia, etc. Given recent reduced exercise tolerance will test a cardiac stress test. (resting ekg wnl). She is advised to go to sleep earlier in the evening/ modify sleep hygeine. Would limit wine at night as etoh reduces rem sleep. Self care emphasized. She will add nasacort for sinus allergies and continue zyrtec and astelin. Close f/u

## 2021-06-02 LAB
25(OH)D3+25(OH)D2 SERPL-MCNC: 53.7 NG/ML (ref 30–100)
ALBUMIN SERPL-MCNC: 4.5 G/DL (ref 3.5–5.2)
ALBUMIN/GLOB SERPL: 2.6 G/DL
ALP SERPL-CCNC: 63 U/L (ref 39–117)
ALT SERPL-CCNC: 19 U/L (ref 1–33)
AST SERPL-CCNC: 21 U/L (ref 1–32)
BASOPHILS # BLD AUTO: 0.04 10*3/MM3 (ref 0–0.2)
BASOPHILS NFR BLD AUTO: 0.8 % (ref 0–1.5)
BILIRUB SERPL-MCNC: 0.2 MG/DL (ref 0–1.2)
BUN SERPL-MCNC: 12 MG/DL (ref 8–23)
BUN/CREAT SERPL: 16.4 (ref 7–25)
CALCIUM SERPL-MCNC: 9.9 MG/DL (ref 8.6–10.5)
CHLORIDE SERPL-SCNC: 103 MMOL/L (ref 98–107)
CHOLEST SERPL-MCNC: 177 MG/DL (ref 0–200)
CO2 SERPL-SCNC: 28 MMOL/L (ref 22–29)
CREAT SERPL-MCNC: 0.73 MG/DL (ref 0.57–1)
EOSINOPHIL # BLD AUTO: 0.14 10*3/MM3 (ref 0–0.4)
EOSINOPHIL NFR BLD AUTO: 2.7 % (ref 0.3–6.2)
ERYTHROCYTE [DISTWIDTH] IN BLOOD BY AUTOMATED COUNT: 12.7 % (ref 12.3–15.4)
FOLATE SERPL-MCNC: 7.53 NG/ML (ref 4.78–24.2)
GLOBULIN SER CALC-MCNC: 1.7 GM/DL
GLUCOSE SERPL-MCNC: 74 MG/DL (ref 65–99)
HCT VFR BLD AUTO: 41.9 % (ref 34–46.6)
HDLC SERPL-MCNC: 76 MG/DL (ref 40–60)
HGB BLD-MCNC: 13.8 G/DL (ref 12–15.9)
IMM GRANULOCYTES # BLD AUTO: 0.01 10*3/MM3 (ref 0–0.05)
IMM GRANULOCYTES NFR BLD AUTO: 0.2 % (ref 0–0.5)
LDLC SERPL CALC-MCNC: 71 MG/DL (ref 0–100)
LDLC/HDLC SERPL: 0.86 {RATIO}
LYMPHOCYTES # BLD AUTO: 1.47 10*3/MM3 (ref 0.7–3.1)
LYMPHOCYTES NFR BLD AUTO: 28.5 % (ref 19.6–45.3)
MCH RBC QN AUTO: 31 PG (ref 26.6–33)
MCHC RBC AUTO-ENTMCNC: 32.9 G/DL (ref 31.5–35.7)
MCV RBC AUTO: 94.2 FL (ref 79–97)
MONOCYTES # BLD AUTO: 0.48 10*3/MM3 (ref 0.1–0.9)
MONOCYTES NFR BLD AUTO: 9.3 % (ref 5–12)
NEUTROPHILS # BLD AUTO: 3.01 10*3/MM3 (ref 1.7–7)
NEUTROPHILS NFR BLD AUTO: 58.5 % (ref 42.7–76)
NRBC BLD AUTO-RTO: 0 /100 WBC (ref 0–0.2)
PLATELET # BLD AUTO: 213 10*3/MM3 (ref 140–450)
POTASSIUM SERPL-SCNC: 4 MMOL/L (ref 3.5–5.2)
PROT SERPL-MCNC: 6.2 G/DL (ref 6–8.5)
RBC # BLD AUTO: 4.45 10*6/MM3 (ref 3.77–5.28)
SODIUM SERPL-SCNC: 138 MMOL/L (ref 136–145)
T4 FREE SERPL-MCNC: 1.49 NG/DL (ref 0.93–1.7)
TRIGL SERPL-MCNC: 180 MG/DL (ref 0–150)
TSH SERPL DL<=0.005 MIU/L-ACNC: 1.72 UIU/ML (ref 0.27–4.2)
VIT B12 SERPL-MCNC: 475 PG/ML (ref 211–946)
VLDLC SERPL CALC-MCNC: 30 MG/DL (ref 5–40)
WBC # BLD AUTO: 5.15 10*3/MM3 (ref 3.4–10.8)

## 2021-06-03 ENCOUNTER — TELEPHONE (OUTPATIENT)
Dept: INTERNAL MEDICINE | Facility: CLINIC | Age: 81
End: 2021-06-03

## 2021-06-03 NOTE — TELEPHONE ENCOUNTER
Pt informed      ----- Message from Sola Gómez MD sent at 6/3/2021  1:24 PM EDT -----  Please advise patient her labs look very good. No reason for fatigue detected on lab testing.   rojelio

## 2021-06-08 ENCOUNTER — APPOINTMENT (OUTPATIENT)
Dept: WOUND CARE | Facility: HOSPITAL | Age: 81
End: 2021-06-08

## 2021-06-17 ENCOUNTER — HOSPITAL ENCOUNTER (OUTPATIENT)
Dept: CARDIOLOGY | Facility: HOSPITAL | Age: 81
Discharge: HOME | End: 2021-06-17
Admitting: INTERNAL MEDICINE

## 2021-06-17 DIAGNOSIS — R53.83 FATIGUE, UNSPECIFIED TYPE: ICD-10-CM

## 2021-06-17 DIAGNOSIS — R06.09 EXERTIONAL DYSPNEA: ICD-10-CM

## 2021-06-17 LAB
BH CV STRESS BP STAGE 1: NORMAL
BH CV STRESS BP STAGE 2: NORMAL
BH CV STRESS DURATION MIN STAGE 1: 3
BH CV STRESS DURATION MIN STAGE 2: 2
BH CV STRESS DURATION SEC STAGE 1: 0
BH CV STRESS DURATION SEC STAGE 2: 0
BH CV STRESS GRADE STAGE 1: 10
BH CV STRESS GRADE STAGE 2: 12
BH CV STRESS HR STAGE 1: 115
BH CV STRESS HR STAGE 2: 130
BH CV STRESS METS STAGE 1: 5
BH CV STRESS METS STAGE 2: 7.5
BH CV STRESS PROTOCOL 1: NORMAL
BH CV STRESS RECOVERY BP: NORMAL MMHG
BH CV STRESS RECOVERY HR: 90 BPM
BH CV STRESS SPEED STAGE 1: 1.7
BH CV STRESS SPEED STAGE 2: 2.5
BH CV STRESS STAGE 1: 1
BH CV STRESS STAGE 2: 2
MAXIMAL PREDICTED HEART RATE: 140 BPM
PERCENT MAX PREDICTED HR: 94.29 %
STRESS BASELINE BP: NORMAL MMHG
STRESS BASELINE HR: 63 BPM
STRESS PERCENT HR: 111 %
STRESS POST ESTIMATED WORKLOAD: 7 METS
STRESS POST EXERCISE DUR MIN: 5 MIN
STRESS POST EXERCISE DUR SEC: 0 SEC
STRESS POST PEAK BP: NORMAL MMHG
STRESS POST PEAK HR: 132 BPM
STRESS TARGET HR: 119 BPM

## 2021-06-17 PROCEDURE — 93018 CV STRESS TEST I&R ONLY: CPT | Performed by: INTERNAL MEDICINE

## 2021-06-17 PROCEDURE — 93016 CV STRESS TEST SUPVJ ONLY: CPT | Performed by: INTERNAL MEDICINE

## 2021-06-17 PROCEDURE — 93017 CV STRESS TEST TRACING ONLY: CPT

## 2021-06-18 ENCOUNTER — TELEPHONE (OUTPATIENT)
Dept: INTERNAL MEDICINE | Facility: CLINIC | Age: 81
End: 2021-06-18

## 2021-07-01 ENCOUNTER — TELEPHONE (OUTPATIENT)
Dept: INTERNAL MEDICINE | Facility: CLINIC | Age: 81
End: 2021-07-01

## 2021-07-01 NOTE — TELEPHONE ENCOUNTER
PATIENT IS CALLING IN SHE STATES SHE WAS JUST IN TO SEE DOCTOR FEW WEEKS AGO AND SHE HAS ANOTHER APPT SCHEDULED FOR 07/06/21 SHE IS WANTING TO KNOW IF DOCTOR STILL WANTS HER TO COME IN TO THIS APPT OR NOT.    PLEASE ADVISE       CALLBACK NUMBER IS  [phone]

## 2021-08-02 RX ORDER — LEVOTHYROXINE SODIUM 75 MCG
TABLET ORAL
Qty: 90 TABLET | Refills: 0 | Status: SHIPPED | OUTPATIENT
Start: 2021-08-02 | End: 2021-11-03

## 2021-08-26 ENCOUNTER — TRANSCRIBE ORDERS (OUTPATIENT)
Dept: ORTHOPEDIC SURGERY | Facility: CLINIC | Age: 81
End: 2021-08-26

## 2021-08-26 ENCOUNTER — HOSPITAL ENCOUNTER (OUTPATIENT)
Dept: CARDIOLOGY | Facility: HOSPITAL | Age: 81
End: 2021-08-26

## 2021-08-26 DIAGNOSIS — M79.89 SWELLING OF LOWER EXTREMITY: Primary | ICD-10-CM

## 2021-08-27 ENCOUNTER — HOSPITAL ENCOUNTER (OUTPATIENT)
Dept: CARDIOLOGY | Facility: HOSPITAL | Age: 81
Discharge: HOME | End: 2021-08-27
Admitting: ORTHOPAEDIC SURGERY

## 2021-08-27 DIAGNOSIS — M79.89 SWELLING OF LOWER EXTREMITY: ICD-10-CM

## 2021-08-27 LAB
BH CV LOW VAS RIGHT LESSER SAPH VESSEL: 1
BH CV LOWER VASCULAR LEFT COMMON FEMORAL AUGMENT: NORMAL
BH CV LOWER VASCULAR LEFT COMMON FEMORAL COMPETENT: NORMAL
BH CV LOWER VASCULAR LEFT COMMON FEMORAL COMPRESS: NORMAL
BH CV LOWER VASCULAR LEFT COMMON FEMORAL PHASIC: NORMAL
BH CV LOWER VASCULAR LEFT COMMON FEMORAL SPONT: NORMAL
BH CV LOWER VASCULAR RIGHT COMMON FEMORAL AUGMENT: NORMAL
BH CV LOWER VASCULAR RIGHT COMMON FEMORAL COMPETENT: NORMAL
BH CV LOWER VASCULAR RIGHT COMMON FEMORAL COMPRESS: NORMAL
BH CV LOWER VASCULAR RIGHT COMMON FEMORAL PHASIC: NORMAL
BH CV LOWER VASCULAR RIGHT COMMON FEMORAL SPONT: NORMAL
BH CV LOWER VASCULAR RIGHT DISTAL FEMORAL COMPRESS: NORMAL
BH CV LOWER VASCULAR RIGHT GASTRONEMIUS COMPRESS: NORMAL
BH CV LOWER VASCULAR RIGHT GREATER SAPH AK COMPRESS: NORMAL
BH CV LOWER VASCULAR RIGHT GREATER SAPH BK COMPRESS: NORMAL
BH CV LOWER VASCULAR RIGHT LESSER SAPH COMPRESS: NORMAL
BH CV LOWER VASCULAR RIGHT LESSER SAPH THROMBUS: NORMAL
BH CV LOWER VASCULAR RIGHT MID FEMORAL AUGMENT: NORMAL
BH CV LOWER VASCULAR RIGHT MID FEMORAL COMPETENT: NORMAL
BH CV LOWER VASCULAR RIGHT MID FEMORAL COMPRESS: NORMAL
BH CV LOWER VASCULAR RIGHT MID FEMORAL PHASIC: NORMAL
BH CV LOWER VASCULAR RIGHT MID FEMORAL SPONT: NORMAL
BH CV LOWER VASCULAR RIGHT PERONEAL COMPRESS: NORMAL
BH CV LOWER VASCULAR RIGHT POPLITEAL AUGMENT: NORMAL
BH CV LOWER VASCULAR RIGHT POPLITEAL COMPETENT: NORMAL
BH CV LOWER VASCULAR RIGHT POPLITEAL COMPRESS: NORMAL
BH CV LOWER VASCULAR RIGHT POPLITEAL PHASIC: NORMAL
BH CV LOWER VASCULAR RIGHT POPLITEAL SPONT: NORMAL
BH CV LOWER VASCULAR RIGHT POSTERIOR TIBIAL COMPRESS: NORMAL
BH CV LOWER VASCULAR RIGHT PROFUNDA FEMORAL COMPRESS: NORMAL
BH CV LOWER VASCULAR RIGHT PROXIMAL FEMORAL COMPRESS: NORMAL
BH CV LOWER VASCULAR RIGHT SAPHENOFEMORAL JUNCTION COMPRESS: NORMAL
MAXIMAL PREDICTED HEART RATE: 140 BPM
STRESS TARGET HR: 119 BPM

## 2021-08-27 PROCEDURE — 93971 EXTREMITY STUDY: CPT

## 2021-09-14 ENCOUNTER — TELEPHONE (OUTPATIENT)
Dept: INTERNAL MEDICINE | Facility: CLINIC | Age: 81
End: 2021-09-14

## 2021-09-14 DIAGNOSIS — M79.89 MASS OF SOFT TISSUE OF RIGHT LOWER EXTREMITY: ICD-10-CM

## 2021-09-14 DIAGNOSIS — M79.89 SWELLING OF LOWER EXTREMITY: Primary | ICD-10-CM

## 2021-09-14 NOTE — TELEPHONE ENCOUNTER
"Pt called scheduling to get answerers          ----- Message from Johana Corrigan sent at 9/14/2021  2:48 PM EDT -----  Regarding: Non-Urgent Medical Question  Contact: [phone]  I am having a \"US giana guided soft tissue biopsy on my right knee area on 9/22 at 7 am.  I think 210 indicates how long it takes? Also there is a message if it is a certain type (invasive) I need to bring a .  Is this considered invasive?    "

## 2021-09-15 NOTE — PROGRESS NOTES
09/22/21 0000   Pre-Procedure Phone Call   Procedure Time Verified Yes   Arrival Time 0700   Procedure Location Verified Yes   Medical History Reviewed No   NPO Status Reinforced Yes   Ride and Caregiver Arranged Yes   Patient Knows to Bring Current Medications   (no changes in medications since last reviewed.)   Bring Outside Films Requested   (MRI outside films 9/14/21 in PACS)

## 2021-09-22 ENCOUNTER — HOSPITAL ENCOUNTER (OUTPATIENT)
Dept: ULTRASOUND IMAGING | Facility: HOSPITAL | Age: 81
Discharge: HOME | End: 2021-09-22
Admitting: ORTHOPAEDIC SURGERY

## 2021-09-22 VITALS
DIASTOLIC BLOOD PRESSURE: 62 MMHG | TEMPERATURE: 97.3 F | OXYGEN SATURATION: 98 % | HEIGHT: 61 IN | BODY MASS INDEX: 23.79 KG/M2 | RESPIRATION RATE: 18 BRPM | SYSTOLIC BLOOD PRESSURE: 116 MMHG | HEART RATE: 60 BPM | WEIGHT: 126 LBS

## 2021-09-22 DIAGNOSIS — M79.89 MASS OF SOFT TISSUE OF RIGHT LOWER EXTREMITY: ICD-10-CM

## 2021-09-22 PROCEDURE — 88112 CYTOPATH CELL ENHANCE TECH: CPT | Performed by: ORTHOPAEDIC SURGERY

## 2021-09-22 PROCEDURE — 25010000003 LIDOCAINE 1 % SOLUTION: Performed by: RADIOLOGY

## 2021-09-22 PROCEDURE — 76942 ECHO GUIDE FOR BIOPSY: CPT

## 2021-09-22 PROCEDURE — 88305 TISSUE EXAM BY PATHOLOGIST: CPT | Performed by: ORTHOPAEDIC SURGERY

## 2021-09-22 RX ORDER — LIDOCAINE HYDROCHLORIDE 10 MG/ML
10 INJECTION, SOLUTION INFILTRATION; PERINEURAL ONCE
Status: COMPLETED | OUTPATIENT
Start: 2021-09-22 | End: 2021-09-22

## 2021-09-22 RX ADMIN — LIDOCAINE HYDROCHLORIDE 5 ML: 10 INJECTION, SOLUTION INFILTRATION; PERINEURAL at 08:09

## 2021-09-22 NOTE — NURSING NOTE
Patient taken by wheelchair to patient discharge to meet her .      Protective goggles and mask in place with all patient interactions today

## 2021-09-22 NOTE — NURSING NOTE
Patient arrived for US Right Thigh Mass Biopsy.    Protective goggles and mask in place with all patient interactions today

## 2021-09-22 NOTE — H&P
Name: Johana Corrigan ADMIT: 2021   : 1940  PCP: Sola Gómez MD    MRN: [id number] LOS: 0 days   AGE/SEX: 80 y.o. female  ROOM: Room/bed info not found       Chief complaint   Patient is a 80 y.o. female presents with swelling.     Past Surgical History:  Past Surgical History:   Procedure Laterality Date   • BRONCHOSCOPY N/A 2018    Procedure: BRONCHOSCOPY WITH;  Surgeon: Gee Kenney MD;  Location: [facility] ENDOSCOPY;  Service: Pulmonary   • NOSE SURGERY     • SUBMANDIBULAR GLAND EXCISION     • TONSILLECTOMY     • TUBAL ABDOMINAL LIGATION         Past Medical History:  Past Medical History:   Diagnosis Date   • Allergic    • Arthritis    • Cataract    • Dyspnea    • Headache    • Hyperlipidemia    • Hyperthyroidism    • Leukopenia    • Osteoarthritis        Home Medications:  (Not in a hospital admission)      Allergies:  Patient has no known allergies.    Family History:  Family History   Problem Relation Age of Onset   • Heart disease Father    • Heart disease Brother    • Heart disease Mother        Social History:  Social History     Tobacco Use   • Smoking status: Former Smoker   • Smokeless tobacco: Never Used   Substance Use Topics   • Alcohol use: Yes     Alcohol/week: 1.0 standard drinks     Types: 1 Glasses of wine per week     Comment: daily   • Drug use: No        Objective     Physical Exam:    unremarkable for intended procedure    Vital Signs  Temp:  [97.3 °F (36.3 °C)] 97.3 °F (36.3 °C)  Heart Rate:  [66] 66  Resp:  [18] 18  BP: (110)/(65) 110/65    Anticipated Surgical Procedure:  bx/aspir    The risks, benefits and alternatives of this procedure have been discussed with the patient or responsible party: Yes        Mayank Fairchild MD  21  07:25 EDT

## 2021-09-22 NOTE — DISCHARGE INSTRUCTIONS
EDUCATION /DISCHARGE INSTRUCTIONS  CT/US guided biopsy:  A biopsy is a procedure done to remove tissue for further analysis.  Before images are taken to locate the target area.  Images can be obtained using ultrasound, CT or MRI.  A physician will clean your skin with antiseptic soap, place a sterile towel around the site and administer a local anesthetic to numb the area.  The physician will then insert a special needle.  Sometimes images are taken of the needle after it is inserted to ensure the needle is in the correct area to be biopsied.   A sample is obtained and sent to the laboratory for study.  Occasionally the laboratory is unable to make a diagnosis from the sample and the procedure may need to be repeated.  Within a week the radiologist will send a report to your physician.  A pathologist will also examine the tissue and send a report.    Risks of the procedure include but are not limited to:   *  Bleeding    *  Infection   *  Puncture of surrounding organs *  Death     *  Lung collapse if the biopsy is near the chest which may require insertion of a      chest tube to re-inflate the lung if severe.    Benefits of the procedure:  Using x-ray helps to locate the area that requires a biopsy. The procedure is less invasive than a surgical procedure, there are no large incisions and it does not require anesthesia.    Alternatives to the procedure:  A biopsy can be performed surgically.  Risks of a surgical biopsy include exposure to anesthesia, infection, excessive bleeding and injury to abdominal organs.  A benefit of surgical biopsy is the ability to see the area to be biopsied and remove of a larger piece of tissue.    THIS EDUCATION INFORMATION WAS REVIEWED PRIOR TO PROCEDURE AND CONSENT. Patient initials__________________Time___________________    Post Procedure:    *  Expect the biopsy site may be tender up to one week.    *  Rest today (no pushing pulling or straining).   *  Slowly increase activity  tomorrow.    *  If you received sedation do not drive for 24 hours.   *  Keep dressing clean and dry.   *  Leave dressing on puncture site for 24 hours.    *  You may shower when dressing removed.  Call your doctor if experiencing:   *  Signs of infection such as redness, swelling, excessive pain and / or foul        smelling drainage from the puncture site.   *  Chills or fever over 101 degrees (by mouth).   *  Unrelieved pain.   *  Any new or severe symptoms.   *  If experiencing sudden / severe shortness of breath or chest pain go to the       nearest emergency room.   Following the procedure:     Follow-up with the ordering physician as directed.    Continue to take other medications as directed by your physician unless    otherwise instructed.     If you have any concerns please call the Radiology Nurses Desk at [phone] 7am-10pm  You are the most important factor in your recovery.  Follow the above instructions carefully.

## 2021-09-23 ENCOUNTER — TELEPHONE (OUTPATIENT)
Dept: INTERVENTIONAL RADIOLOGY/VASCULAR | Facility: HOSPITAL | Age: 81
End: 2021-09-23

## 2021-09-23 LAB
CYTO UR: NORMAL
CYTO UR: NORMAL
LAB AP CASE REPORT: NORMAL
LAB AP CASE REPORT: NORMAL
LAB AP CLINICAL INFORMATION: NORMAL
PATH REPORT.FINAL DX SPEC: NORMAL
PATH REPORT.FINAL DX SPEC: NORMAL
PATH REPORT.GROSS SPEC: NORMAL
PATH REPORT.GROSS SPEC: NORMAL

## 2021-10-04 ENCOUNTER — OFFICE VISIT (OUTPATIENT)
Dept: INTERNAL MEDICINE | Facility: CLINIC | Age: 81
End: 2021-10-04

## 2021-10-04 VITALS
WEIGHT: 135 LBS | BODY MASS INDEX: 25.49 KG/M2 | DIASTOLIC BLOOD PRESSURE: 64 MMHG | HEIGHT: 61 IN | HEART RATE: 73 BPM | SYSTOLIC BLOOD PRESSURE: 145 MMHG

## 2021-10-04 DIAGNOSIS — M79.89 NODULE OF SOFT TISSUE: ICD-10-CM

## 2021-10-04 DIAGNOSIS — J30.9 ALLERGIC RHINITIS, UNSPECIFIED SEASONALITY, UNSPECIFIED TRIGGER: Primary | ICD-10-CM

## 2021-10-04 DIAGNOSIS — R05.9 COUGH: ICD-10-CM

## 2021-10-04 PROCEDURE — G0008 ADMIN INFLUENZA VIRUS VAC: HCPCS | Performed by: INTERNAL MEDICINE

## 2021-10-04 PROCEDURE — 90662 IIV NO PRSV INCREASED AG IM: CPT | Performed by: INTERNAL MEDICINE

## 2021-10-04 PROCEDURE — 99214 OFFICE O/P EST MOD 30 MIN: CPT | Performed by: INTERNAL MEDICINE

## 2021-10-04 PROCEDURE — 71046 X-RAY EXAM CHEST 2 VIEWS: CPT | Performed by: INTERNAL MEDICINE

## 2021-10-04 NOTE — PROGRESS NOTES
"Chief Complaint   Patient presents with   • Shortness of Breath   • Breast Mass     right leg       History of Present Illness   Johana Corrigan is a 80 y.o. female presents for acute needs and f/u evaluation. Patient in general is doing well. She does have a soft tissue nodule of her right lower extremity. Biopsied and negative for cancerous process but is uncomfortable. She would like to have this removed.   She does complain of a \"rattling\"\" in her chest. Left side in nature. Not noted throughout the day but present after lying down at night.   She is taking zyrtec v azelastine in the morning \"whichever one I can grab\".     The following portions of the patient's history were reviewed and updated as appropriate: allergies, current medications, past family history, past medical history, past social history, past surgical history and problem list.  Current Outpatient Medications on File Prior to Visit   Medication Sig Dispense Refill   • azelastine (ASTELIN) 0.1 % nasal spray 2 sprays into the nostril(s) as directed by provider 2 (Two) Times a Day. Use in each nostril as directed 1 each 12   • Calcium Carbonate 1500 (600 Ca) MG tablet Take 600 mg by mouth Daily.     • cetirizine (zyrTEC) 10 MG tablet Take 1 tablet by mouth Daily. 90 tablet 1   • Cholecalciferol (Vitamin D) 50 MCG (2000 UT) tablet Take 2,000 Units by mouth Daily.     • hydroCHLOROthiazide (HYDRODIURIL) 12.5 MG tablet Take 1 tablet by mouth Daily. 30 tablet 1   • losartan (Cozaar) 50 MG tablet Take 1 tablet by mouth Daily. 90 tablet 3   • mupirocin (Bactroban) 2 % ointment Apply  topically to the appropriate area as directed 3 (Three) Times a Day. 22 g 2   • rosuvastatin (CRESTOR) 5 MG tablet Take 1 tablet by mouth Daily. 90 tablet 3   • saccharomyces boulardii (Florastor) 250 MG capsule Take 1 capsule by mouth 2 (Two) Times a Day. 30 capsule 1   • Synthroid 75 MCG tablet TAKE 1 TABLET EVERY DAY 90 tablet 0   • Triamcinolone Acetonide (NASACORT) 55 " MCG/ACT nasal inhaler 2 sprays into the nostril(s) as directed by provider Daily. 60 each 11     Current Facility-Administered Medications on File Prior to Visit   Medication Dose Route Frequency Provider Last Rate Last Admin   • levalbuterol (XOPENEX) nebulizer solution 0.63 mg  0.63 mg Nebulization Q6H PRN Sola Gómez MD         Review of Systems   Constitutional: Negative.    HENT: Positive for postnasal drip, rhinorrhea and sore throat.    Eyes: Negative.    Respiratory: Positive for cough.    Cardiovascular: Negative.    Gastrointestinal: Negative.    Endocrine: Negative.    Genitourinary: Negative.    Musculoskeletal: Negative.    Skin: Negative.    Allergic/Immunologic: Negative.    Neurological: Negative.    Hematological: Negative.    Psychiatric/Behavioral: Negative.        Objective   Physical Exam  Vitals and nursing note reviewed.   Constitutional:       Appearance: Normal appearance.   HENT:      Head: Normocephalic and atraumatic.      Right Ear: Tympanic membrane normal.      Left Ear: Tympanic membrane normal.      Nose: Nose normal.      Mouth/Throat:      Mouth: Mucous membranes are moist.   Eyes:      Extraocular Movements: Extraocular movements intact.      Pupils: Pupils are equal, round, and reactive to light.   Cardiovascular:      Rate and Rhythm: Normal rate and regular rhythm.      Pulses: Normal pulses.      Heart sounds: Normal heart sounds.   Pulmonary:      Effort: Pulmonary effort is normal.      Breath sounds: Normal breath sounds.   Abdominal:      General: Abdomen is flat.      Palpations: Abdomen is soft.   Musculoskeletal:         General: Normal range of motion.      Cervical back: Normal range of motion.   Skin:     General: Skin is warm and dry.      Comments: Large subcutaneous nodule posterior thigh right lower extremity   Neurological:      General: No focal deficit present.      Mental Status: She is alert and oriented to person, place, and time.   Psychiatric:          "Mood and Affect: Mood normal.         Behavior: Behavior normal.         Thought Content: Thought content normal.         Judgment: Judgment normal.          /64   Pulse 73   Ht 154.9 cm (61\")   Wt 61.2 kg (135 lb)   BMI 25.51 kg/m²     Assessment/Plan   Diagnoses and all orders for this visit:    Allergic rhinitis, unspecified seasonality, unspecified trigger    Cough    Nodule of soft tissue    Other orders  -     Fluzone High-Dose 65+yrs ([id number])    patient w/ soft tissue nodule right lower extremity. It continues to increase in size. It is uncomfortable and although biopsy is negative it is rapidly increasing in size. Would like to have this evaluated for possible excision. She came in for \"rattling in the lungs\". She is clear to auscultation. cxr is normal. Suspect postnasal drainage in origin and will move sinus allergy therapy to evening dosing and be vigilant w/ both nasal antihistamine and oral. She will follow up here routinely.              "

## 2021-10-18 ENCOUNTER — OFFICE VISIT (OUTPATIENT)
Dept: SURGERY | Facility: CLINIC | Age: 81
End: 2021-10-18

## 2021-10-18 VITALS — WEIGHT: 133 LBS | HEIGHT: 61 IN | BODY MASS INDEX: 25.11 KG/M2

## 2021-10-18 DIAGNOSIS — R22.41 MASS OF RIGHT LOWER EXTREMITY: Primary | ICD-10-CM

## 2021-10-18 PROCEDURE — 99203 OFFICE O/P NEW LOW 30 MIN: CPT | Performed by: SURGERY

## 2021-10-18 RX ORDER — CEFAZOLIN SODIUM 2 G/100ML
2 INJECTION, SOLUTION INTRAVENOUS ONCE
Status: CANCELLED | OUTPATIENT
Start: 2021-11-05 | End: 2021-10-18

## 2021-10-20 NOTE — PROGRESS NOTES
SUMMARY (A/P):    80-year-old lady with right knee left posterior medial soft tissue mass.  Lipomatous mass is certainly most likely on the differential, although it is a bit more firm than I would expect for simple lipoma.  Previous biopsy benign.  MRI in August was read as showing a pattern of lipedema.  Given that the area has enlarged and given the current palpable characteristics, I think the most reasonable approach now would be excision and she has scheduled accordingly.      CC:    Right leg mass    HPI:    80-year-old lady with 4-month history of right posterior medial knee soft tissue mass.  MRI was performed on 8/17/2021 and was read as showing a pattern of lipedema.    ENDOSCOPY:   • Colonoscopy 2015-normal    RADIOLOGY:   • MRI 8/17/2021: Pattern of lipedema  • Duplex scan right lower extremity 8/27/2021: Normal right lower extremity venous duplex scan  • Ultrasound-guided biopsy 9/22/2021 described oval heterogeneous hypodense subcutaneous lesion and tissue biopsies were grossly described as fatty appearing tissue.    LABS:    • Pathology 9/22/2021: Fibroinflammatory debris and erythrocytes  • Cytology 9/22/2021: Negative for malignant cells    PREVIOUS ABDOMINAL SURGERY    • None    PMH:    • Seasonal allergies  • Arthritis  • Hyperlipidemia  • Hypothyroidism    ALLERGIES:   • None    MEDICATIONS:   • Astelin  • Hydrochlorothiazide  • Losartan  • Crestor  • Synthroid    FAMILY HISTORY:    • Colorectal cancer: Negative    SOCIAL HISTORY:   • Denies tobacco use  • Occasional alcohol use    PHYSICAL EXAM:   • Constitutional: Well-developed well-nourished, no acute distress  • Vital signs: Weight 133 pounds, height 61 inches, BMI is 25.1  • Respiratory: Clear to auscultation, normal inspiratory effort  • Cardiovascular: Regular rate.  No peripheral edema in either lower extremity.  • Lymphatics (palpable nodes):  cervical-negative, inguinal-negative  • Skin:  Warm, dry, no rash on visualized skin  surfaces  • Musculoskeletal: Posterior medial to the right knee is a roughly 8 cm firm seemingly subcutaneous soft tissue mass which is freely movable.  • Psychiatric: Alert and oriented ×3, normal affect     ROS:    No cough, chest pain, shortness of air.  All other systems reviewed and negative other than presenting complaints.    SONIA HIRSCH M.D.

## 2021-11-03 ENCOUNTER — PRE-ADMISSION TESTING (OUTPATIENT)
Dept: PREADMISSION TESTING | Facility: HOSPITAL | Age: 81
End: 2021-11-03

## 2021-11-03 VITALS
SYSTOLIC BLOOD PRESSURE: 135 MMHG | TEMPERATURE: 97.4 F | HEIGHT: 61 IN | OXYGEN SATURATION: 98 % | BODY MASS INDEX: 25.11 KG/M2 | WEIGHT: 133 LBS | RESPIRATION RATE: 20 BRPM | DIASTOLIC BLOOD PRESSURE: 83 MMHG | HEART RATE: 64 BPM

## 2021-11-03 DIAGNOSIS — R22.41 MASS OF RIGHT LOWER EXTREMITY: ICD-10-CM

## 2021-11-03 LAB
ANION GAP SERPL CALCULATED.3IONS-SCNC: 8.8 MMOL/L (ref 5–15)
BUN SERPL-MCNC: 9 MG/DL (ref 8–23)
BUN/CREAT SERPL: 12.5 (ref 7–25)
CALCIUM SPEC-SCNC: 9.6 MG/DL (ref 8.6–10.5)
CHLORIDE SERPL-SCNC: 106 MMOL/L (ref 98–107)
CO2 SERPL-SCNC: 26.2 MMOL/L (ref 22–29)
CREAT SERPL-MCNC: 0.72 MG/DL (ref 0.57–1)
DEPRECATED RDW RBC AUTO: 43.8 FL (ref 37–54)
ERYTHROCYTE [DISTWIDTH] IN BLOOD BY AUTOMATED COUNT: 12.5 % (ref 12.3–15.4)
GFR SERPL CREATININE-BSD FRML MDRD: 78 ML/MIN/1.73
GLUCOSE SERPL-MCNC: 85 MG/DL (ref 65–99)
HCT VFR BLD AUTO: 41.3 % (ref 34–46.6)
HGB BLD-MCNC: 13.5 G/DL (ref 12–15.9)
MCH RBC QN AUTO: 30.8 PG (ref 26.6–33)
MCHC RBC AUTO-ENTMCNC: 32.7 G/DL (ref 31.5–35.7)
MCV RBC AUTO: 94.1 FL (ref 79–97)
PLATELET # BLD AUTO: 204 10*3/MM3 (ref 140–450)
PMV BLD AUTO: 9.6 FL (ref 6–12)
POTASSIUM SERPL-SCNC: 3.7 MMOL/L (ref 3.5–5.2)
QT INTERVAL: 459 MS
RBC # BLD AUTO: 4.39 10*6/MM3 (ref 3.77–5.28)
SARS-COV-2 ORF1AB RESP QL NAA+PROBE: NOT DETECTED
SODIUM SERPL-SCNC: 141 MMOL/L (ref 136–145)
WBC # BLD AUTO: 4.62 10*3/MM3 (ref 3.4–10.8)

## 2021-11-03 PROCEDURE — 93010 ELECTROCARDIOGRAM REPORT: CPT | Performed by: INTERNAL MEDICINE

## 2021-11-03 PROCEDURE — 85027 COMPLETE CBC AUTOMATED: CPT

## 2021-11-03 PROCEDURE — U0004 COV-19 TEST NON-CDC HGH THRU: HCPCS

## 2021-11-03 PROCEDURE — 93005 ELECTROCARDIOGRAM TRACING: CPT

## 2021-11-03 PROCEDURE — 80048 BASIC METABOLIC PNL TOTAL CA: CPT

## 2021-11-03 PROCEDURE — C9803 HOPD COVID-19 SPEC COLLECT: HCPCS

## 2021-11-03 PROCEDURE — 36415 COLL VENOUS BLD VENIPUNCTURE: CPT

## 2021-11-03 RX ORDER — CHLORHEXIDINE GLUCONATE 500 MG/1
1 CLOTH TOPICAL
COMMUNITY
End: 2021-11-05 | Stop reason: HOSPADM

## 2021-11-03 RX ORDER — LEVOTHYROXINE SODIUM 75 MCG
TABLET ORAL
Qty: 90 TABLET | Refills: 0 | Status: SHIPPED | OUTPATIENT
Start: 2021-11-03 | End: 2022-03-29 | Stop reason: SDUPTHER

## 2021-11-03 RX ORDER — IBUPROFEN 200 MG
200 TABLET ORAL EVERY 6 HOURS PRN
COMMUNITY
End: 2021-12-17

## 2021-11-03 NOTE — DISCHARGE INSTRUCTIONS
Take the following medications the morning of surgery:    Thyroid med    If you are on prescription narcotic pain medication to control your pain you may also take that medication the morning of surgery.    General Instructions:  • Do not eat solid food after midnight the night before surgery.  • You may drink clear liquids day of surgery but must stop at least one hour before your hospital arrival time.  • It is beneficial for you to have a clear drink that contains carbohydrates the day of surgery.  We suggest a 12 to 20 ounce bottle of Gatorade or Powerade for non-diabetic patients or a 12 to 20 ounce bottle of G2 or Powerade Zero for diabetic patients. (Pediatric patients, are not advised to drink a 12 to 20 ounce carbohydrate drink)    Clear liquids are liquids you can see through.  Nothing red in color.     Plain water                               Sports drinks  Sodas                                   Gelatin (Jell-O)  Fruit juices without pulp such as white grape juice and apple juice  Popsicles that contain no fruit or yogurt  Tea or coffee (no cream or milk added)  Gatorade / Powerade  G2 / Powerade Zero    • Infants may have breast milk up to four hours before surgery.  • Infants drinking formula may drink formula up to six hours before surgery.   • Patients who avoid smoking, chewing tobacco and alcohol for 4 weeks prior to surgery have a reduced risk of post-operative complications.  Quit smoking as many days before surgery as you can.  • Do not smoke, use chewing tobacco or drink alcohol the day of surgery.   • If applicable bring your C-PAP/ BI-PAP machine.  • Bring any papers given to you in the doctor’s office.  • Wear clean comfortable clothes.  • Do not wear contact lenses, false eyelashes or make-up.  Bring a case for your glasses.   • Bring crutches or walker if applicable.  • Remove all piercings.  Leave jewelry and any other valuables at home.  • Hair extensions with metal clips must be  removed prior to surgery.  • The Pre-Admission Testing nurse will instruct you to bring medications if unable to obtain an accurate list in Pre-Admission Testing.        If you were given a blood bank ID arm band remember to bring it with you the day of surgery.    Preventing a Surgical Site Infection:  • For 2 to 3 days before surgery, avoid shaving with a razor because the razor can irritate skin and make it easier to develop an infection.    • Any areas of open skin can increase the risk of a post-operative wound infection by allowing bacteria to enter and travel throughout the body.  Notify your surgeon if you have any skin wounds / rashes even if it is not near the expected surgical site.  The area will need assessed to determine if surgery should be delayed until it is healed.  • The night prior to surgery shower using a fresh bar of anti-bacterial soap (such as Dial) and clean washcloth.  Sleep in a clean bed with clean clothing.  Do not allow pets to sleep with you.  • Shower on the morning of surgery using a fresh bar of anti-bacterial soap (such as Dial) and clean washcloth.  Dry with a clean towel and dress in clean clothing.  • Ask your surgeon if you will be receiving antibiotics prior to surgery.  • Make sure you, your family, and all healthcare providers clean their hands with soap and water or an alcohol based hand  before caring for you or your wound.    Day of surgery:11/5/2021   0630  Your arrival time is approximately two hours before your scheduled surgery time.  Upon arrival, a Pre-op nurse and Anesthesiologist will review your health history, obtain vital signs, and answer questions you may have.  The only belongings needed at this time will be a list of your home medications and if applicable your C-PAP/BI-PAP machine.  A Pre-op nurse will start an IV and you may receive medication in preparation for surgery, including something to help you relax.     Please be aware that surgery  does come with discomfort.  We want to make every effort to control your discomfort so please discuss any uncontrolled symptoms with your nurse.   Your doctor will most likely have prescribed pain medications.      If you are going home after surgery you will receive individualized written care instructions before being discharged.  A responsible adult must drive you to and from the hospital on the day of your surgery and stay with you for 24 hours.  Discharge prescriptions can be filled by the hospital pharmacy during regular pharmacy hours.  If you are having surgery late in the day/evening your prescription may be e-prescribed to your pharmacy.  Please verify your pharmacy hours or chose a 24 hour pharmacy to avoid not having access to your prescription because your pharmacy has closed for the day.    If you are staying overnight following surgery, you will be transported to your hospital room following the recovery period.  [facility] has all private rooms.    If you have any questions please call Pre-Admission Testing at [phone].  Deductibles and co-payments are collected on the day of service. Please be prepared to pay the required co-pay, deductible or deposit on the day of service as defined by your plan.    Patient Education for Self-Quarantine Process    • Following your COVID testing, we strongly recommend that you wear a mask when you are with other people and practice social distancing.   • Limit your activities to only required outings.  • Wash your hands with soap and water frequently for at least 20 seconds.   • Avoid touching your eyes, nose and mouth with unwashed hands.  • Do not share anything - utensils, drinking glasses, food from the same bowl.   • Sanitize household surfaces daily. Include all high touch areas (door handles, light switches, phones, countertops, etc.)    Call your surgeon immediately if you experience any of the following symptoms:  • Sore  Throat  • Shortness of Breath or difficulty breathing  • Cough  • Chills  • Body soreness or muscle pain  • Headache  • Fever  • New loss of taste or smell  • Do not arrive for your surgery ill.  Your procedure will need to be rescheduled to another time.  You will need to call your physician before the day of surgery to avoid any unnecessary exposure to hospital staff as well as other patients.    CHLORHEXIDINE CLOTH INSTRUCTIONS  The morning of surgery follow these instructions using the Chlorhexidine cloths you've been given.  These steps reduce bacteria on the body.  Do not use the cloths near your eyes, ears mouth, genitalia or on open wounds.  Throw the cloths away after use but do not try to flush them down a toilet.      • Open and remove one cloth at a time from the package.    • Leave the cloth unfolded and begin the bathing.  • Massage the skin with the cloths using gentle pressure to remove bacteria.  Do not scrub harshly.   • Follow the steps below with one 2% CHG cloth per area (6 total cloths).  • One cloth for neck, shoulders and chest.  • One cloth for both arms, hands, fingers and underarms (do underarms last).  • One cloth for the abdomen followed by groin.  • One cloth for right leg and foot including between the toes.  • One cloth for left leg and foot including between the toes.  • The last cloth is to be used for the back of the neck, back and buttocks.    Allow the CHG to air dry 3 minutes on the skin which will give it time to work and decrease the chance of irritation.  The skin may feel sticky until it is dry.  Do not rinse with water or any other liquid or you will lose the beneficial effects of the CHG.  If mild skin irritation occurs, do rinse the skin to remove the CHG.  Report this to the nurse at time of admission.  Do not apply lotions, creams, ointments, deodorants or perfumes after using the clothes. Dress in clean clothes before coming to the hospital.

## 2021-11-05 ENCOUNTER — ANESTHESIA (OUTPATIENT)
Dept: PERIOP | Facility: HOSPITAL | Age: 81
End: 2021-11-05

## 2021-11-05 ENCOUNTER — HOSPITAL ENCOUNTER (OUTPATIENT)
Facility: HOSPITAL | Age: 81
Discharge: HOME | End: 2021-11-05
Attending: SURGERY | Admitting: SURGERY

## 2021-11-05 ENCOUNTER — ANESTHESIA EVENT (OUTPATIENT)
Dept: PERIOP | Facility: HOSPITAL | Age: 81
End: 2021-11-05

## 2021-11-05 VITALS
TEMPERATURE: 97.7 F | DIASTOLIC BLOOD PRESSURE: 61 MMHG | OXYGEN SATURATION: 95 % | SYSTOLIC BLOOD PRESSURE: 126 MMHG | HEART RATE: 77 BPM | RESPIRATION RATE: 18 BRPM

## 2021-11-05 DIAGNOSIS — R22.41 MASS OF RIGHT LOWER EXTREMITY: ICD-10-CM

## 2021-11-05 PROCEDURE — 25010000002 PROPOFOL 10 MG/ML EMULSION: Performed by: NURSE ANESTHETIST, CERTIFIED REGISTERED

## 2021-11-05 PROCEDURE — 27632 EXC LEG/ANKLE LES SC 3 CM/>: CPT | Performed by: SURGERY

## 2021-11-05 PROCEDURE — 88307 TISSUE EXAM BY PATHOLOGIST: CPT | Performed by: SURGERY

## 2021-11-05 PROCEDURE — 0 CEFAZOLIN IN DEXTROSE 2-4 GM/100ML-% SOLUTION: Performed by: SURGERY

## 2021-11-05 RX ORDER — SODIUM CHLORIDE, SODIUM LACTATE, POTASSIUM CHLORIDE, CALCIUM CHLORIDE 600; 310; 30; 20 MG/100ML; MG/100ML; MG/100ML; MG/100ML
9 INJECTION, SOLUTION INTRAVENOUS CONTINUOUS
Status: DISCONTINUED | OUTPATIENT
Start: 2021-11-05 | End: 2021-11-05 | Stop reason: HOSPADM

## 2021-11-05 RX ORDER — SODIUM CHLORIDE 0.9 % (FLUSH) 0.9 %
3-10 SYRINGE (ML) INJECTION AS NEEDED
Status: DISCONTINUED | OUTPATIENT
Start: 2021-11-05 | End: 2021-11-05 | Stop reason: HOSPADM

## 2021-11-05 RX ORDER — PROPOFOL 10 MG/ML
VIAL (ML) INTRAVENOUS CONTINUOUS PRN
Status: DISCONTINUED | OUTPATIENT
Start: 2021-11-05 | End: 2021-11-05 | Stop reason: SURG

## 2021-11-05 RX ORDER — BUPIVACAINE HYDROCHLORIDE AND EPINEPHRINE 5; 5 MG/ML; UG/ML
INJECTION, SOLUTION EPIDURAL; INTRACAUDAL; PERINEURAL AS NEEDED
Status: DISCONTINUED | OUTPATIENT
Start: 2021-11-05 | End: 2021-11-05 | Stop reason: HOSPADM

## 2021-11-05 RX ORDER — LABETALOL HYDROCHLORIDE 5 MG/ML
5 INJECTION, SOLUTION INTRAVENOUS
Status: DISCONTINUED | OUTPATIENT
Start: 2021-11-05 | End: 2021-11-05 | Stop reason: HOSPADM

## 2021-11-05 RX ORDER — PROPOFOL 10 MG/ML
VIAL (ML) INTRAVENOUS AS NEEDED
Status: DISCONTINUED | OUTPATIENT
Start: 2021-11-05 | End: 2021-11-05 | Stop reason: SURG

## 2021-11-05 RX ORDER — FLUMAZENIL 0.1 MG/ML
0.2 INJECTION INTRAVENOUS AS NEEDED
Status: DISCONTINUED | OUTPATIENT
Start: 2021-11-05 | End: 2021-11-05 | Stop reason: HOSPADM

## 2021-11-05 RX ORDER — CEFAZOLIN SODIUM 2 G/100ML
2 INJECTION, SOLUTION INTRAVENOUS ONCE
Status: COMPLETED | OUTPATIENT
Start: 2021-11-05 | End: 2021-11-05

## 2021-11-05 RX ORDER — FENTANYL CITRATE 50 UG/ML
50 INJECTION, SOLUTION INTRAMUSCULAR; INTRAVENOUS
Status: DISCONTINUED | OUTPATIENT
Start: 2021-11-05 | End: 2021-11-05 | Stop reason: HOSPADM

## 2021-11-05 RX ORDER — ONDANSETRON 4 MG/1
4 TABLET, FILM COATED ORAL EVERY 6 HOURS PRN
Qty: 10 TABLET | Refills: 1 | Status: SHIPPED | OUTPATIENT
Start: 2021-11-05 | End: 2021-11-11

## 2021-11-05 RX ORDER — ONDANSETRON 2 MG/ML
4 INJECTION INTRAMUSCULAR; INTRAVENOUS ONCE AS NEEDED
Status: DISCONTINUED | OUTPATIENT
Start: 2021-11-05 | End: 2021-11-05 | Stop reason: HOSPADM

## 2021-11-05 RX ORDER — DIPHENHYDRAMINE HYDROCHLORIDE 50 MG/ML
12.5 INJECTION INTRAMUSCULAR; INTRAVENOUS
Status: DISCONTINUED | OUTPATIENT
Start: 2021-11-05 | End: 2021-11-05 | Stop reason: HOSPADM

## 2021-11-05 RX ORDER — DIPHENHYDRAMINE HCL 25 MG
25 CAPSULE ORAL
Status: DISCONTINUED | OUTPATIENT
Start: 2021-11-05 | End: 2021-11-05 | Stop reason: HOSPADM

## 2021-11-05 RX ORDER — LIDOCAINE HYDROCHLORIDE AND EPINEPHRINE 10; 10 MG/ML; UG/ML
INJECTION, SOLUTION INFILTRATION; PERINEURAL AS NEEDED
Status: DISCONTINUED | OUTPATIENT
Start: 2021-11-05 | End: 2021-11-05 | Stop reason: HOSPADM

## 2021-11-05 RX ORDER — EPHEDRINE SULFATE 50 MG/ML
5 INJECTION, SOLUTION INTRAVENOUS ONCE AS NEEDED
Status: DISCONTINUED | OUTPATIENT
Start: 2021-11-05 | End: 2021-11-05 | Stop reason: HOSPADM

## 2021-11-05 RX ORDER — PROMETHAZINE HYDROCHLORIDE 25 MG/1
25 TABLET ORAL ONCE AS NEEDED
Status: DISCONTINUED | OUTPATIENT
Start: 2021-11-05 | End: 2021-11-05 | Stop reason: HOSPADM

## 2021-11-05 RX ORDER — HYDRALAZINE HYDROCHLORIDE 20 MG/ML
5 INJECTION INTRAMUSCULAR; INTRAVENOUS
Status: DISCONTINUED | OUTPATIENT
Start: 2021-11-05 | End: 2021-11-05 | Stop reason: HOSPADM

## 2021-11-05 RX ORDER — FAMOTIDINE 10 MG/ML
20 INJECTION, SOLUTION INTRAVENOUS ONCE
Status: COMPLETED | OUTPATIENT
Start: 2021-11-05 | End: 2021-11-05

## 2021-11-05 RX ORDER — LIDOCAINE HYDROCHLORIDE 10 MG/ML
0.5 INJECTION, SOLUTION EPIDURAL; INFILTRATION; INTRACAUDAL; PERINEURAL ONCE AS NEEDED
Status: DISCONTINUED | OUTPATIENT
Start: 2021-11-05 | End: 2021-11-05 | Stop reason: HOSPADM

## 2021-11-05 RX ORDER — HYDROCODONE BITARTRATE AND ACETAMINOPHEN 7.5; 325 MG/1; MG/1
1 TABLET ORAL ONCE AS NEEDED
Status: DISCONTINUED | OUTPATIENT
Start: 2021-11-05 | End: 2021-11-05 | Stop reason: HOSPADM

## 2021-11-05 RX ORDER — HYDROMORPHONE HYDROCHLORIDE 1 MG/ML
0.5 INJECTION, SOLUTION INTRAMUSCULAR; INTRAVENOUS; SUBCUTANEOUS
Status: DISCONTINUED | OUTPATIENT
Start: 2021-11-05 | End: 2021-11-05 | Stop reason: HOSPADM

## 2021-11-05 RX ORDER — OXYCODONE AND ACETAMINOPHEN 7.5; 325 MG/1; MG/1
2 TABLET ORAL EVERY 4 HOURS PRN
Status: DISCONTINUED | OUTPATIENT
Start: 2021-11-05 | End: 2021-11-05 | Stop reason: HOSPADM

## 2021-11-05 RX ORDER — SODIUM CHLORIDE 0.9 % (FLUSH) 0.9 %
3 SYRINGE (ML) INJECTION EVERY 12 HOURS SCHEDULED
Status: DISCONTINUED | OUTPATIENT
Start: 2021-11-05 | End: 2021-11-05 | Stop reason: HOSPADM

## 2021-11-05 RX ORDER — NALOXONE HCL 0.4 MG/ML
0.2 VIAL (ML) INJECTION AS NEEDED
Status: DISCONTINUED | OUTPATIENT
Start: 2021-11-05 | End: 2021-11-05 | Stop reason: HOSPADM

## 2021-11-05 RX ORDER — LIDOCAINE HYDROCHLORIDE 20 MG/ML
INJECTION, SOLUTION INFILTRATION; PERINEURAL AS NEEDED
Status: DISCONTINUED | OUTPATIENT
Start: 2021-11-05 | End: 2021-11-05 | Stop reason: SURG

## 2021-11-05 RX ORDER — HYDROCODONE BITARTRATE AND ACETAMINOPHEN 5; 325 MG/1; MG/1
TABLET ORAL
Qty: 24 TABLET | Refills: 0 | Status: SHIPPED | OUTPATIENT
Start: 2021-11-05 | End: 2021-11-11

## 2021-11-05 RX ORDER — MAGNESIUM HYDROXIDE 1200 MG/15ML
LIQUID ORAL AS NEEDED
Status: DISCONTINUED | OUTPATIENT
Start: 2021-11-05 | End: 2021-11-05 | Stop reason: HOSPADM

## 2021-11-05 RX ORDER — PROMETHAZINE HYDROCHLORIDE 25 MG/1
25 SUPPOSITORY RECTAL ONCE AS NEEDED
Status: DISCONTINUED | OUTPATIENT
Start: 2021-11-05 | End: 2021-11-05 | Stop reason: HOSPADM

## 2021-11-05 RX ADMIN — SODIUM CHLORIDE, POTASSIUM CHLORIDE, SODIUM LACTATE AND CALCIUM CHLORIDE 9 ML/HR: 600; 310; 30; 20 INJECTION, SOLUTION INTRAVENOUS at 07:56

## 2021-11-05 RX ADMIN — LIDOCAINE HYDROCHLORIDE 60 MG: 20 INJECTION, SOLUTION INFILTRATION; PERINEURAL at 08:57

## 2021-11-05 RX ADMIN — Medication 150 MCG/KG/MIN: at 08:57

## 2021-11-05 RX ADMIN — FAMOTIDINE 20 MG: 10 INJECTION, SOLUTION INTRAVENOUS at 07:57

## 2021-11-05 RX ADMIN — PROPOFOL 60 MG: 10 INJECTION, EMULSION INTRAVENOUS at 08:57

## 2021-11-05 RX ADMIN — CEFAZOLIN SODIUM 2 G: 2 INJECTION, SOLUTION INTRAVENOUS at 08:59

## 2021-11-05 NOTE — DISCHARGE INSTRUCTIONS
Dr. Anatoly Jones  [address]  [place] [address]  [phone]    Discharge Instructions for Minor Surgery    1. Go home, rest and take it easy today; however, you should get up and move about several times today to reduce the risk of developing a clot in your legs.      2. You may experience some dizziness or memory loss from the anesthesia.  This may last for the next 24 hours.  Someone should plan on staying with you for the first 24 hours for your safety.    3. Do not make any important legal decisions or sign any legal papers for the next 24 hours.      4. Eat and drink lightly today.  Start off with liquids, jello, soup, crackers or other bland foods at first. You may advance your diet tomorrow as tolerated as long as you do not experience any nausea or vomiting.     5. If skin glue (Dermabond) was used, your incisions are protected and covered.  The invisible glue will dissolve on its own as your incision heals. If dressings were used, you may remove your outer dressings in 3-4 days.   Please leave the little white tapes known as steri-strips alone.  They usually fall off in 1-2 weeks.  Do not worry if they come off sooner.     6. If dressings were used, you may notice some bleeding/drainage on your outer dressings. A little bloody drainage is normal. If the bleeding/drainage is such that the bandage cannot absorb it, remove the dressing, apply clean gauze and apply firm pressure for a full 15 minutes.  If the bleeding continues, please call me.    7. You may shower tomorrow allowing water to run over your incisions; however, do not scrub your incisions.  No tub baths until your incisions are completely healed.    8. You have received a prescription for a narcotic pain medicine, as you may have some pain/discomfort following surgery.   You will not be totally pain free, but your pain medicine should make the pain tolerable.  Please take your pain medicine as prescribed and always take  your pills with food to prevent nausea. If you are having severe pain that cannot be controlled by the pain medicine, please contact me.  If the pain is such that narcotic pain medicine is not required, you may take Tylenol or Ibuprofen as directed unless indicated otherwise.      9. You have also received a prescription for an anti-nausea medicine.  Please take this as prescribed for any nausea or vomiting.  Nausea could be a result of the anesthesia or a result of the narcotic pain medicine.  If you experience severe nausea and vomiting that cannot be controlled by the nausea medicine, please call me.      10. No driving for 24 hours and for as long as you are taking your prescription pain medicine.      11. Please call the office at [phone] to schedule a follow-up in about 1 week.      12. Remember to contact me for any of the following:    • Fever> 101 degrees  • Severe pain that cannot be controlled by taking your pain pills  • Severe nausea or vomiting that cannot be controlled by taking your nausea medicine  • Significant bleeding from your incision  • Drainage that has a bad smell or is yellow or green in appearance  • Any other questions or concerns

## 2021-11-05 NOTE — ANESTHESIA PREPROCEDURE EVALUATION
Anesthesia Evaluation     Patient summary reviewed and Nursing notes reviewed   no history of anesthetic complications:  NPO Solid Status: > 8 hours  NPO Liquid Status: > 2 hours           Airway   Mallampati: II  TM distance: >3 FB  Neck ROM: full  No difficulty expected  Dental    (+) implants    Pulmonary - normal exam   (+) a smoker Former, shortness of breath,   Cardiovascular - normal exam    ECG reviewed    (+) hypertension, hyperlipidemia,   CHF: ef 55-60.      Neuro/Psych  (+) headaches,     GI/Hepatic/Renal/Endo    (+)   thyroid problem hypothyroidism    Musculoskeletal     Abdominal  - normal exam   Substance History      OB/GYN          Other   arthritis,                      Anesthesia Plan    ASA 2     MAC   (I have reviewed the patient's history with the patient and the chart, including all pertinent laboratory results and imaging. I have explained the risks of anesthesia including but not limited to dental damage, corneal abrasion, nerve injury, MI, stroke, and death.    /98 (BP Location: Right arm, Patient Position: Sitting)   Pulse 76   Temp 36.5 °C (97.7 °F) (Oral)   Resp 18   SpO2 99%   )  intravenous induction     Anesthetic plan, all risks, benefits, and alternatives have been provided, discussed and informed consent has been obtained with: patient.    Plan discussed with CRNA.

## 2021-11-05 NOTE — OP NOTE
PREOPERATIVE DIAGNOSIS:  Soft tissue tumor right lower extremity    POSTOPERATIVE DIAGNOSIS (FINDINGS):  Same    PROCEDURE:  Excision of 7 x 4 cm subcutaneous soft tissue tumor right lower extremity    SURGEON:  Anatoly Jones MD    ASSISTANT:  Davion Bal    ANESTHESIA:  Monitored sedation    EBL:  Minimal    SPECIMEN(S):  Soft tissue tumor right lower extremity    DESCRIPTION:  In supine position under sedation prepped and draped usual sterile manner.  1% lidocaine with epinephrine infiltrated locally and elliptical incision made around the lesion in question that was medial and just superior to the right knee.  The lesion itself was completely excised with the electrocautery and was contained within the subcutaneous layer.  Good hemostasis was achieved with electrocautery.  Subcutaneous layer was closed with interrupted 3-0 Vicryl.  Skin was closed with running 4-0 nylon simple suture.  Sterile dressing applied.  Tolerated well.    Anatoly Jones M.D.

## 2021-11-05 NOTE — ANESTHESIA POSTPROCEDURE EVALUATION
Patient: Johana Corrigan    Procedure Summary     Date: 11/05/21 Room / Location:  [facility]  [facility]    Anesthesia Start: 0851 Anesthesia Stop: 0935    Procedure: LOWER EXTREMITY EXCISION LESION/CYST (Right ) Diagnosis:       Mass of right lower extremity      (Mass of right lower extremity [R22.41])    Surgeons: Anatoly Jones MD Provider: Wilda, Hao Meyer MD    Anesthesia Type: MAC ASA Status: 2          Anesthesia Type: MAC    Vitals  Vitals Value Taken Time   /49 11/05/21 0943   Temp     Pulse 74 11/05/21 0943   Resp 18 11/05/21 0943   SpO2 96 % 11/05/21 0943           Post Anesthesia Care and Evaluation    Patient location during evaluation: bedside  Patient participation: complete - patient participated  Level of consciousness: awake and alert  Pain management: adequate  Airway patency: patent  Anesthetic complications: No anesthetic complications  PONV Status: controlled  Cardiovascular status: acceptable  Respiratory status: acceptable  Hydration status: acceptable

## 2021-11-08 ENCOUNTER — TELEPHONE (OUTPATIENT)
Dept: SURGERY | Facility: CLINIC | Age: 81
End: 2021-11-08

## 2021-11-08 NOTE — TELEPHONE ENCOUNTER
The patient called the office and left a voicemail stating that she has clear fluid coming from her leg where she had surgery, S/P  Excision of 7 x 4 cm subcutaneous soft tissue tumor right lower extremity on 11/5/21 by Dr. Jones.  I attempted to return her call but she did not answer and voicemail box is full so I was unable to leave a message.

## 2021-11-09 NOTE — TELEPHONE ENCOUNTER
Patient called back in today. She stated that she has a watery, clear fluid coming from her leg where she had surgery. She denies any fever, or signs of infection. No odor coming from incision. Advised her it's probably a fluid collection. She has an appt to see Dr. Jones on 11/11. Advised to keep covered to protect her clothes.

## 2021-11-11 ENCOUNTER — OFFICE VISIT (OUTPATIENT)
Dept: SURGERY | Facility: CLINIC | Age: 81
End: 2021-11-11

## 2021-11-11 VITALS — WEIGHT: 133 LBS | HEIGHT: 61 IN | BODY MASS INDEX: 25.11 KG/M2

## 2021-11-11 DIAGNOSIS — Z09 FOLLOW UP: Primary | ICD-10-CM

## 2021-11-11 PROCEDURE — 99024 POSTOP FOLLOW-UP VISIT: CPT | Performed by: SURGERY

## 2021-11-11 NOTE — PROGRESS NOTES
Postoperative visit    Excision of 7 x 4 cm subcutaneous soft tissue tumor right lower extremity 1/5/2021    Office visit: Incision is healing well.  Specimen was sent to [facility] for outside pathology review and the report is still pending.  Return in 1 week for suture removal.

## 2021-11-12 LAB
DX PRELIMINARY: NORMAL
LAB AP CASE REPORT: NORMAL
LAB AP DIAGNOSIS COMMENT: NORMAL
PATH REPORT.FINAL DX SPEC: NORMAL
PATH REPORT.GROSS SPEC: NORMAL

## 2021-11-15 ENCOUNTER — TELEPHONE (OUTPATIENT)
Dept: SURGERY | Facility: CLINIC | Age: 81
End: 2021-11-15

## 2021-11-18 ENCOUNTER — OFFICE VISIT (OUTPATIENT)
Dept: SURGERY | Facility: CLINIC | Age: 81
End: 2021-11-18

## 2021-11-18 VITALS — HEIGHT: 61 IN | BODY MASS INDEX: 25.11 KG/M2 | WEIGHT: 133 LBS

## 2021-11-18 DIAGNOSIS — Z09 FOLLOW UP: Primary | ICD-10-CM

## 2021-11-18 PROCEDURE — 99024 POSTOP FOLLOW-UP VISIT: CPT | Performed by: SURGERY

## 2021-11-18 NOTE — PROGRESS NOTES
Incision has healed well although she is having some serous drainage from the inferior aspect.  Sutures were removed and the very most inferior portion of her incision slightly .  The remainder of the incision remained intact and Steri-Strips were applied.  Informed her that she is likely draining a seroma and now that that portion has been open this should resolve quickly and heal fine.  Final pathology was discussed and was benign (cystic fat necrosis).

## 2021-11-23 ENCOUNTER — TELEPHONE (OUTPATIENT)
Dept: SURGERY | Facility: CLINIC | Age: 81
End: 2021-11-23

## 2021-11-23 RX ORDER — AMOXICILLIN AND CLAVULANATE POTASSIUM 875; 125 MG/1; MG/1
1 TABLET, FILM COATED ORAL 2 TIMES DAILY
Qty: 20 TABLET | Refills: 0 | Status: SHIPPED | OUTPATIENT
Start: 2021-11-23 | End: 2021-11-29

## 2021-11-23 NOTE — TELEPHONE ENCOUNTER
Caller: SADIE MESSI  Relationship to Patient: SELF  Pharmacy: [place], [place]; PHONE [phone]  Phone Number: [phone]  Reason for Call: SADIE'S INCISION IS NOT FULLY HEALED YET. PART OF IT IS STILL HOT AND RED. SADIE HAS BEEN USING THE BACITRACIN, AND THAT SECTION THAT WAS NOT CLOSED YET AT THE LATEST APPT IS NOT YET HEALED. SHE HAS BEEN USING GAUZE, AND STERI STRIPS. NO BLOOD, JUST CLEAR LIQUID. THAT SECTION IS TOTALLY SURROUNDED BY A RED [place]. SADIE NEEDS SOMETHING ELSE FOR THIS AREA.    SADIE IS CURRENTLY OUT OF TOWN AND IN [place].

## 2021-11-23 NOTE — TELEPHONE ENCOUNTER
Called in Augmentin to a [facility] pharmacy in [place] per pt request [phone]

## 2021-11-23 NOTE — TELEPHONE ENCOUNTER
Patient states the open portion of her incision is red and hot to touch and is concerned that it is infected. Drainage is still serous. She has been applying bacitracin to the open portion of the incision.

## 2021-11-29 ENCOUNTER — OFFICE VISIT (OUTPATIENT)
Dept: SURGERY | Facility: CLINIC | Age: 81
End: 2021-11-29

## 2021-11-29 DIAGNOSIS — Z09 FOLLOW UP: Primary | ICD-10-CM

## 2021-11-29 PROCEDURE — 99024 POSTOP FOLLOW-UP VISIT: CPT | Performed by: SURGERY

## 2021-11-29 RX ORDER — HYDROCODONE BITARTRATE AND ACETAMINOPHEN 5; 325 MG/1; MG/1
1 TABLET ORAL EVERY 6 HOURS PRN
COMMUNITY
Start: 2021-11-05 | End: 2021-11-29

## 2021-11-29 RX ORDER — CEPHALEXIN 500 MG/1
500 CAPSULE ORAL 3 TIMES DAILY
COMMUNITY
Start: 2021-11-28 | End: 2021-12-16

## 2021-11-29 RX ORDER — ONDANSETRON 4 MG/1
4 TABLET, FILM COATED ORAL EVERY 12 HOURS PRN
COMMUNITY
Start: 2021-11-05 | End: 2021-11-29

## 2021-11-29 RX ORDER — DOXYCYCLINE 100 MG/1
100 CAPSULE ORAL 2 TIMES DAILY
COMMUNITY
Start: 2021-11-28 | End: 2021-12-06 | Stop reason: SDUPTHER

## 2021-12-02 ENCOUNTER — TELEPHONE (OUTPATIENT)
Dept: SURGERY | Facility: CLINIC | Age: 81
End: 2021-12-02

## 2021-12-02 RX ORDER — DOXYCYCLINE HYCLATE 100 MG/1
100 CAPSULE ORAL 2 TIMES DAILY
Qty: 14 CAPSULE | Refills: 0 | Status: SHIPPED | OUTPATIENT
Start: 2021-12-02 | End: 2021-12-09

## 2021-12-02 RX ORDER — CEPHALEXIN 500 MG/1
500 CAPSULE ORAL 3 TIMES DAILY
Qty: 21 CAPSULE | Refills: 0 | Status: SHIPPED | OUTPATIENT
Start: 2021-12-02 | End: 2021-12-09

## 2021-12-02 NOTE — TELEPHONE ENCOUNTER
Patient called in stating that she is getting ready to finish her Keflex and Doxycycline that was prescribed while in [place].. She is asking if she can get refills to take through the weekend. She says that the incision is getting better, but feels like she needs a few more days of the antibiotics. She is scheduled to see you on Monday, 12/6.

## 2021-12-02 NOTE — TELEPHONE ENCOUNTER
Called patient to let her know that refills of both antibiotics were sent to her pharmacy. She verbalized understanding.

## 2021-12-03 NOTE — PROGRESS NOTES
She unfortunately ended up having worsening erythema and drainage after she left town and was admitted for several days at [facility].  She is overall doing better now.  Erythema is not severe and drainage is serous in nature.  Wound care instructions discussed.  Follow-up in 2 weeks.

## 2021-12-06 ENCOUNTER — OFFICE VISIT (OUTPATIENT)
Dept: SURGERY | Facility: CLINIC | Age: 81
End: 2021-12-06

## 2021-12-06 DIAGNOSIS — Z09 FOLLOW UP: Primary | ICD-10-CM

## 2021-12-06 PROCEDURE — 99024 POSTOP FOLLOW-UP VISIT: CPT | Performed by: SURGERY

## 2021-12-08 NOTE — PROGRESS NOTES
Erythema all but resolved.  Drainage much less.  Tenderness minimal.  Culture from [place] demonstrated methicillin sensitive staph aureus.  Return in 2 weeks to recheck wound.

## 2021-12-13 ENCOUNTER — TRANSCRIBE ORDERS (OUTPATIENT)
Dept: ADMINISTRATIVE | Facility: HOSPITAL | Age: 81
End: 2021-12-13

## 2021-12-13 DIAGNOSIS — Z12.31 SCREENING MAMMOGRAM FOR HIGH-RISK PATIENT: Primary | ICD-10-CM

## 2021-12-16 ENCOUNTER — OFFICE VISIT (OUTPATIENT)
Dept: SURGERY | Facility: CLINIC | Age: 81
End: 2021-12-16

## 2021-12-16 VITALS — HEIGHT: 61 IN | WEIGHT: 133 LBS | BODY MASS INDEX: 25.11 KG/M2

## 2021-12-16 DIAGNOSIS — Z09 FOLLOW UP: Primary | ICD-10-CM

## 2021-12-16 PROCEDURE — 99024 POSTOP FOLLOW-UP VISIT: CPT | Performed by: PHYSICIAN ASSISTANT

## 2021-12-16 NOTE — PROGRESS NOTES
This is an 81-year-old lady returning to the office today for 2-week follow-up for her right inner thigh wound check.  The wound continues to heal well with a small subcentimeter opening still present that drain serous fluid only.  She has some irritation around the area of the bandage and has been using hydrocortisone cream to treat.  I encouraged her to continue her dressing changes and reassured her that this will continue to close appropriately.  All questions were answered and she was willing to proceed with all recommendations.    Davion Bal PA-C

## 2021-12-17 ENCOUNTER — OFFICE VISIT (OUTPATIENT)
Dept: INTERNAL MEDICINE | Facility: CLINIC | Age: 81
End: 2021-12-17

## 2021-12-17 VITALS
HEART RATE: 88 BPM | BODY MASS INDEX: 25.3 KG/M2 | SYSTOLIC BLOOD PRESSURE: 128 MMHG | DIASTOLIC BLOOD PRESSURE: 72 MMHG | HEIGHT: 61 IN | WEIGHT: 134 LBS

## 2021-12-17 DIAGNOSIS — I10 ESSENTIAL HYPERTENSION: ICD-10-CM

## 2021-12-17 DIAGNOSIS — R60.9 PERIPHERAL EDEMA: Primary | ICD-10-CM

## 2021-12-17 DIAGNOSIS — L03.90 WOUND CELLULITIS: ICD-10-CM

## 2021-12-17 DIAGNOSIS — E03.9 HYPOTHYROIDISM, UNSPECIFIED TYPE: ICD-10-CM

## 2021-12-17 PROCEDURE — 99214 OFFICE O/P EST MOD 30 MIN: CPT | Performed by: INTERNAL MEDICINE

## 2021-12-17 RX ORDER — HYDROCHLOROTHIAZIDE 25 MG/1
25 TABLET ORAL DAILY
Qty: 90 TABLET | Refills: 1 | Status: SHIPPED | OUTPATIENT
Start: 2021-12-17 | End: 2022-01-27 | Stop reason: SDUPTHER

## 2021-12-17 NOTE — PROGRESS NOTES
Chief Complaint   Patient presents with   • Foot Swelling       History of Present Illness   Johana Corrigan is a 81 y.o. female presents for acute needs. She was recently admitted to the hospital with cellulitic biopsy site. She reports that hospitalization was beneficial in not only allowing wound to heal but also because she was able to destress and only focus on her care.   She had goal to maintain a low stress lifestyle. She reports that has been challenging during the christmas season.   Today main concern is of lower extremity edema.     The following portions of the patient's history were reviewed and updated as appropriate: allergies, current medications, past family history, past medical history, past social history, past surgical history and problem list.  Current Outpatient Medications on File Prior to Visit   Medication Sig Dispense Refill   • azelastine (ASTELIN) 0.1 % nasal spray 2 sprays into the nostril(s) as directed by provider 2 (Two) Times a Day. Use in each nostril as directed 1 each 12   • Calcium-Phosphorus-Vitamin D (CALCIUM/D3 ADULT GUMMIES PO) Take 2 tablets by mouth Every Morning.     • Cholecalciferol (Vitamin D) 50 MCG (2000 UT) tablet Take 2,000 Units by mouth Daily.     • losartan (Cozaar) 50 MG tablet Take 1 tablet by mouth Daily. 90 tablet 3   • rosuvastatin (CRESTOR) 5 MG tablet Take 1 tablet by mouth Daily. 90 tablet 3   • Synthroid 75 MCG tablet TAKE 1 TABLET EVERY DAY 90 tablet 0   • [DISCONTINUED] hydroCHLOROthiazide (HYDRODIURIL) 12.5 MG tablet Take 1 tablet by mouth Daily. 30 tablet 1   • [DISCONTINUED] ibuprofen (ADVIL,MOTRIN) 200 MG tablet Take 200 mg by mouth Every 6 (Six) Hours As Needed for Mild Pain .       No current facility-administered medications on file prior to visit.     Review of Systems   Constitutional: Negative.    HENT: Negative.    Eyes: Negative.    Respiratory: Negative.    Cardiovascular: Positive for leg swelling.   Gastrointestinal: Negative.   "  Endocrine: Negative.    Genitourinary: Negative.    Musculoskeletal: Negative.    Skin: Negative.    Allergic/Immunologic: Negative.    Neurological: Negative.    Hematological: Negative.    Psychiatric/Behavioral: Negative.        Objective   Physical Exam  Vitals and nursing note reviewed.   Constitutional:       Appearance: Normal appearance.   HENT:      Head: Normocephalic and atraumatic.      Right Ear: Tympanic membrane normal.      Left Ear: Tympanic membrane normal.      Nose: Nose normal.      Mouth/Throat:      Mouth: Mucous membranes are moist.   Eyes:      Extraocular Movements: Extraocular movements intact.      Pupils: Pupils are equal, round, and reactive to light.   Cardiovascular:      Rate and Rhythm: Normal rate and regular rhythm.      Pulses: Normal pulses.      Heart sounds: Normal heart sounds.      Comments: 2+ peripheral edema    Pulmonary:      Effort: Pulmonary effort is normal.      Breath sounds: Normal breath sounds.   Abdominal:      General: Abdomen is flat. Bowel sounds are normal.      Palpations: Abdomen is soft.   Musculoskeletal:         General: Normal range of motion.      Cervical back: Normal range of motion.   Skin:     General: Skin is warm and dry.      Comments: Surgical site well healing. No notable erythema. Right thigh.    Neurological:      General: No focal deficit present.      Mental Status: She is alert and oriented to person, place, and time.   Psychiatric:         Mood and Affect: Mood normal.         Behavior: Behavior normal.         Thought Content: Thought content normal.         Judgment: Judgment normal.          /72   Pulse 88   Ht 154.9 cm (61\")   Wt 60.8 kg (134 lb)   BMI 25.32 kg/m²     Assessment/Plan   Diagnoses and all orders for this visit:    Peripheral edema    Hypothyroidism, unspecified type  -     TSH  -     T4, Free    Essential hypertension  -     Basic metabolic panel; Future    Wound cellulitis    Other orders  -     " hydroCHLOROthiazide (HYDRODIURIL) 25 MG tablet; Take 1 tablet by mouth Daily.      Patient w/ recent hospitalization. Wound site is now healing well. She does have some peripheral edema. Will increase hctz to 25 mg. Will test potassium in 1-2 weeks. She will continue current medication for hypothyroidism. She will have tsh and free t4 tested to ensure appropriate thyroid replacement. Will continue losartan for bp control as well but may need to decrease if bp drops on increased hctz.

## 2022-01-10 ENCOUNTER — OFFICE VISIT (OUTPATIENT)
Dept: INTERNAL MEDICINE | Facility: CLINIC | Age: 82
End: 2022-01-10

## 2022-01-10 VITALS
DIASTOLIC BLOOD PRESSURE: 68 MMHG | SYSTOLIC BLOOD PRESSURE: 128 MMHG | HEIGHT: 61 IN | HEART RATE: 76 BPM | WEIGHT: 136 LBS | BODY MASS INDEX: 25.68 KG/M2

## 2022-01-10 DIAGNOSIS — E03.8 OTHER SPECIFIED HYPOTHYROIDISM: ICD-10-CM

## 2022-01-10 DIAGNOSIS — R35.0 URINARY FREQUENCY: ICD-10-CM

## 2022-01-10 DIAGNOSIS — I10 ESSENTIAL HYPERTENSION: Primary | ICD-10-CM

## 2022-01-10 LAB
BILIRUB BLD-MCNC: NEGATIVE MG/DL
CLARITY, POC: ABNORMAL
COLOR UR: YELLOW
EXPIRATION DATE: ABNORMAL
GLUCOSE UR STRIP-MCNC: NEGATIVE MG/DL
KETONES UR QL: NEGATIVE
LEUKOCYTE EST, POC: ABNORMAL
Lab: ABNORMAL
NITRITE UR-MCNC: POSITIVE MG/ML
PH UR: 7 [PH] (ref 5–8)
PROT UR STRIP-MCNC: ABNORMAL MG/DL
RBC # UR STRIP: ABNORMAL /UL
SP GR UR: 1.02 (ref 1–1.03)
UROBILINOGEN UR QL: NORMAL

## 2022-01-10 PROCEDURE — 99214 OFFICE O/P EST MOD 30 MIN: CPT | Performed by: INTERNAL MEDICINE

## 2022-01-10 PROCEDURE — 81003 URINALYSIS AUTO W/O SCOPE: CPT | Performed by: INTERNAL MEDICINE

## 2022-01-10 RX ORDER — CEPHALEXIN 500 MG/1
500 CAPSULE ORAL 2 TIMES DAILY
Qty: 20 CAPSULE | Refills: 0 | Status: SHIPPED | OUTPATIENT
Start: 2022-01-10 | End: 2022-03-18

## 2022-01-10 NOTE — PROGRESS NOTES
Chief Complaint   Patient presents with   • Hypertension   • Urinary Frequency   • Hypothyroidism       History of Present Illness   Johana Corrigan is a 81 y.o. female presents for acute care. She notes increased urinary urgency and frequency in the last several days. She reports this can happen when it is colder out. She did increase hctz to 25 mg at last visit. This is contributing to urinary frequency. It has improved peripheral edema. She is very busy with multiple organizational commitments. bp has been normotensive.         The following portions of the patient's history were reviewed and updated as appropriate: allergies, current medications, past family history, past medical history, past social history, past surgical history and problem list.  Current Outpatient Medications on File Prior to Visit   Medication Sig Dispense Refill   • azelastine (ASTELIN) 0.1 % nasal spray 2 sprays into the nostril(s) as directed by provider 2 (Two) Times a Day. Use in each nostril as directed 1 each 12   • Calcium-Phosphorus-Vitamin D (CALCIUM/D3 ADULT GUMMIES PO) Take 2 tablets by mouth Every Morning.     • Cholecalciferol (Vitamin D) 50 MCG (2000 UT) tablet Take 2,000 Units by mouth Daily.     • hydroCHLOROthiazide (HYDRODIURIL) 25 MG tablet Take 1 tablet by mouth Daily. 90 tablet 1   • losartan (Cozaar) 50 MG tablet Take 1 tablet by mouth Daily. 90 tablet 3   • rosuvastatin (CRESTOR) 5 MG tablet Take 1 tablet by mouth Daily. 90 tablet 3   • Synthroid 75 MCG tablet TAKE 1 TABLET EVERY DAY 90 tablet 0     No current facility-administered medications on file prior to visit.     Review of Systems   Constitutional: Negative.    HENT: Negative.    Eyes: Negative.    Respiratory: Negative.    Cardiovascular: Negative.  Negative for leg swelling.   Gastrointestinal: Negative.    Endocrine: Negative.    Genitourinary: Positive for frequency and urgency.   Musculoskeletal: Negative.    Skin: Negative.    Allergic/Immunologic:  "Negative.    Neurological: Negative.    Hematological: Negative.    Psychiatric/Behavioral: Negative.        Objective   Physical Exam  Vitals and nursing note reviewed.   Constitutional:       Appearance: Normal appearance. She is well-developed.   HENT:      Head: Normocephalic and atraumatic.      Right Ear: Tympanic membrane and external ear normal.      Left Ear: Tympanic membrane and external ear normal.      Nose: Nose normal.      Mouth/Throat:      Mouth: Mucous membranes are moist.   Eyes:      Extraocular Movements: Extraocular movements intact.      Pupils: Pupils are equal, round, and reactive to light.   Cardiovascular:      Rate and Rhythm: Normal rate and regular rhythm.      Pulses: Normal pulses.      Heart sounds: Normal heart sounds.   Pulmonary:      Effort: Pulmonary effort is normal. No respiratory distress.      Breath sounds: Normal breath sounds.   Abdominal:      General: Abdomen is flat.      Palpations: Abdomen is soft.   Musculoskeletal:         General: Normal range of motion.      Cervical back: Normal range of motion and neck supple.   Skin:     General: Skin is warm and dry.   Neurological:      General: No focal deficit present.      Mental Status: She is alert and oriented to person, place, and time.   Psychiatric:         Mood and Affect: Mood normal.         Behavior: Behavior normal.         Thought Content: Thought content normal.         Judgment: Judgment normal.          /68   Pulse 76   Ht 154.9 cm (61\")   Wt 61.7 kg (136 lb)   BMI 25.70 kg/m²     Assessment/Plan   Diagnoses and all orders for this visit:    Essential hypertension  -     Basic Metabolic Panel  -     T4, Free  -     TSH    Other specified hypothyroidism  -     Basic Metabolic Panel  -     T4, Free  -     TSH    Urinary frequency  -     POC Urinalysis Dipstick, Automated  -     Urine Culture - Urine, Urine, Clean Catch; Future      Patient w/ htn . She will bp levels at home and advise. Suspect " urinary frequency is a combination of increased caffeine and increase in hctz. However, will check a u/a and culture. She will monitor thyroid levels closely. She will continue to wear compression stockings all day long and avoid sodium. Routine follow up.

## 2022-01-13 LAB
BACTERIA UR CULT: ABNORMAL
BACTERIA UR CULT: ABNORMAL
OTHER ANTIBIOTIC SUSC ISLT: ABNORMAL

## 2022-01-20 ENCOUNTER — HOSPITAL ENCOUNTER (OUTPATIENT)
Dept: MAMMOGRAPHY | Facility: HOSPITAL | Age: 82
Discharge: HOME | End: 2022-01-20
Admitting: INTERNAL MEDICINE

## 2022-01-20 DIAGNOSIS — Z12.31 SCREENING MAMMOGRAM FOR HIGH-RISK PATIENT: ICD-10-CM

## 2022-01-20 PROCEDURE — 77063 BREAST TOMOSYNTHESIS BI: CPT

## 2022-01-20 PROCEDURE — 77067 SCR MAMMO BI INCL CAD: CPT

## 2022-01-27 RX ORDER — HYDROCHLOROTHIAZIDE 25 MG/1
25 TABLET ORAL DAILY
Qty: 90 TABLET | Refills: 1 | Status: SHIPPED | OUTPATIENT
Start: 2022-01-27 | End: 2022-04-25

## 2022-01-27 RX ORDER — LOSARTAN POTASSIUM 50 MG/1
50 TABLET ORAL DAILY
Qty: 90 TABLET | Refills: 3 | Status: SHIPPED | OUTPATIENT
Start: 2022-01-27 | End: 2022-05-05 | Stop reason: SDUPTHER

## 2022-01-27 RX ORDER — ROSUVASTATIN CALCIUM 5 MG/1
5 TABLET, COATED ORAL DAILY
Qty: 90 TABLET | Refills: 3 | Status: SHIPPED | OUTPATIENT
Start: 2022-01-27

## 2022-02-24 RX ORDER — FLUTICASONE PROPIONATE 50 MCG
2 SPRAY, SUSPENSION (ML) NASAL DAILY
Qty: 16 G | Refills: 4 | Status: SHIPPED | OUTPATIENT
Start: 2022-02-24 | End: 2022-05-05

## 2022-02-24 RX ORDER — MONTELUKAST SODIUM 10 MG/1
10 TABLET ORAL NIGHTLY
Qty: 90 TABLET | Refills: 3 | Status: SHIPPED | OUTPATIENT
Start: 2022-02-24 | End: 2022-06-24 | Stop reason: SDUPTHER

## 2022-03-18 ENCOUNTER — OFFICE VISIT (OUTPATIENT)
Dept: INTERNAL MEDICINE | Facility: CLINIC | Age: 82
End: 2022-03-18

## 2022-03-18 ENCOUNTER — HOSPITAL ENCOUNTER (OUTPATIENT)
Dept: CARDIOLOGY | Facility: HOSPITAL | Age: 82
Discharge: HOME | End: 2022-03-18
Admitting: INTERNAL MEDICINE

## 2022-03-18 VITALS
HEIGHT: 61 IN | DIASTOLIC BLOOD PRESSURE: 60 MMHG | BODY MASS INDEX: 25.49 KG/M2 | SYSTOLIC BLOOD PRESSURE: 130 MMHG | WEIGHT: 135 LBS

## 2022-03-18 DIAGNOSIS — I10 ESSENTIAL HYPERTENSION: ICD-10-CM

## 2022-03-18 DIAGNOSIS — R60.0 EDEMA OF LEFT LOWER EXTREMITY: ICD-10-CM

## 2022-03-18 DIAGNOSIS — E55.9 VITAMIN D DEFICIENCY: ICD-10-CM

## 2022-03-18 DIAGNOSIS — Z79.899 LONG TERM CURRENT USE OF DIURETIC: ICD-10-CM

## 2022-03-18 DIAGNOSIS — R60.0 EDEMA OF LEFT LOWER EXTREMITY: Primary | ICD-10-CM

## 2022-03-18 DIAGNOSIS — R53.83 OTHER FATIGUE: ICD-10-CM

## 2022-03-18 LAB
BH CV LOWER VASCULAR LEFT COMMON FEMORAL AUGMENT: NORMAL
BH CV LOWER VASCULAR LEFT COMMON FEMORAL COMPETENT: NORMAL
BH CV LOWER VASCULAR LEFT COMMON FEMORAL COMPRESS: NORMAL
BH CV LOWER VASCULAR LEFT COMMON FEMORAL PHASIC: NORMAL
BH CV LOWER VASCULAR LEFT COMMON FEMORAL SPONT: NORMAL
BH CV LOWER VASCULAR LEFT DISTAL FEMORAL COMPRESS: NORMAL
BH CV LOWER VASCULAR LEFT GASTRONEMIUS COMPRESS: NORMAL
BH CV LOWER VASCULAR LEFT GREATER SAPH AK COMPRESS: NORMAL
BH CV LOWER VASCULAR LEFT GREATER SAPH BK COMPRESS: NORMAL
BH CV LOWER VASCULAR LEFT LESSER SAPH COMPRESS: NORMAL
BH CV LOWER VASCULAR LEFT MID FEMORAL AUGMENT: NORMAL
BH CV LOWER VASCULAR LEFT MID FEMORAL COMPETENT: NORMAL
BH CV LOWER VASCULAR LEFT MID FEMORAL COMPRESS: NORMAL
BH CV LOWER VASCULAR LEFT MID FEMORAL PHASIC: NORMAL
BH CV LOWER VASCULAR LEFT MID FEMORAL SPONT: NORMAL
BH CV LOWER VASCULAR LEFT PERONEAL COMPRESS: NORMAL
BH CV LOWER VASCULAR LEFT POPLITEAL AUGMENT: NORMAL
BH CV LOWER VASCULAR LEFT POPLITEAL COMPETENT: NORMAL
BH CV LOWER VASCULAR LEFT POPLITEAL COMPRESS: NORMAL
BH CV LOWER VASCULAR LEFT POPLITEAL PHASIC: NORMAL
BH CV LOWER VASCULAR LEFT POPLITEAL SPONT: NORMAL
BH CV LOWER VASCULAR LEFT POSTERIOR TIBIAL COMPRESS: NORMAL
BH CV LOWER VASCULAR LEFT PROFUNDA FEMORAL COMPRESS: NORMAL
BH CV LOWER VASCULAR LEFT PROXIMAL FEMORAL COMPRESS: NORMAL
BH CV LOWER VASCULAR LEFT SAPHENOFEMORAL JUNCTION COMPRESS: NORMAL
BH CV LOWER VASCULAR RIGHT COMMON FEMORAL AUGMENT: NORMAL
BH CV LOWER VASCULAR RIGHT COMMON FEMORAL COMPETENT: NORMAL
BH CV LOWER VASCULAR RIGHT COMMON FEMORAL COMPRESS: NORMAL
BH CV LOWER VASCULAR RIGHT COMMON FEMORAL PHASIC: NORMAL
BH CV LOWER VASCULAR RIGHT COMMON FEMORAL SPONT: NORMAL
MAXIMAL PREDICTED HEART RATE: 139 BPM
STRESS TARGET HR: 118 BPM

## 2022-03-18 PROCEDURE — 93971 EXTREMITY STUDY: CPT

## 2022-03-18 PROCEDURE — 99214 OFFICE O/P EST MOD 30 MIN: CPT | Performed by: INTERNAL MEDICINE

## 2022-03-18 NOTE — PROGRESS NOTES
Chief Complaint   Patient presents with   • Fatigue   • Hypertension   • Edema       History of Present Illness   Johana Corrigna is a 81 y.o. female presents for acute care. She reports fatigue.she is very active in multiple organizations. She has not been able to dedicate to routine physical activity and is working on nutrition.  Some shortness of air. She is on hctz and losartan for hypertension. Has not been testing bp routinely.   Patient has weight concerns. She is going to the gym for this.   Left > right lower extremity edema. Previously w/ RLE edema        The following portions of the patient's history were reviewed and updated as appropriate: allergies, current medications, past family history, past medical history, past social history, past surgical history and problem list.  Current Outpatient Medications on File Prior to Visit   Medication Sig Dispense Refill   • azelastine (ASTELIN) 0.1 % nasal spray 2 sprays into the nostril(s) as directed by provider 2 (Two) Times a Day. Use in each nostril as directed 1 each 12   • Calcium-Phosphorus-Vitamin D (CALCIUM/D3 ADULT GUMMIES PO) Take 2 tablets by mouth Every Morning.     • Cholecalciferol (Vitamin D) 50 MCG (2000 UT) tablet Take 2,000 Units by mouth Daily.     • fluticasone (Flonase) 50 MCG/ACT nasal spray 2 sprays into the nostril(s) as directed by provider Daily. 16 g 4   • hydroCHLOROthiazide (HYDRODIURIL) 25 MG tablet Take 1 tablet by mouth Daily. 90 tablet 1   • losartan (Cozaar) 50 MG tablet Take 1 tablet by mouth Daily. 90 tablet 3   • montelukast (Singulair) 10 MG tablet Take 1 tablet by mouth Every Night. 90 tablet 3   • rosuvastatin (CRESTOR) 5 MG tablet Take 1 tablet by mouth Daily. 90 tablet 3   • Synthroid 75 MCG tablet TAKE 1 TABLET EVERY DAY 90 tablet 0   • [DISCONTINUED] cephalexin (Keflex) 500 MG capsule Take 1 capsule by mouth 2 (Two) Times a Day. 20 capsule 0     No current facility-administered medications on file prior to visit.      Review of Systems   Constitutional: Positive for fatigue.   HENT: Negative.    Eyes: Negative.    Respiratory: Positive for shortness of breath.    Cardiovascular: Positive for leg swelling.   Gastrointestinal: Negative.    Endocrine: Negative.    Genitourinary: Negative.    Musculoskeletal: Negative.    Allergic/Immunologic: Negative.    Neurological: Negative.    Hematological: Bruises/bleeds easily.   Psychiatric/Behavioral: Negative.        Objective   Physical Exam  Constitutional:       Appearance: Normal appearance. She is well-developed.   HENT:      Head: Normocephalic and atraumatic.      Right Ear: Hearing, tympanic membrane and external ear normal.      Left Ear: Hearing, tympanic membrane and external ear normal.      Nose: Nose normal.      Mouth/Throat:      Mouth: Mucous membranes are moist.   Eyes:      Extraocular Movements: Extraocular movements intact.      Pupils: Pupils are equal, round, and reactive to light.   Neck:      Thyroid: No thyromegaly.   Cardiovascular:      Rate and Rhythm: Normal rate and regular rhythm.      Pulses: Normal pulses.      Heart sounds: Normal heart sounds. No murmur heard.  Pulmonary:      Effort: Pulmonary effort is normal.      Breath sounds: Normal breath sounds.   Chest:   Breasts:      Right: No mass.      Left: No mass.       Abdominal:      General: Abdomen is flat. There is no distension.      Palpations: Abdomen is soft.      Tenderness: There is no abdominal tenderness.   Musculoskeletal:      Cervical back: Normal range of motion and neck supple.   Lymphadenopathy:      Cervical: No cervical adenopathy.   Skin:     General: Skin is warm and dry.   Neurological:      General: No focal deficit present.      Mental Status: She is alert and oriented to person, place, and time.   Psychiatric:         Mood and Affect: Mood normal.         Speech: Speech normal.         Behavior: Behavior normal.         Thought Content: Thought content normal.          "Judgment: Judgment normal.          /60   Ht 154.9 cm (61\")   Wt 61.2 kg (135 lb)   BMI 25.51 kg/m²     Assessment/Plan   Diagnoses and all orders for this visit:    Edema of left lower extremity  -     Duplex Venous Lower Extremity - Left CAR; Future  -     CBC & Differential  -     Comprehensive Metabolic Panel  -     TSH  -     T4, Free  -     Vitamin D 25 Hydroxy  -     Magnesium  -     D-dimer, Quantitative    Other fatigue  -     CBC & Differential  -     Comprehensive Metabolic Panel  -     TSH  -     T4, Free  -     Vitamin D 25 Hydroxy  -     Magnesium    Essential hypertension  -     CBC & Differential  -     Comprehensive Metabolic Panel  -     TSH  -     T4, Free  -     Vitamin D 25 Hydroxy  -     Magnesium    Long term current use of diuretic  -     Comprehensive Metabolic Panel  -     TSH  -     Magnesium    Vitamin D deficiency  -     Vitamin D 25 Hydroxy      Patient w/ L>R lower extremity edema. She will check a d dimer and if positive is to get an ultrasound. She will have listed labs tested given fatigue. She is on a diuretic and will ensure that electrolytes are wnl. She modify treatment based on lab results. Encouraged healthy nutrition, hydration, movement, and sleep.            "

## 2022-03-19 LAB
25(OH)D3+25(OH)D2 SERPL-MCNC: 71.8 NG/ML (ref 30–100)
ALBUMIN SERPL-MCNC: 4.4 G/DL (ref 3.6–4.6)
ALBUMIN/GLOB SERPL: 2.2 {RATIO} (ref 1.2–2.2)
ALP SERPL-CCNC: 57 IU/L (ref 44–121)
ALT SERPL-CCNC: 18 IU/L (ref 0–32)
AST SERPL-CCNC: 30 IU/L (ref 0–40)
BASOPHILS # BLD AUTO: 0 X10E3/UL (ref 0–0.2)
BASOPHILS NFR BLD AUTO: 1 %
BILIRUB SERPL-MCNC: 0.3 MG/DL (ref 0–1.2)
BUN SERPL-MCNC: 16 MG/DL (ref 8–27)
BUN/CREAT SERPL: 22 (ref 12–28)
CALCIUM SERPL-MCNC: 9.6 MG/DL (ref 8.7–10.3)
CHLORIDE SERPL-SCNC: 96 MMOL/L (ref 96–106)
CO2 SERPL-SCNC: 23 MMOL/L (ref 20–29)
CREAT SERPL-MCNC: 0.73 MG/DL (ref 0.57–1)
D DIMER PPP FEU-MCNC: 0.51 MG/L FEU (ref 0–0.49)
EGFRCR SERPLBLD CKD-EPI 2021: 83 ML/MIN/1.73
EOSINOPHIL # BLD AUTO: 0.1 X10E3/UL (ref 0–0.4)
EOSINOPHIL NFR BLD AUTO: 1 %
ERYTHROCYTE [DISTWIDTH] IN BLOOD BY AUTOMATED COUNT: 12.2 % (ref 11.7–15.4)
GLOBULIN SER CALC-MCNC: 2 G/DL (ref 1.5–4.5)
GLUCOSE SERPL-MCNC: 91 MG/DL (ref 65–99)
HCT VFR BLD AUTO: 40 % (ref 34–46.6)
HGB BLD-MCNC: 13.6 G/DL (ref 11.1–15.9)
IMM GRANULOCYTES # BLD AUTO: 0 X10E3/UL (ref 0–0.1)
IMM GRANULOCYTES NFR BLD AUTO: 0 %
LYMPHOCYTES # BLD AUTO: 1.6 X10E3/UL (ref 0.7–3.1)
LYMPHOCYTES NFR BLD AUTO: 29 %
MAGNESIUM SERPL-MCNC: 2 MG/DL (ref 1.6–2.3)
MCH RBC QN AUTO: 30.8 PG (ref 26.6–33)
MCHC RBC AUTO-ENTMCNC: 34 G/DL (ref 31.5–35.7)
MCV RBC AUTO: 91 FL (ref 79–97)
MONOCYTES # BLD AUTO: 0.4 X10E3/UL (ref 0.1–0.9)
MONOCYTES NFR BLD AUTO: 8 %
NEUTROPHILS # BLD AUTO: 3.5 X10E3/UL (ref 1.4–7)
NEUTROPHILS NFR BLD AUTO: 61 %
PLATELET # BLD AUTO: 199 X10E3/UL (ref 150–450)
POTASSIUM SERPL-SCNC: 3.5 MMOL/L (ref 3.5–5.2)
PROT SERPL-MCNC: 6.4 G/DL (ref 6–8.5)
RBC # BLD AUTO: 4.41 X10E6/UL (ref 3.77–5.28)
SODIUM SERPL-SCNC: 136 MMOL/L (ref 134–144)
T4 FREE SERPL-MCNC: 1.86 NG/DL (ref 0.82–1.77)
TSH SERPL DL<=0.005 MIU/L-ACNC: 1.39 UIU/ML (ref 0.45–4.5)
WBC # BLD AUTO: 5.8 X10E3/UL (ref 3.4–10.8)

## 2022-03-22 DIAGNOSIS — R79.89 POSITIVE D DIMER: ICD-10-CM

## 2022-03-22 DIAGNOSIS — R60.0 UNILATERAL EDEMA OF LOWER EXTREMITY: Primary | ICD-10-CM

## 2022-03-28 ENCOUNTER — HOSPITAL ENCOUNTER (OUTPATIENT)
Dept: CT IMAGING | Facility: HOSPITAL | Age: 82
Discharge: HOME | End: 2022-03-28
Admitting: INTERNAL MEDICINE

## 2022-03-28 DIAGNOSIS — R60.0 UNILATERAL EDEMA OF LOWER EXTREMITY: ICD-10-CM

## 2022-03-28 DIAGNOSIS — R79.89 POSITIVE D DIMER: ICD-10-CM

## 2022-03-28 PROCEDURE — 25010000002 IOPAMIDOL 61 % SOLUTION: Performed by: INTERNAL MEDICINE

## 2022-03-28 PROCEDURE — 82565 ASSAY OF CREATININE: CPT

## 2022-03-28 PROCEDURE — 74177 CT ABD & PELVIS W/CONTRAST: CPT

## 2022-03-28 RX ADMIN — IOPAMIDOL 100 ML: 612 INJECTION, SOLUTION INTRAVENOUS at 11:27

## 2022-03-29 ENCOUNTER — TELEPHONE (OUTPATIENT)
Dept: INTERNAL MEDICINE | Facility: CLINIC | Age: 82
End: 2022-03-29

## 2022-03-29 LAB — CREAT BLDA-MCNC: 0.7 MG/DL (ref 0.6–1.3)

## 2022-03-29 RX ORDER — LEVOTHYROXINE SODIUM 75 MCG
75 TABLET ORAL DAILY
Qty: 90 TABLET | Refills: 2 | Status: SHIPPED | OUTPATIENT
Start: 2022-03-29 | End: 2022-09-08

## 2022-03-29 NOTE — TELEPHONE ENCOUNTER
Caller: Johana Corrigan    Relationship: Self    Best call back number: [phone] (H)    What is the best time to reach you: ANYTIME    Who are you requesting to speak with (clinical staff, provider,  specific staff member): CLINICAL STAFF     Do you know the name of the person who called:      What was the call regarding: PATIENT CALLED TO ADVISE THAT DR. ESCUDERO WANTED PATIENT TO SEE DR. BUNDY AND HE CAN NOT SEE HER UNTIL 04/29/22. PATIENT IS WANTING A CALLBACK TO GIVE HER ANOTHER NAME OF A PCP THAT SHE CAN SEE.     Do you require a callback:  YES         THANKS

## 2022-04-22 ENCOUNTER — APPOINTMENT (OUTPATIENT)
Dept: CT IMAGING | Facility: HOSPITAL | Age: 82
End: 2022-04-22

## 2022-04-22 ENCOUNTER — HOSPITAL ENCOUNTER (EMERGENCY)
Facility: HOSPITAL | Age: 82
Discharge: HOME | End: 2022-04-22
Attending: EMERGENCY MEDICINE | Admitting: EMERGENCY MEDICINE

## 2022-04-22 VITALS
RESPIRATION RATE: 15 BRPM | HEART RATE: 82 BPM | OXYGEN SATURATION: 97 % | DIASTOLIC BLOOD PRESSURE: 87 MMHG | TEMPERATURE: 97.7 F | SYSTOLIC BLOOD PRESSURE: 122 MMHG

## 2022-04-22 DIAGNOSIS — R55 SYNCOPE AND COLLAPSE: Primary | ICD-10-CM

## 2022-04-22 LAB
ALBUMIN SERPL-MCNC: 3.9 G/DL (ref 3.5–5.2)
ALBUMIN/GLOB SERPL: 1.9 G/DL
ALP SERPL-CCNC: 52 U/L (ref 39–117)
ALT SERPL W P-5'-P-CCNC: 14 U/L (ref 1–33)
ANION GAP SERPL CALCULATED.3IONS-SCNC: 12 MMOL/L (ref 5–15)
AST SERPL-CCNC: 16 U/L (ref 1–32)
BASOPHILS # BLD AUTO: 0.05 10*3/MM3 (ref 0–0.2)
BASOPHILS NFR BLD AUTO: 0.5 % (ref 0–1.5)
BILIRUB SERPL-MCNC: 0.3 MG/DL (ref 0–1.2)
BUN SERPL-MCNC: 14 MG/DL (ref 8–23)
BUN/CREAT SERPL: 17.5 (ref 7–25)
CALCIUM SPEC-SCNC: 8.9 MG/DL (ref 8.6–10.5)
CHLORIDE SERPL-SCNC: 100 MMOL/L (ref 98–107)
CO2 SERPL-SCNC: 24 MMOL/L (ref 22–29)
CREAT SERPL-MCNC: 0.8 MG/DL (ref 0.57–1)
DEPRECATED RDW RBC AUTO: 39.9 FL (ref 37–54)
EGFRCR SERPLBLD CKD-EPI 2021: 74.1 ML/MIN/1.73
EOSINOPHIL # BLD AUTO: 0.07 10*3/MM3 (ref 0–0.4)
EOSINOPHIL NFR BLD AUTO: 0.8 % (ref 0.3–6.2)
ERYTHROCYTE [DISTWIDTH] IN BLOOD BY AUTOMATED COUNT: 12.3 % (ref 12.3–15.4)
ETHANOL BLD-MCNC: <10 MG/DL (ref 0–10)
ETHANOL UR QL: <0.01 %
GLOBULIN UR ELPH-MCNC: 2.1 GM/DL
GLUCOSE SERPL-MCNC: 123 MG/DL (ref 65–99)
HCT VFR BLD AUTO: 39.5 % (ref 34–46.6)
HGB BLD-MCNC: 13.7 G/DL (ref 12–15.9)
IMM GRANULOCYTES # BLD AUTO: 0.03 10*3/MM3 (ref 0–0.05)
IMM GRANULOCYTES NFR BLD AUTO: 0.3 % (ref 0–0.5)
LYMPHOCYTES # BLD AUTO: 1.24 10*3/MM3 (ref 0.7–3.1)
LYMPHOCYTES NFR BLD AUTO: 13.6 % (ref 19.6–45.3)
MCH RBC QN AUTO: 31.1 PG (ref 26.6–33)
MCHC RBC AUTO-ENTMCNC: 34.7 G/DL (ref 31.5–35.7)
MCV RBC AUTO: 89.6 FL (ref 79–97)
MONOCYTES # BLD AUTO: 0.44 10*3/MM3 (ref 0.1–0.9)
MONOCYTES NFR BLD AUTO: 4.8 % (ref 5–12)
NEUTROPHILS NFR BLD AUTO: 7.32 10*3/MM3 (ref 1.7–7)
NEUTROPHILS NFR BLD AUTO: 80 % (ref 42.7–76)
NRBC BLD AUTO-RTO: 0 /100 WBC (ref 0–0.2)
NT-PROBNP SERPL-MCNC: 75 PG/ML (ref 0–1800)
PLATELET # BLD AUTO: 229 10*3/MM3 (ref 140–450)
PMV BLD AUTO: 9 FL (ref 6–12)
POTASSIUM SERPL-SCNC: 2.9 MMOL/L (ref 3.5–5.2)
PROT SERPL-MCNC: 6 G/DL (ref 6–8.5)
RBC # BLD AUTO: 4.41 10*6/MM3 (ref 3.77–5.28)
SODIUM SERPL-SCNC: 136 MMOL/L (ref 136–145)
TROPONIN T SERPL-MCNC: <0.01 NG/ML (ref 0–0.03)
WBC NRBC COR # BLD: 9.15 10*3/MM3 (ref 3.4–10.8)

## 2022-04-22 PROCEDURE — 93010 ELECTROCARDIOGRAM REPORT: CPT | Performed by: INTERNAL MEDICINE

## 2022-04-22 PROCEDURE — 93005 ELECTROCARDIOGRAM TRACING: CPT

## 2022-04-22 PROCEDURE — 82077 ASSAY SPEC XCP UR&BREATH IA: CPT | Performed by: EMERGENCY MEDICINE

## 2022-04-22 PROCEDURE — 83880 ASSAY OF NATRIURETIC PEPTIDE: CPT | Performed by: EMERGENCY MEDICINE

## 2022-04-22 PROCEDURE — 99284 EMERGENCY DEPT VISIT MOD MDM: CPT

## 2022-04-22 PROCEDURE — 36415 COLL VENOUS BLD VENIPUNCTURE: CPT

## 2022-04-22 PROCEDURE — 70450 CT HEAD/BRAIN W/O DYE: CPT

## 2022-04-22 PROCEDURE — 80053 COMPREHEN METABOLIC PANEL: CPT | Performed by: EMERGENCY MEDICINE

## 2022-04-22 PROCEDURE — 84484 ASSAY OF TROPONIN QUANT: CPT | Performed by: EMERGENCY MEDICINE

## 2022-04-22 PROCEDURE — 85025 COMPLETE CBC W/AUTO DIFF WBC: CPT | Performed by: EMERGENCY MEDICINE

## 2022-04-22 RX ORDER — SODIUM CHLORIDE 0.9 % (FLUSH) 0.9 %
10 SYRINGE (ML) INJECTION AS NEEDED
Status: DISCONTINUED | OUTPATIENT
Start: 2022-04-22 | End: 2022-04-23 | Stop reason: HOSPADM

## 2022-04-22 NOTE — ED PROVIDER NOTES
EMERGENCY DEPARTMENT ENCOUNTER    Room Number:  37/37  Date of encounter:  4/22/2022  PCP: Sola Gómez MD  Historian: Patient and spouse      HPI:  Chief Complaint: Syncopal events  A complete HPI/ROS/PMH/PSH/SH/FH are unobtainable due to: N/A    Context: Johana Corrigan is a 81 y.o. female who presents to the ED c/o syncopal events this afternoon.  She states she had done her usual morning routine-she had gone to breakfast with friends and had gone to the market.  She was standing in the hallway of her condominium building and talking with some neighbors when all of a sudden she felt very weak and needed to sit down.  Her  helped her back into the apartment and sat her down in a chair and went to get her a glass of water-he states that she had a brief (5 to 10 seconds) episode of unresponsiveness-her head went back and looked like she had a brief seizure.  Again, this only lasted 5 to 10 seconds and he does not report any significant postictal..  He then was going to help her into the bedroom, when she collapsed in the hallway.  He was able to catch her and help her to the floor.  She did not lose consciousness this time, but she did lose control of her bladder.  He then summoned EMS.  EMS reports that blood pressure was 92/58 on their arrival.  Currently, she states she feels weak but she denies headaches.  No fevers or chills.  No recent nausea, vomiting or diarrhea.  No chest pain, shortness of breath.  She is very active for 81 years and participates in multiple volunteer opportunities.      The patient was placed in a mask in triage, hand hygiene was performed before and after my interaction with the patient.  I wore a mask, safety glasses and gloves during my entire interaction with the patient.    PAST MEDICAL HISTORY  Active Ambulatory Problems     Diagnosis Date Noted   • Atopic rhinitis 06/29/2016   • Osteoarthritis 06/29/2016   • Hyperlipidemia 06/29/2016   • Hypothyroidism 06/29/2016   •  Leukopenia 2016   • Depressed mood 2017   • Essential hypertension 10/07/2019   • Mass of right lower extremity 10/18/2021     Resolved Ambulatory Problems     Diagnosis Date Noted   • Fatigue 2016   • Subclinical hypothyroidism 2016   • Wound abscess 10/18/2016     Past Medical History:   Diagnosis Date   • Allergic    • Arthritis    • Cataract    • Dyspnea    • Headache    • History of anemia    • Hypertension    • Hyperthyroidism          PAST SURGICAL HISTORY  Past Surgical History:   Procedure Laterality Date   • BRONCHOSCOPY N/A 2018    Procedure: BRONCHOSCOPY WITH;  Surgeon: Gee Kenney MD;  Location: [facility] ENDOSCOPY;  Service: Pulmonary   • CATARACT EXTRACTION Bilateral    • COLONOSCOPY     • EXCISION MASS LEG Right 2021    Procedure: LOWER EXTREMITY EXCISION LESION/CYST;  Surgeon: Anatoly Jones MD;  Location: [facility] OR [facility] [place];  Service: General;  Laterality: Right;   • FOOT SURGERY Left     bone spur   • KNEE ASPIRATION Right 2021    rt knee mass   • NOSE SURGERY     • SUBMANDIBULAR GLAND EXCISION     • TONSILLECTOMY     • TUBAL ABDOMINAL LIGATION           FAMILY HISTORY  Family History   Problem Relation Age of Onset   • Heart disease Father    • Heart disease Brother    • Heart disease Mother    • Malig Hyperthermia Neg Hx    • Breast cancer Neg Hx    • Ovarian cancer Neg Hx          SOCIAL HISTORY  Social History     Socioeconomic History   • Marital status:    Tobacco Use   • Smoking status: Former Smoker     Packs/day: 0.25     Years: 35.00     Pack years: 8.75     Types: Cigarettes     Quit date:      Years since quittin.3   • Smokeless tobacco: Never Used   Vaping Use   • Vaping Use: Never used   Substance and Sexual Activity   • Alcohol use: Yes     Alcohol/week: 7.0 standard drinks     Types: 7 Glasses of wine per week   • Drug use: Never   • Sexual activity: Defer         ALLERGIES  Patient has no known allergies.        REVIEW OF  SYSTEMS  Review of Systems   Constitutional: Negative for chills and fever.   Respiratory: Negative for chest tightness and shortness of breath.    Cardiovascular: Negative for chest pain.   Gastrointestinal: Negative for abdominal pain, diarrhea, nausea and vomiting.   Genitourinary: Negative for difficulty urinating and dysuria.   Neurological: Positive for dizziness, syncope and weakness. Negative for headaches.        All systems reviewed and negative except for those discussed in HPI.       PHYSICAL EXAM    I have reviewed the triage vital signs and nursing notes.    ED Triage Vitals [04/22/22 1912]   Temp Heart Rate Resp BP SpO2   97.7 °F (36.5 °C) 74 20 135/53 96 %      Temp src Heart Rate Source Patient Position BP Location FiO2 (%)   Oral Monitor -- -- --       Physical Exam   Constitutional: Pt. is oriented to person, place, and time and well-developed, well-nourished, and in no distress.   HENT: Normocephalic and atraumatic. Oropharynx moist/nonerythematous.  Neck: Normal range of motion. Neck supple. No JVD present.   Cardiovascular: Normal rate, regular rhythm and normal heart sounds. Exam reveals no gallop and no friction rub.   No murmur heard.  Pulmonary/Chest: Effort normal and breath sounds normal. No stridor. No respiratory distress. No wheezes, no rales.   Abdominal: Soft. Bowel sounds are normal. No distension. There is no tenderness. There is no rebound and no guarding.   Musculoskeletal: Normal range of motion.  Chronic edema of the left lower extremity which she states is unchanged.  Otherwise no tenderness or deformity.   Neurological: Pt. is alert and oriented to person, place, and time.  She has no focal neurologic deficits  Skin: Skin is warm and dry. No rash noted. Pt. is not diaphoretic. No erythema.   Psychiatric: Mood, affect and judgment normal.  She is pleasant and cooperative.  Nursing note and vitals reviewed.        LAB RESULTS  Recent Results (from the past 24 hour(s))   ECG 12  Lead    Collection Time: 04/22/22  7:20 PM   Result Value Ref Range    QT Interval 420 ms   Comprehensive Metabolic Panel    Collection Time: 04/22/22  8:14 PM    Specimen: Blood   Result Value Ref Range    Glucose 123 (H) 65 - 99 mg/dL    BUN 14 8 - 23 mg/dL    Creatinine 0.80 0.57 - 1.00 mg/dL    Sodium 136 136 - 145 mmol/L    Potassium 2.9 (L) 3.5 - 5.2 mmol/L    Chloride 100 98 - 107 mmol/L    CO2 24.0 22.0 - 29.0 mmol/L    Calcium 8.9 8.6 - 10.5 mg/dL    Total Protein 6.0 6.0 - 8.5 g/dL    Albumin 3.90 3.50 - 5.20 g/dL    ALT (SGPT) 14 1 - 33 U/L    AST (SGOT) 16 1 - 32 U/L    Alkaline Phosphatase 52 39 - 117 U/L    Total Bilirubin 0.3 0.0 - 1.2 mg/dL    Globulin 2.1 gm/dL    A/G Ratio 1.9 g/dL    BUN/Creatinine Ratio 17.5 7.0 - 25.0    Anion Gap 12.0 5.0 - 15.0 mmol/L    eGFR 74.1 >60.0 mL/min/1.73   Troponin    Collection Time: 04/22/22  8:14 PM    Specimen: Blood   Result Value Ref Range    Troponin T <0.010 0.000 - 0.030 ng/mL   BNP    Collection Time: 04/22/22  8:14 PM    Specimen: Blood   Result Value Ref Range    proBNP 75.0 0.0 - 1,800.0 pg/mL   CBC Auto Differential    Collection Time: 04/22/22  8:14 PM    Specimen: Blood   Result Value Ref Range    WBC 9.15 3.40 - 10.80 10*3/mm3    RBC 4.41 3.77 - 5.28 10*6/mm3    Hemoglobin 13.7 12.0 - 15.9 g/dL    Hematocrit 39.5 34.0 - 46.6 %    MCV 89.6 79.0 - 97.0 fL    MCH 31.1 26.6 - 33.0 pg    MCHC 34.7 31.5 - 35.7 g/dL    RDW 12.3 12.3 - 15.4 %    RDW-SD 39.9 37.0 - 54.0 fl    MPV 9.0 6.0 - 12.0 fL    Platelets 229 140 - 450 10*3/mm3    Neutrophil % 80.0 (H) 42.7 - 76.0 %    Lymphocyte % 13.6 (L) 19.6 - 45.3 %    Monocyte % 4.8 (L) 5.0 - 12.0 %    Eosinophil % 0.8 0.3 - 6.2 %    Basophil % 0.5 0.0 - 1.5 %    Immature Grans % 0.3 0.0 - 0.5 %    Neutrophils, Absolute 7.32 (H) 1.70 - 7.00 10*3/mm3    Lymphocytes, Absolute 1.24 0.70 - 3.10 10*3/mm3    Monocytes, Absolute 0.44 0.10 - 0.90 10*3/mm3    Eosinophils, Absolute 0.07 0.00 - 0.40 10*3/mm3    Basophils,  Absolute 0.05 0.00 - 0.20 10*3/mm3    Immature Grans, Absolute 0.03 0.00 - 0.05 10*3/mm3    nRBC 0.0 0.0 - 0.2 /100 WBC   Ethanol    Collection Time: 04/22/22  8:14 PM    Specimen: Blood   Result Value Ref Range    Ethanol <10 0 - 10 mg/dL    Ethanol % <0.010 %       Ordered the above labs and independently reviewed the results.        RADIOLOGY  CT Head Without Contrast    Result Date: 4/22/2022  CT HEAD WITHOUT CONTRAST  HISTORY: Syncope  TECHNIQUE:  Head CT includes axial imaging from the base of skull to the vertex without intravenous contrast. Radiation dose reduction techniques were utilized, including automated exposure control and exposure modulation based on body size.  COMPARISON:None  FINDINGS: There are no abnormal areas of increased attenuation intra-axially to suggest hemorrhage. No extra axial fluid collection is observed. There is no evidence for cerebral edema or mass effect or shift of the midline structures. There is mild atrophy and chronic small vessel ischemic white matter change.      No evidence for acute intracranial abnormality.  Findings called to Dr. Landa in the emergency department on 04/22/2022 at 9:40 PM.  This report was finalized on 4/22/2022 9:54 PM by Dr. Marquise Savage M.D.        I ordered the above noted radiological studies. Reviewed by me and discussed with radiologist.  See dictation for official radiology interpretation.      PROCEDURES    Procedures      MEDICATIONS GIVEN IN ER    Medications   sodium chloride 0.9 % flush 10 mL (has no administration in time range)         PROGRESS, DATA ANALYSIS, CONSULTS, AND MEDICAL DECISION MAKING    Any/all labs have been independently reviewed by me.  Any/all radiology studies have been reviewed by me and discussed with radiologist dictating the report.   EKG's independently viewed and interpreted by me.  Discussion below represents my analysis of pertinent findings related to patient's condition, differential diagnosis,  treatment plan and final disposition.    Number of Diagnoses or Management Options     Amount and/or Complexity of Data Reviewed  Clinical lab tests:  Yes  Tests in the radiology section of CPT®:  Yes  Tests in the medicine section of CPT®:  No  Review and summarize past medical records: yes (no recent ER visits/hospitalizations at [facility])  Independent visualization of images, tracings, or specimens: yes (see below)      ED Course as of 04/22/22 2202 Fri Apr 22, 2022 2140 CT of the brain was independently viewed by me and discussed with/interpreted by Dr. Savage (radiology)-there are no acute processes identified.  For official interpretation, see dictated report. [WC]   2200 Patient is not orthostatic.  She states she feels fine and would like to go home.  I did offer observation admission but she declined.  She states she will follow-up with her primary care physician on Monday.  I advised her to rest and take it easy over the weekend-I also advised her to return to the emergency department should the symptoms recur or for any problems. [WC]      ED Course User Index  [WC] Andi Landa MD       AS OF 22:02 EDT VITALS:    BP - 118/76  HR - 87  TEMP - 97.7 °F (36.5 °C) (Oral)  02 SATS - 96%        DIAGNOSIS  Final diagnoses:   Syncope and collapse         DISPOSITION  Discharged           Andi Landa MD  04/22/22 2202

## 2022-04-22 NOTE — ED TRIAGE NOTES
Pt was brought in by ems for near syncope. Pt was talking to friend when she became lightheaded, flushed and felt like she was going to pass out. Pt went inside and sat in chair. States that she attempted to get out of chair when she became dizzy and was assisted to ground by .  Reports continued dizziness and had bp 92/58. Responsive to fluids    Pt wearing mask on arrival. Staff wearing mask and goggles at time of triage.

## 2022-04-23 LAB — QT INTERVAL: 420 MS

## 2022-04-25 ENCOUNTER — OFFICE VISIT (OUTPATIENT)
Dept: INTERNAL MEDICINE | Facility: CLINIC | Age: 82
End: 2022-04-25

## 2022-04-25 VITALS
BODY MASS INDEX: 25.49 KG/M2 | HEART RATE: 74 BPM | SYSTOLIC BLOOD PRESSURE: 128 MMHG | WEIGHT: 135 LBS | HEIGHT: 61 IN | DIASTOLIC BLOOD PRESSURE: 58 MMHG

## 2022-04-25 DIAGNOSIS — R55 POSTURAL DIZZINESS WITH PRESYNCOPE: Primary | ICD-10-CM

## 2022-04-25 DIAGNOSIS — F41.9 ANXIOUS MOOD: ICD-10-CM

## 2022-04-25 DIAGNOSIS — R42 POSTURAL DIZZINESS WITH PRESYNCOPE: Primary | ICD-10-CM

## 2022-04-25 PROCEDURE — 99214 OFFICE O/P EST MOD 30 MIN: CPT | Performed by: INTERNAL MEDICINE

## 2022-04-25 RX ORDER — HYDROCHLOROTHIAZIDE 12.5 MG/1
12.5 TABLET ORAL DAILY
Qty: 90 TABLET | Refills: 3 | Status: SHIPPED | OUTPATIENT
Start: 2022-04-25 | End: 2022-06-24 | Stop reason: SDUPTHER

## 2022-04-25 NOTE — PROGRESS NOTES
Chief Complaint   Patient presents with   • Hospital Follow Up Visit   • presyncope       History of Present Illness   Johana Corrigan is a 81 y.o. female presents for ER follow up. Patient reports that she had a normal day with shopping for produce. Brought this home up her elevator. She then became weak, dizzy, and presyncopal. Did not have loc but did lose bladder control. To Er via EMS. bp was low on arrival of ambulance at 92/58. Hydrated. Neg CT head. Labs wnl.   Patient notes some increased stressors w/ spouse now at home as he has retired. Would like ot manage this w/ her current obligations.         The following portions of the patient's history were reviewed and updated as appropriate: allergies, current medications, past family history, past medical history, past social history, past surgical history and problem list.  Current Outpatient Medications on File Prior to Visit   Medication Sig Dispense Refill   • azelastine (ASTELIN) 0.1 % nasal spray 2 sprays into the nostril(s) as directed by provider 2 (Two) Times a Day. Use in each nostril as directed 1 each 12   • Calcium-Phosphorus-Vitamin D (CALCIUM/D3 ADULT GUMMIES PO) Take 2 tablets by mouth Every Morning.     • Cholecalciferol (Vitamin D) 50 MCG (2000 UT) tablet Take 2,000 Units by mouth Daily.     • fluticasone (Flonase) 50 MCG/ACT nasal spray 2 sprays into the nostril(s) as directed by provider Daily. 16 g 4   • losartan (Cozaar) 50 MG tablet Take 1 tablet by mouth Daily. 90 tablet 3   • montelukast (Singulair) 10 MG tablet Take 1 tablet by mouth Every Night. 90 tablet 3   • rosuvastatin (CRESTOR) 5 MG tablet Take 1 tablet by mouth Daily. 90 tablet 3   • Synthroid 75 MCG tablet Take 1 tablet by mouth Daily. 90 tablet 2   • [DISCONTINUED] hydroCHLOROthiazide (HYDRODIURIL) 25 MG tablet Take 1 tablet by mouth Daily. 90 tablet 1     No current facility-administered medications on file prior to visit.     Review of Systems   Constitutional: Negative.   "  HENT: Negative.    Eyes: Negative.    Respiratory: Negative.    Cardiovascular: Positive for leg swelling.   Gastrointestinal: Negative.    Endocrine: Negative.    Genitourinary: Negative.    Musculoskeletal: Negative.    Allergic/Immunologic: Negative.    Neurological: Positive for dizziness, syncope and light-headedness.   Hematological: Negative.    Psychiatric/Behavioral: Negative.        Objective   Physical Exam  Vitals and nursing note reviewed.   Constitutional:       Appearance: Normal appearance.   HENT:      Head: Normocephalic and atraumatic.      Right Ear: Tympanic membrane normal.      Left Ear: Tympanic membrane normal.      Nose: Nose normal.      Mouth/Throat:      Mouth: Mucous membranes are moist.   Eyes:      Extraocular Movements: Extraocular movements intact.      Pupils: Pupils are equal, round, and reactive to light.   Cardiovascular:      Rate and Rhythm: Normal rate and regular rhythm.      Pulses: Normal pulses.      Heart sounds: Normal heart sounds.   Pulmonary:      Effort: Pulmonary effort is normal.      Breath sounds: Normal breath sounds.   Abdominal:      General: Abdomen is flat.      Palpations: Abdomen is soft.   Musculoskeletal:         General: Normal range of motion.      Cervical back: Normal range of motion.   Skin:     General: Skin is warm and dry.   Neurological:      General: No focal deficit present.      Mental Status: She is alert and oriented to person, place, and time.   Psychiatric:         Mood and Affect: Mood normal.         Behavior: Behavior normal.         Thought Content: Thought content normal.         Judgment: Judgment normal.          /58   Pulse 74   Ht 154.9 cm (61\")   Wt 61.2 kg (135 lb)   BMI 25.51 kg/m²     Assessment/Plan   Diagnoses and all orders for this visit:    Postural dizziness with presyncope  -     Holter Monitor - 48 Hour; Future  -     Adult Transthoracic Echo Complete W/ Cont if Necessary Per Protocol; Future  -     " Ambulatory Referral to Cardiology    Anxious mood  -     Ambulatory Referral to Psychology    Other orders  -     hydroCHLOROthiazide (HYDRODIURIL) 12.5 MG tablet; Take 1 tablet by mouth Daily.      Patient w/ presyncopal episode. Suspect vasovagal in nature. She will reduce hctz to 12.5 mg. If sbp is <120 will decrease losartan to 25 mg. Given these symptoms would prefer sbp to run 130-140. D/w patient at length to lie down immediately if dizzy. She is strongly encouraged to increase hydration and to do this early in the morning. She is to utilize a compression stocking given veinous stasis. F/u prn. Gave lit on vasovagal syncope.

## 2022-05-05 ENCOUNTER — OFFICE VISIT (OUTPATIENT)
Dept: CARDIOLOGY | Facility: CLINIC | Age: 82
End: 2022-05-05

## 2022-05-05 VITALS
BODY MASS INDEX: 25.49 KG/M2 | SYSTOLIC BLOOD PRESSURE: 118 MMHG | DIASTOLIC BLOOD PRESSURE: 70 MMHG | WEIGHT: 135 LBS | HEART RATE: 63 BPM | HEIGHT: 61 IN

## 2022-05-05 DIAGNOSIS — E78.5 HYPERLIPIDEMIA, UNSPECIFIED HYPERLIPIDEMIA TYPE: ICD-10-CM

## 2022-05-05 DIAGNOSIS — I10 ESSENTIAL HYPERTENSION: ICD-10-CM

## 2022-05-05 DIAGNOSIS — I87.2 VENOUS INSUFFICIENCY: ICD-10-CM

## 2022-05-05 DIAGNOSIS — R55 POSTURAL DIZZINESS WITH PRESYNCOPE: Primary | ICD-10-CM

## 2022-05-05 DIAGNOSIS — R42 POSTURAL DIZZINESS WITH PRESYNCOPE: Primary | ICD-10-CM

## 2022-05-05 PROCEDURE — 99204 OFFICE O/P NEW MOD 45 MIN: CPT | Performed by: INTERNAL MEDICINE

## 2022-05-05 PROCEDURE — 93000 ELECTROCARDIOGRAM COMPLETE: CPT | Performed by: INTERNAL MEDICINE

## 2022-05-05 RX ORDER — LOSARTAN POTASSIUM 25 MG/1
25 TABLET ORAL DAILY
Qty: 90 TABLET | Refills: 3 | Status: SHIPPED | OUTPATIENT
Start: 2022-05-05 | End: 2022-06-24 | Stop reason: SDUPTHER

## 2022-05-05 NOTE — PATIENT INSTRUCTIONS
Decrease losartan from 50 mg to 25 mg  Continue to monitor blood pressure if blood pressure remains consistently below 120 mmHg (Top number) then let us know and we may stop losartan  Will follow up on Echo and Holter monitor

## 2022-05-05 NOTE — PROGRESS NOTES
[facility] New Patient Office Note     Encounter Date:22  Patient:Johana Corrigan  :1940  MRN:[id number]    Referring Provider: Sola Gómez MD    Consulted for: Presyncope, hypotension    Chief Complaint:   Chief Complaint   Patient presents with   • Dizziness   • Loss of Consciousness       History of Presenting Illness:      Ms. Corrigan is a 81 y.o. woman with past medical history notable for hypertension, venous insufficiency, and mixed hyperlipidemia who presents to our office for initial evaluation regarding recent episode of presyncopal syncopal episode requiring emergency room visit.  Of note patient had been doing fairly well.  Up until a few weeks ago when she was getting up moving around and she felt dizzy and lightheaded.  She tried to find a place to sit down but could not find a place and eventually passed out.  EMS was called she was brought to the emergency room.  In route she was noted to have blood pressures in the 90 systolic range and symptomatically she improved after receiving IV fluids.  She did follow-up with her primary care physician who appropriately cut her hydrochlorothiazide in half and ordered an echocardiogram and Holter monitor.  She is now seeing us as part of her work-up.  Not had any prior issues with presyncopal or syncopal spells.        Review of Systems:  Review of Systems   Constitutional: Negative.   HENT: Negative.    Eyes: Negative.    Cardiovascular: Positive for leg swelling and syncope.   Respiratory: Negative.    Endocrine: Negative.    Hematologic/Lymphatic: Negative.    Skin: Negative.    Musculoskeletal: Negative.    Gastrointestinal: Negative.    Genitourinary: Negative.    Psychiatric/Behavioral: Negative.    Allergic/Immunologic: Negative.        Current Outpatient Medications on File Prior to Visit   Medication Sig Dispense Refill   • azelastine (ASTELIN) 0.1 % nasal spray 2 sprays into the nostril(s) as directed by provider 2 (Two)  Times a Day. Use in each nostril as directed 1 each 12   • Calcium-Phosphorus-Vitamin D (CALCIUM/D3 ADULT GUMMIES PO) Take 2 tablets by mouth Every Morning.     • hydroCHLOROthiazide (HYDRODIURIL) 12.5 MG tablet Take 1 tablet by mouth Daily. 90 tablet 3   • montelukast (Singulair) 10 MG tablet Take 1 tablet by mouth Every Night. 90 tablet 3   • rosuvastatin (CRESTOR) 5 MG tablet Take 1 tablet by mouth Daily. 90 tablet 3   • Synthroid 75 MCG tablet Take 1 tablet by mouth Daily. 90 tablet 2   • [DISCONTINUED] losartan (Cozaar) 50 MG tablet Take 1 tablet by mouth Daily. 90 tablet 3   • [DISCONTINUED] Cholecalciferol (Vitamin D) 50 MCG (2000 UT) tablet Take 2,000 Units by mouth Daily.     • [DISCONTINUED] fluticasone (Flonase) 50 MCG/ACT nasal spray 2 sprays into the nostril(s) as directed by provider Daily. 16 g 4     No current facility-administered medications on file prior to visit.       No Known Allergies    Past Medical History:   Diagnosis Date   • Allergic    • Arthritis    • Cataract    • Dyspnea    • Headache    • History of anemia    • Hyperlipidemia    • Hypertension    • Hyperthyroidism    • Leukopenia    • Osteoarthritis        Past Surgical History:   Procedure Laterality Date   • BRONCHOSCOPY N/A 12/13/2018    Procedure: BRONCHOSCOPY WITH;  Surgeon: Gee Kenney MD;  Location: [facility] ENDOSCOPY;  Service: Pulmonary   • CATARACT EXTRACTION Bilateral    • COLONOSCOPY     • EXCISION MASS LEG Right 11/5/2021    Procedure: LOWER EXTREMITY EXCISION LESION/CYST;  Surgeon: Anatoly Jones MD;  Location: [facility] [place] [facility] [place];  Service: General;  Laterality: Right;   • FOOT SURGERY Left     bone spur   • KNEE ASPIRATION Right 09/22/2021    rt knee mass   • NOSE SURGERY     • SUBMANDIBULAR GLAND EXCISION     • TONSILLECTOMY     • TUBAL ABDOMINAL LIGATION         Social History     Socioeconomic History   • Marital status:    Tobacco Use   • Smoking status: Former Smoker     Packs/day: 0.25     Years: 35.00      "Pack years: 8.75     Types: Cigarettes     Quit date:      Years since quittin.3   • Smokeless tobacco: Never Used   Vaping Use   • Vaping Use: Never used   Substance and Sexual Activity   • Alcohol use: Yes     Alcohol/week: 7.0 standard drinks     Types: 7 Glasses of wine per week     Comment: caffeine use   • Drug use: Never   • Sexual activity: Defer       Family History   Problem Relation Age of Onset   • Heart disease Father    • Heart disease Brother    • Heart disease Mother    • Malig Hyperthermia Neg Hx    • Breast cancer Neg Hx    • Ovarian cancer Neg Hx        The following portions of the patient's history were reviewed and updated as appropriate: allergies, current medications, past family history, past medical history, past social history, past surgical history and problem list.       Objective:       Vitals:    22 1358   BP: 118/70   BP Location: Left arm   Patient Position: Sitting   Pulse: 63   Weight: 61.2 kg (135 lb)   Height: 154.9 cm (61\")       Body mass index is 25.51 kg/m².    Physical Exam:  Constitutional: Well appearing, Well-developed, No acute distress   HENT: Oropharynx clear and membrane moist  Eyes: Normal conjunctiva, no sclera icterus.  Neck: Supple, no carotid bruit bilaterally.  Cardiovascular: Regular rate and rhythm, No Murmur, No bilateral lower extremity edema.  Pulmonary: Normal respiratory effort, Normal lung sounds, no wheezing.  Abdominal: Soft, nontender, no hepatosplenomegaly, liver is non-pulsatile.  Neurological: Alert and orient x 3.   Skin: Warm, dry, no ecchymosis, no rash.  Psych: Appropriate mood and affect. Normal judgment and insight.      Lab Results   Component Value Date     2022     2022    K 2.9 (L) 2022    K 3.5 2022     2022    CL 96 2022    CO2 24.0 2022    CO2 23 2022    BUN 14 2022    BUN 16 2022    CREATININE 0.80 2022    CREATININE 0.70 2022    " EGFRIFNONA 78 11/03/2021    EGFRIFNONA 77 06/01/2021    EGFRIFAFRI 93 06/01/2021    EGFRIFAFRI 92 01/12/2021    GLUCOSE 123 (H) 04/22/2022    GLUCOSE 91 03/18/2022    CALCIUM 8.9 04/22/2022    CALCIUM 9.6 03/18/2022    PROTENTOTREF 6.4 03/18/2022    PROTENTOTREF 6.2 06/01/2021    ALBUMIN 3.90 04/22/2022    ALBUMIN 4.4 03/18/2022    BILITOT 0.3 04/22/2022    BILITOT 0.3 03/18/2022    AST 16 04/22/2022    AST 30 03/18/2022    ALT 14 04/22/2022    ALT 18 03/18/2022     Lab Results   Component Value Date    WBC 9.15 04/22/2022    WBC 5.8 03/18/2022    HGB 13.7 04/22/2022    HGB 13.6 03/18/2022    HCT 39.5 04/22/2022    HCT 40.0 03/18/2022    MCV 89.6 04/22/2022    MCV 91 03/18/2022     04/22/2022     03/18/2022     Lab Results   Component Value Date    TRIG 180 (H) 06/01/2021    TRIG 70 01/12/2021    HDL 76 (H) 06/01/2021    HDL 80 (H) 01/12/2021    LDL 71 06/01/2021     (H) 01/12/2021     Lab Results   Component Value Date    PROBNP 75.0 04/22/2022     Lab Results   Component Value Date    TROPONINT <0.010 04/22/2022     Lab Results   Component Value Date    TSH 1.390 03/18/2022    TSH 1.720 06/01/2021         ECG 12 Lead    Date/Time: 5/5/2022 4:22 PM  Performed by: Tera Treadwell MD  Authorized by: Tera Treadwell MD   Comparison: compared with previous ECG from 4/22/2022  Similar to previous ECG  Rhythm: sinus rhythm  Conduction: right bundle branch block        Treadmill stress test 6/17/2021:  · Arrhythmias were not significant during stress.  · Findings consistent with a normal ECG stress test.    Transesophageal echocardiogram 3/18/2019:  · Estimated EF appears to be in the range of 56 - 60%.  · Mild mitral valve regurgitation is present  · There was no right atrail mass seen.    Echocardiogram 12/12/2018:  · There is an echodensity near the septum of the right atrium which is seen in multiple views. This may represent artifact, although a mass or congenital membrane cannot be  excluded. Would recommend LASHAE if clinically indicated  · Left ventricular systolic function is normal. Calculated EF = 63%.  · Left ventricular diastolic function is normal.  · Normal right ventricular cavity size and systolic function noted.  · Interatrial septal aneurysm present. Saline test results are negative.  · Mild mitral valve regurgitation is present  · The calculated RVSP is 24 mm Hg.  · There is no evidence of pericardial effusion.          Assessment:          Diagnosis Plan   1. Postural dizziness with presyncope  ECG 12 Lead   2. Essential hypertension     3. Venous insufficiency     4. Hyperlipidemia, unspecified hyperlipidemia type            Plan:       Ms. Corrigan is a 81 y.o. woman with past medical history notable for hypertension, venous insufficiency, and mixed hyperlipidemia who presents to our office for initial evaluation regarding recent episode of presyncopal syncopal episode requiring emergency room visit.  Her presentation seems fairly consistent with orthostasis or low blood pressure.  I agree with decreasing her hydrochlorothiazide which is already been done by her primary care physician.  I also agree that checking echocardiogram and Holter would not be unreasonable given her underlying right bundle branch block and to ensure noes new structural changes.  That being said her blood pressure is still fairly on the low side I think we can continue to de-escalate her losartan.  We will decrease from 50 mg down to 25 mg which was already planned by her primary care physician which I think is appropriate since her blood pressure is still less than 120 mmHg.  Can follow-up on the above tests and potentially even further de-escalate her regimen if need be if blood pressure remains on the low side to avoid future events.  Otherwise I also reinforced the need for compression socks to help manage her venous insufficiency in her left leg which also may be partially a contributor if she is having  significant dependent edema.    Syncope and presyncope:  · Will decrease losartan from 50 mg down to 25 mg  · Follow-up on echocardiogram and Holter monitor    Essential hypertension:  · Blood pressure remains on the lower side will decrease losartan from 50 mg down to 25 mg potentially could even stop this medication if blood pressure remains low    Venous insufficiency:  · Recommend compression socks similar recommendations to vascular surgery    Mixed hyperlipidemia:  · Continue statin therapy    Follow-up:  3 months    Thank you for allowing me to participate in the care of Johana Corrigan. Feel free to contact me directly with any further questions or concerns.    Tera Treadwell MD  [facility]  05/05/22  16:29 EDT

## 2022-05-20 ENCOUNTER — TELEPHONE (OUTPATIENT)
Dept: INTERNAL MEDICINE | Facility: CLINIC | Age: 82
End: 2022-05-20

## 2022-05-20 ENCOUNTER — HOSPITAL ENCOUNTER (OUTPATIENT)
Dept: CARDIOLOGY | Facility: HOSPITAL | Age: 82
Discharge: HOME | End: 2022-05-20
Admitting: INTERNAL MEDICINE

## 2022-05-20 VITALS
DIASTOLIC BLOOD PRESSURE: 59 MMHG | WEIGHT: 135 LBS | BODY MASS INDEX: 25.49 KG/M2 | HEIGHT: 61 IN | SYSTOLIC BLOOD PRESSURE: 136 MMHG | HEART RATE: 60 BPM

## 2022-05-20 DIAGNOSIS — R55 POSTURAL DIZZINESS WITH PRESYNCOPE: ICD-10-CM

## 2022-05-20 DIAGNOSIS — R42 POSTURAL DIZZINESS WITH PRESYNCOPE: ICD-10-CM

## 2022-05-20 LAB
ASCENDING AORTA: 2.8 CM
BH CV ECHO MEAS - ACS: 2 CM
BH CV ECHO MEAS - AO MAX PG: 5.8 MMHG
BH CV ECHO MEAS - AO MEAN PG: 2.33 MMHG
BH CV ECHO MEAS - AO ROOT DIAM: 3.1 CM
BH CV ECHO MEAS - AO V2 MAX: 120.2 CM/SEC
BH CV ECHO MEAS - AO V2 VTI: 27.8 CM
BH CV ECHO MEAS - AVA(I,D): 2.35 CM2
BH CV ECHO MEAS - EDV(CUBED): 74.2 ML
BH CV ECHO MEAS - EDV(MOD-SP2): 50 ML
BH CV ECHO MEAS - EDV(MOD-SP4): 41 ML
BH CV ECHO MEAS - EF(MOD-BP): 61.4 %
BH CV ECHO MEAS - EF(MOD-SP2): 64 %
BH CV ECHO MEAS - EF(MOD-SP4): 56.1 %
BH CV ECHO MEAS - ESV(CUBED): 20.9 ML
BH CV ECHO MEAS - ESV(MOD-SP2): 18 ML
BH CV ECHO MEAS - ESV(MOD-SP4): 18 ML
BH CV ECHO MEAS - FS: 34.5 %
BH CV ECHO MEAS - IVS/LVPW: 1.11 CM
BH CV ECHO MEAS - IVSD: 0.84 CM
BH CV ECHO MEAS - LAT PEAK E' VEL: 7.5 CM/SEC
BH CV ECHO MEAS - LV MASS(C)D: 101.2 GRAMS
BH CV ECHO MEAS - LV MAX PG: 3.1 MMHG
BH CV ECHO MEAS - LV MEAN PG: 1.54 MMHG
BH CV ECHO MEAS - LV V1 MAX: 88.6 CM/SEC
BH CV ECHO MEAS - LV V1 VTI: 21.1 CM
BH CV ECHO MEAS - LVIDD: 4.2 CM
BH CV ECHO MEAS - LVIDS: 2.8 CM
BH CV ECHO MEAS - LVOT AREA: 3.1 CM2
BH CV ECHO MEAS - LVOT DIAM: 1.99 CM
BH CV ECHO MEAS - LVPWD: 0.76 CM
BH CV ECHO MEAS - MED PEAK E' VEL: 7.4 CM/SEC
BH CV ECHO MEAS - MV A DUR: 0.13 SEC
BH CV ECHO MEAS - MV A MAX VEL: 81 CM/SEC
BH CV ECHO MEAS - MV DEC SLOPE: 544.3 CM/SEC2
BH CV ECHO MEAS - MV DEC TIME: 0.21 MSEC
BH CV ECHO MEAS - MV E MAX VEL: 114 CM/SEC
BH CV ECHO MEAS - MV E/A: 1.41
BH CV ECHO MEAS - MV MAX PG: 6.9 MMHG
BH CV ECHO MEAS - MV MEAN PG: 2.37 MMHG
BH CV ECHO MEAS - MV P1/2T: 65.7 MSEC
BH CV ECHO MEAS - MV V2 VTI: 36.1 CM
BH CV ECHO MEAS - MVA(P1/2T): 3.3 CM2
BH CV ECHO MEAS - MVA(VTI): 1.81 CM2
BH CV ECHO MEAS - PA ACC TIME: 0.11 SEC
BH CV ECHO MEAS - PA PR(ACCEL): 30.3 MMHG
BH CV ECHO MEAS - PA V2 MAX: 74.7 CM/SEC
BH CV ECHO MEAS - PULM A REVS DUR: 0.13 SEC
BH CV ECHO MEAS - PULM A REVS VEL: 35 CM/SEC
BH CV ECHO MEAS - PULM DIAS VEL: 46.5 CM/SEC
BH CV ECHO MEAS - PULM S/D: 0.95
BH CV ECHO MEAS - PULM SYS VEL: 44.3 CM/SEC
BH CV ECHO MEAS - RAP SYSTOLE: 3 MMHG
BH CV ECHO MEAS - RVSP: 23 MMHG
BH CV ECHO MEAS - SV(LVOT): 65.5 ML
BH CV ECHO MEAS - SV(MOD-SP2): 32 ML
BH CV ECHO MEAS - SV(MOD-SP4): 23 ML
BH CV ECHO MEAS - TAPSE (>1.6): 1.57 CM
BH CV ECHO MEAS - TR MAX PG: 20.6 MMHG
BH CV ECHO MEAS - TR MAX VEL: 226.8 CM/SEC
BH CV ECHO MEASUREMENTS AVERAGE E/E' RATIO: 15.3
BH CV XLRA - RV BASE: 2.46 CM
BH CV XLRA - RV LENGTH: 5.1 CM
BH CV XLRA - RV MID: 2.3 CM
BH CV XLRA - TDI S': 9.5 CM/SEC
MAXIMAL PREDICTED HEART RATE: 139 BPM
SINUS: 2.9 CM
STJ: 3 CM
STRESS TARGET HR: 118 BPM

## 2022-05-20 PROCEDURE — 93306 TTE W/DOPPLER COMPLETE: CPT

## 2022-05-20 PROCEDURE — 93306 TTE W/DOPPLER COMPLETE: CPT | Performed by: INTERNAL MEDICINE

## 2022-05-20 NOTE — TELEPHONE ENCOUNTER
Pt informed      ----- Message from Sola Gómez MD sent at 5/20/2022  3:36 PM EDT -----  Normal heart echocardiogram

## 2022-06-24 RX ORDER — HYDROCHLOROTHIAZIDE 12.5 MG/1
12.5 TABLET ORAL DAILY
Qty: 90 TABLET | Refills: 3 | Status: SHIPPED | OUTPATIENT
Start: 2022-06-24 | End: 2023-04-03

## 2022-06-24 RX ORDER — MONTELUKAST SODIUM 10 MG/1
10 TABLET ORAL NIGHTLY
Qty: 90 TABLET | Refills: 3 | Status: SHIPPED | OUTPATIENT
Start: 2022-06-24

## 2022-06-24 RX ORDER — LOSARTAN POTASSIUM 25 MG/1
25 TABLET ORAL DAILY
Qty: 90 TABLET | Refills: 3 | Status: SHIPPED | OUTPATIENT
Start: 2022-06-24

## 2022-07-25 ENCOUNTER — OFFICE VISIT (OUTPATIENT)
Dept: INTERNAL MEDICINE | Facility: CLINIC | Age: 82
End: 2022-07-25

## 2022-07-25 VITALS
DIASTOLIC BLOOD PRESSURE: 64 MMHG | WEIGHT: 133 LBS | SYSTOLIC BLOOD PRESSURE: 134 MMHG | HEART RATE: 73 BPM | BODY MASS INDEX: 25.11 KG/M2 | HEIGHT: 61 IN

## 2022-07-25 DIAGNOSIS — E55.9 VITAMIN D DEFICIENCY: ICD-10-CM

## 2022-07-25 DIAGNOSIS — E87.6 HYPOKALEMIA: ICD-10-CM

## 2022-07-25 DIAGNOSIS — R53.83 FATIGUE, UNSPECIFIED TYPE: Primary | ICD-10-CM

## 2022-07-25 DIAGNOSIS — I10 ESSENTIAL HYPERTENSION: ICD-10-CM

## 2022-07-25 DIAGNOSIS — E03.9 ACQUIRED HYPOTHYROIDISM: ICD-10-CM

## 2022-07-25 PROCEDURE — 99214 OFFICE O/P EST MOD 30 MIN: CPT | Performed by: INTERNAL MEDICINE

## 2022-07-25 NOTE — PROGRESS NOTES
"Chief Complaint   Patient presents with   • Fatigue   • rattle in lower left dise       History of Present Illness   Johana Corrigan is a 81 y.o. female presents for acute needs. Patient reports that when she is seated at times she will note a \"rattle\" in the left side of her chest. This is intermittent in nature. Not present today. She feels no pain or shortness of breath with this. She notes \"it was just a sound\".   Additional complaint of fatigue. She has reduced exercise tolerance at the gym. \"feels like I could sit down at any time and fall asleep\".   She notes that bp has been low on one occasion. This was at an outdoor [employer] party in 90 degree heat.     The following portions of the patient's history were reviewed and updated as appropriate: allergies, current medications, past family history, past medical history, past social history, past surgical history and problem list.  Current Outpatient Medications on File Prior to Visit   Medication Sig Dispense Refill   • azelastine (ASTELIN) 0.1 % nasal spray 2 sprays into the nostril(s) as directed by provider 2 (Two) Times a Day. Use in each nostril as directed 1 each 12   • Calcium-Phosphorus-Vitamin D (CALCIUM/D3 ADULT GUMMIES PO) Take 2 tablets by mouth Every Morning.     • hydroCHLOROthiazide (HYDRODIURIL) 12.5 MG tablet Take 1 tablet by mouth Daily. 90 tablet 3   • losartan (Cozaar) 25 MG tablet Take 1 tablet by mouth Daily. 90 tablet 3   • montelukast (Singulair) 10 MG tablet Take 1 tablet by mouth Every Night. 90 tablet 3   • rosuvastatin (CRESTOR) 5 MG tablet Take 1 tablet by mouth Daily. 90 tablet 3   • Synthroid 75 MCG tablet Take 1 tablet by mouth Daily. 90 tablet 2     No current facility-administered medications on file prior to visit.     Review of Systems   Constitutional: Negative.    HENT: Negative.    Eyes: Negative.    Respiratory: Negative.    Cardiovascular: Negative.    Gastrointestinal: Negative.    Endocrine: Negative.    Genitourinary: " "Negative.    Musculoskeletal: Negative.    Skin: Negative.    Allergic/Immunologic: Negative.    Neurological: Negative.    Hematological: Negative.    Psychiatric/Behavioral: Negative.        Objective   Physical Exam  Vitals and nursing note reviewed.   Constitutional:       Appearance: Normal appearance.   HENT:      Head: Normocephalic and atraumatic.      Right Ear: Tympanic membrane normal.      Left Ear: Tympanic membrane normal.      Nose: Nose normal.      Mouth/Throat:      Mouth: Mucous membranes are moist.   Eyes:      Extraocular Movements: Extraocular movements intact.      Pupils: Pupils are equal, round, and reactive to light.   Cardiovascular:      Rate and Rhythm: Normal rate and regular rhythm.      Pulses: Normal pulses.      Heart sounds: Normal heart sounds.   Pulmonary:      Effort: Pulmonary effort is normal.      Breath sounds: Normal breath sounds.   Abdominal:      General: Abdomen is flat.      Palpations: Abdomen is soft.   Musculoskeletal:         General: Normal range of motion.      Cervical back: Normal range of motion.   Skin:     General: Skin is warm and dry.   Neurological:      General: No focal deficit present.      Mental Status: She is alert and oriented to person, place, and time.   Psychiatric:         Mood and Affect: Mood normal.         Behavior: Behavior normal.         Thought Content: Thought content normal.         Judgment: Judgment normal.          /64   Pulse 73   Ht 154.9 cm (61\")   Wt 60.3 kg (133 lb)   BMI 25.13 kg/m²     Assessment & Plan   Diagnoses and all orders for this visit:    Fatigue, unspecified type  -     Basic Metabolic Panel  -     TSH  -     T4, Free  -     Vitamin D 25 Hydroxy  -     Vitamin B12  -     Folate  -     Magnesium    Vitamin D deficiency  -     Basic Metabolic Panel  -     TSH  -     T4, Free  -     Vitamin D 25 Hydroxy  -     Vitamin B12  -     Folate  -     Magnesium    Acquired hypothyroidism  -     Basic Metabolic " Panel  -     TSH  -     T4, Free  -     Vitamin D 25 Hydroxy  -     Vitamin B12  -     Folate  -     Magnesium    Hypokalemia  -     Basic Metabolic Panel  -     TSH  -     T4, Free  -     Vitamin D 25 Hydroxy  -     Vitamin B12  -     Folate  -     Magnesium    Essential hypertension    patient w/ hypertension, hypothyroidism, and fatigue. She did note low bp on one occasion. She will only take hctz in a prn fashion when has swelling. She should not take on days when she will be in the heat or particularly active. Will test potassium and magnesium levels. She will have thyroid b12 and folate levels tested as well. She is to hydrate well, elevate legs, and utilize compression stockings when tolerated particularly w/ road trips. She will f/u here as scheduled or prn.

## 2022-07-26 LAB
25(OH)D3+25(OH)D2 SERPL-MCNC: 55.3 NG/ML (ref 30–100)
BUN SERPL-MCNC: 11 MG/DL (ref 8–27)
BUN/CREAT SERPL: 15 (ref 12–28)
CALCIUM SERPL-MCNC: 9.7 MG/DL (ref 8.7–10.3)
CHLORIDE SERPL-SCNC: 94 MMOL/L (ref 96–106)
CO2 SERPL-SCNC: 23 MMOL/L (ref 20–29)
CREAT SERPL-MCNC: 0.75 MG/DL (ref 0.57–1)
EGFRCR SERPLBLD CKD-EPI 2021: 80 ML/MIN/1.73
FOLATE SERPL-MCNC: 8.7 NG/ML
GLUCOSE SERPL-MCNC: 84 MG/DL (ref 65–99)
MAGNESIUM SERPL-MCNC: 2 MG/DL (ref 1.6–2.3)
POTASSIUM SERPL-SCNC: 4.3 MMOL/L (ref 3.5–5.2)
SODIUM SERPL-SCNC: 132 MMOL/L (ref 134–144)
T4 FREE SERPL-MCNC: 1.84 NG/DL (ref 0.82–1.77)
TSH SERPL DL<=0.005 MIU/L-ACNC: 0.9 UIU/ML (ref 0.45–4.5)
VIT B12 SERPL-MCNC: 504 PG/ML (ref 232–1245)

## 2022-08-14 DIAGNOSIS — I10 ESSENTIAL HYPERTENSION: Primary | ICD-10-CM

## 2022-08-14 DIAGNOSIS — E03.4 HYPOTHYROIDISM DUE TO ACQUIRED ATROPHY OF THYROID: ICD-10-CM

## 2022-08-19 LAB
BUN SERPL-MCNC: 13 MG/DL (ref 8–27)
BUN/CREAT SERPL: 17 (ref 12–28)
CALCIUM SERPL-MCNC: 9.9 MG/DL (ref 8.7–10.3)
CHLORIDE SERPL-SCNC: 96 MMOL/L (ref 96–106)
CO2 SERPL-SCNC: 22 MMOL/L (ref 20–29)
CREAT SERPL-MCNC: 0.77 MG/DL (ref 0.57–1)
EGFRCR-CYS SERPLBLD CKD-EPI 2021: 77 ML/MIN/1.73
GLUCOSE SERPL-MCNC: 87 MG/DL (ref 65–99)
POTASSIUM SERPL-SCNC: 4.5 MMOL/L (ref 3.5–5.2)
SODIUM SERPL-SCNC: 136 MMOL/L (ref 134–144)
T4 FREE SERPL-MCNC: 1.84 NG/DL (ref 0.82–1.77)
TSH SERPL DL<=0.005 MIU/L-ACNC: 1.34 UIU/ML (ref 0.45–4.5)

## 2022-08-26 ENCOUNTER — OFFICE VISIT (OUTPATIENT)
Dept: INTERNAL MEDICINE | Facility: CLINIC | Age: 82
End: 2022-08-26

## 2022-08-26 VITALS
SYSTOLIC BLOOD PRESSURE: 130 MMHG | WEIGHT: 137 LBS | HEIGHT: 61 IN | HEART RATE: 68 BPM | BODY MASS INDEX: 25.86 KG/M2 | DIASTOLIC BLOOD PRESSURE: 60 MMHG

## 2022-08-26 DIAGNOSIS — I10 ESSENTIAL HYPERTENSION: Primary | ICD-10-CM

## 2022-08-26 DIAGNOSIS — E03.9 HYPOTHYROIDISM, UNSPECIFIED TYPE: ICD-10-CM

## 2022-08-26 PROCEDURE — 99214 OFFICE O/P EST MOD 30 MIN: CPT | Performed by: INTERNAL MEDICINE

## 2022-08-26 NOTE — PROGRESS NOTES
Chief Complaint   Patient presents with   • Hypertension   • Hypothyroidism       History of Present Illness   Johana Corrigan is a 81 y.o. female presents for follow up evaluation. She is doing well today. She has hypertension and hyperlipidemia. She is feeling well today. She monitors bp. In general 120s/70s when tested. She has reduced hctz to 12.5 mg and has tolerated this well. She has hypothyroidism. tsh at goal. Free t4 slightly elevated. She notes some continued fatigue. This is related to multiple stressors. 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           The following portions of the patient's history were reviewed and updated as appropriate: allergies, current medications, past family history, past medical history, past social history, past surgical history and problem list.  Current Outpatient Medications on File Prior to Visit   Medication Sig Dispense Refill   • azelastine (ASTELIN) 0.1 % nasal spray 2 sprays into the nostril(s) as directed by provider 2 (Two) Times a Day. Use in each nostril as directed 1 each 12   • Calcium-Phosphorus-Vitamin D (CALCIUM/D3 ADULT GUMMIES PO) Take 2 tablets by mouth Every Morning.     • hydroCHLOROthiazide (HYDRODIURIL) 12.5 MG tablet Take 1 tablet by mouth Daily. 90 tablet 3   • losartan (Cozaar) 25 MG tablet Take 1 tablet by mouth Daily. 90 tablet 3   • montelukast (Singulair) 10 MG tablet Take 1 tablet by mouth Every Night. 90 tablet 3   • rosuvastatin (CRESTOR) 5 MG tablet Take 1 tablet by mouth Daily. 90 tablet 3   • Synthroid 75 MCG tablet Take 1 tablet by mouth Daily. 90 tablet 2     No current facility-administered medications on file prior to visit.     Review of Systems   Constitutional: Negative.    HENT: Negative.    Eyes: Negative.    Respiratory: Negative.    Cardiovascular: Negative.    Gastrointestinal: Negative.    Endocrine: Negative.    Genitourinary: Negative.    Musculoskeletal: Negative.    Skin: Negative.   "  Allergic/Immunologic: Negative.    Neurological: Negative.    Hematological: Negative.    Psychiatric/Behavioral: Negative.        Objective   Physical Exam  Vitals and nursing note reviewed.   Constitutional:       Appearance: Normal appearance. She is well-developed.   HENT:      Head: Normocephalic and atraumatic.      Right Ear: Tympanic membrane and external ear normal.      Left Ear: Tympanic membrane and external ear normal.      Nose: Nose normal.      Mouth/Throat:      Mouth: Mucous membranes are moist.   Eyes:      Extraocular Movements: Extraocular movements intact.      Pupils: Pupils are equal, round, and reactive to light.   Cardiovascular:      Rate and Rhythm: Normal rate and regular rhythm.      Pulses: Normal pulses.      Heart sounds: Normal heart sounds.      Comments: 1+ edema left lower extremity    Pulmonary:      Effort: Pulmonary effort is normal. No respiratory distress.      Breath sounds: Normal breath sounds.   Abdominal:      General: Abdomen is flat. Bowel sounds are normal.      Palpations: Abdomen is soft.   Musculoskeletal:         General: Normal range of motion.      Cervical back: Normal range of motion and neck supple.   Skin:     General: Skin is warm and dry.   Neurological:      General: No focal deficit present.      Mental Status: She is alert and oriented to person, place, and time.   Psychiatric:         Mood and Affect: Mood normal.         Behavior: Behavior normal.         Thought Content: Thought content normal.         Judgment: Judgment normal.          /56   Pulse 68   Ht 154.9 cm (61\")   Wt 62.1 kg (137 lb)   BMI 25.89 kg/m²     Assessment & Plan   Diagnoses and all orders for this visit:    Essential hypertension    Hypothyroidism, unspecified type      Patient w/ hypothyroidism. She will reduce synthroid to 1/2 tablet one day of the week full tab other 6 days. She will continue current medications for bp regulation. Gave rx for compression " stockings- juzo brand or other. She will elevated her feet when seated. She will f/u in office in  Months or prn.

## 2022-09-08 RX ORDER — LEVOTHYROXINE SODIUM 75 MCG
TABLET ORAL
Qty: 90 TABLET | Refills: 2 | Status: SHIPPED | OUTPATIENT
Start: 2022-09-08 | End: 2023-02-24

## 2022-09-09 ENCOUNTER — OFFICE VISIT (OUTPATIENT)
Dept: INTERNAL MEDICINE | Facility: CLINIC | Age: 82
End: 2022-09-09

## 2022-09-09 VITALS
DIASTOLIC BLOOD PRESSURE: 72 MMHG | BODY MASS INDEX: 24.73 KG/M2 | HEIGHT: 61 IN | OXYGEN SATURATION: 97 % | TEMPERATURE: 98.6 F | SYSTOLIC BLOOD PRESSURE: 112 MMHG | WEIGHT: 131 LBS | HEART RATE: 104 BPM

## 2022-09-09 DIAGNOSIS — R35.0 URINE FREQUENCY: Primary | ICD-10-CM

## 2022-09-09 DIAGNOSIS — N30.01 ACUTE CYSTITIS WITH HEMATURIA: Primary | ICD-10-CM

## 2022-09-09 LAB
BILIRUB BLD-MCNC: ABNORMAL MG/DL
CLARITY, POC: CLEAR
COLOR UR: YELLOW
EXPIRATION DATE: ABNORMAL
GLUCOSE UR STRIP-MCNC: NEGATIVE MG/DL
KETONES UR QL: ABNORMAL
LEUKOCYTE EST, POC: ABNORMAL
Lab: ABNORMAL
NITRITE UR-MCNC: NEGATIVE MG/ML
PH UR: 5.5 [PH] (ref 5–8)
PROT UR STRIP-MCNC: ABNORMAL MG/DL
RBC # UR STRIP: ABNORMAL /UL
SP GR UR: 1.03 (ref 1–1.03)
UROBILINOGEN UR QL: NORMAL

## 2022-09-09 PROCEDURE — 99213 OFFICE O/P EST LOW 20 MIN: CPT | Performed by: STUDENT IN AN ORGANIZED HEALTH CARE EDUCATION/TRAINING PROGRAM

## 2022-09-09 PROCEDURE — 81003 URINALYSIS AUTO W/O SCOPE: CPT | Performed by: STUDENT IN AN ORGANIZED HEALTH CARE EDUCATION/TRAINING PROGRAM

## 2022-09-09 RX ORDER — SULFAMETHOXAZOLE AND TRIMETHOPRIM 800; 160 MG/1; MG/1
1 TABLET ORAL 2 TIMES DAILY
Qty: 14 TABLET | Refills: 0 | Status: SHIPPED | OUTPATIENT
Start: 2022-09-09 | End: 2022-09-12 | Stop reason: SINTOL

## 2022-09-09 NOTE — PROGRESS NOTES
Vance Mitchell M.D.  Internal Medicine  [facility]  [address], [address]  [place] [address]  [phone]      Chief Complaint  Fatigue, Chills, Back Pain (Lower back pain /), and Fever (Highest fever 102.0 took Ibuprofen //)    SUBJECTIVE    History of Present Illness    Johana Corrigan is a 81 y.o. female with hyperlipidemia, hypothyroidism  who presents to the office today as an established patient of Dr Sola Gómez MD here today for an acute care visit.     She received flu shot 2 days ago and symptoms started after that. Symptoms are: she is Exhausted, aches.  She feels as if she has a UTI. She does have polyuria. Been in bed all day.  She is taking ibuprofen. She had a fever to 102 per her .  Per her  she has not been eating much.  No sick contacts. No SOA, no other symtoms. Took an at home tests that were negative    She reports having severe symptoms after her COVID shots as well.    She reports her back pain is from her new mattress. No CVA tenderness    Review of Systems   Constitutional: Positive for chills and fever.   Respiratory: Negative for chest tightness and shortness of breath.    Cardiovascular: Negative for chest pain and leg swelling.   Gastrointestinal: Negative for abdominal pain, constipation and diarrhea.   Genitourinary: Positive for dysuria and frequency. Negative for flank pain.   Musculoskeletal: Positive for back pain.       No Known Allergies     Outpatient Medications Marked as Taking for the 9/9/22 encounter (Office Visit) with Vance Mitchell MD   Medication Sig Dispense Refill   • azelastine (ASTELIN) 0.1 % nasal spray 2 sprays into the nostril(s) as directed by provider 2 (Two) Times a Day. Use in each nostril as directed 1 each 12   • Calcium-Phosphorus-Vitamin D (CALCIUM/D3 ADULT GUMMIES PO) Take 2 tablets by mouth Every Morning.     • hydroCHLOROthiazide (HYDRODIURIL) 12.5 MG tablet Take 1 tablet by mouth Daily. 90 tablet 3   • losartan  "(Cozaar) 25 MG tablet Take 1 tablet by mouth Daily. 90 tablet 3   • montelukast (Singulair) 10 MG tablet Take 1 tablet by mouth Every Night. 90 tablet 3   • rosuvastatin (CRESTOR) 5 MG tablet Take 1 tablet by mouth Daily. 90 tablet 3   • Synthroid 75 MCG tablet TAKE 1 TABLET EVERY DAY 90 tablet 2        Past Medical History:   Diagnosis Date   • Allergic    • Arthritis    • Cataract    • Dyspnea    • Headache    • History of anemia    • Hyperlipidemia    • Hypertension    • Hyperthyroidism    • Leukopenia    • Osteoarthritis      Past Surgical History:   Procedure Laterality Date   • BRONCHOSCOPY N/A 12/13/2018    Procedure: BRONCHOSCOPY WITH;  Surgeon: Gee Kenney MD;  Location: [facility] ENDOSCOPY;  Service: Pulmonary   • CATARACT EXTRACTION Bilateral    • COLONOSCOPY     • EXCISION MASS LEG Right 11/5/2021    Procedure: LOWER EXTREMITY EXCISION LESION/CYST;  Surgeon: Anatoly Jones MD;  Location: [facility] OR OSC;  Service: General;  Laterality: Right;   • FOOT SURGERY Left     bone spur   • KNEE ASPIRATION Right 09/22/2021    rt knee mass   • NOSE SURGERY     • SUBMANDIBULAR GLAND EXCISION     • TONSILLECTOMY     • TUBAL ABDOMINAL LIGATION       Family History   Problem Relation Age of Onset   • Heart disease Father    • Heart disease Brother    • Heart disease Mother    • Malig Hyperthermia Neg Hx    • Breast cancer Neg Hx    • Ovarian cancer Neg Hx     reports that she quit smoking about 32 years ago. Her smoking use included cigarettes. She has a 8.75 pack-year smoking history. She has never used smokeless tobacco. She reports current alcohol use of about 7.0 standard drinks of alcohol per week. She reports that she does not use drugs.    OBJECTIVE    Vital Signs:   /72   Pulse 104   Temp 98.6 °F (37 °C)   Ht 154.9 cm (61\")   Wt 59.4 kg (131 lb)   SpO2 97%   BMI 24.75 kg/m²     Physical Exam  Constitutional:       Appearance: Normal appearance. She is normal weight.   Cardiovascular:      Rate and " Rhythm: Normal rate and regular rhythm.      Heart sounds: Normal heart sounds. No murmur heard.  Pulmonary:      Effort: Pulmonary effort is normal.      Breath sounds: Normal breath sounds.   Abdominal:      General: Abdomen is flat. There is no distension.      Palpations: Abdomen is soft.      Tenderness: There is no abdominal tenderness. There is no right CVA tenderness or left CVA tenderness.   Skin:     General: Skin is warm and dry.   Neurological:      Mental Status: She is alert.   Psychiatric:         Mood and Affect: Mood normal.         Behavior: Behavior normal.         Thought Content: Thought content normal.            The following data was reviewed by: Vance Mitchell MD on 09/09/2022:  CMP    CMP 4/22/22 7/25/22 8/18/22   Glucose 123 (A) 84 87   BUN 14 11 13   Creatinine 0.80 0.75 0.77   Sodium 136 132 (A) 136   Potassium 2.9 (A) 4.3 4.5   Chloride 100 94 (A) 96   Calcium 8.9 9.7 9.9   Albumin 3.90     Total Bilirubin 0.3     Alkaline Phosphatase 52     AST (SGOT) 16     ALT (SGPT) 14     (A) Abnormal value            CBC w/diff    CBC w/Diff 11/3/21 3/18/22 4/22/22   WBC 4.62 5.8 9.15   RBC 4.39 4.41 4.41   Hemoglobin 13.5 13.6 13.7   Hematocrit 41.3 40.0 39.5   MCV 94.1 91 89.6   MCH 30.8 30.8 31.1   MCHC 32.7 34.0 34.7   RDW 12.5 12.2 12.3   Platelets 204 199 229   Neutrophil Rel %  61 80.0 (A)   Immature Granulocyte Rel %   0.3   Lymphocyte Rel %  29 13.6 (A)   Monocyte Rel %  8 4.8 (A)   Eosinophil Rel %  1 0.8   Basophil Rel %  1 0.5   (A) Abnormal value                TSH    TSH 3/18/22 7/25/22 8/18/22   TSH 1.390 0.895 1.340               Data reviewed: Previous PCP notes.         Component   Ref Range & Units 14:11   (9/9/22) 8 mo ago   (1/10/22) 1 yr ago   (1/12/21) 2 yr ago   (5/4/20) 2 yr ago   (2/21/20) 2 yr ago   (2/10/20) 2 yr ago   (1/8/20)   Color   Yellow, Straw, Dark Yellow, Sinai Yellow  Yellow  Yellow R, CM  Other Abnormal   Yellow R  Yellow  Yellow R    Clarity, UA   Clear Clear   Cloudy Abnormal   Clear  Cloudy Abnormal   Clear  Cloudy Abnormal   Clear    Specific Gravity    1.005 - 1.030 1.030  1.020  1.013  1.015  1.021  1.020  1.007    pH, Urine   5.0 - 8.0 5.5  7.0  6.5  6.5  6.0 R  7.5  7.5 R    Leukocytes   Negative Moderate (2+) Abnormal   Moderate (2+) Abnormal   Negative  Large (3+) Abnormal   Negative  Large (3+) Abnormal   Negative    Nitrite, UA   Negative Negative  Positive Abnormal   Negative  Negative  Negative  Positive Abnormal   Negative    Protein, POC   Negative mg/dL 300 mg/dL Abnormal   Trace Abnormal   Negative R  30 mg/dL Abnormal   Negative R  Trace Abnormal   Negative R    Glucose, UA   Negative mg/dL Negative  Negative R  Negative R  Negative R  Negative R  Negative R  Negative R    Ketones, UA   Negative >80 mg/dL Abnormal   Negative  Negative  Negative  Negative  Negative  Negative    Urobilinogen, UA   Normal, 0.2 E.U./dL Normal  Normal R   Normal R   Normal R     Bilirubin   Negative Moderate (2+) Abnormal   Negative  Negative  Negative  Negative  Negative  Negative    Blood, UA   Negative Moderate Abnormal   Trace Abnormal   Negative  Large Abnormal   Negative  Moderate Abnormal   Negative    Lot Number  [id number]  [id number]         Expiration Date  06/30/2023  8,312,022         Urobilinogen, UA   Comment CM   0.2 R   0.2 R    Microscopic Examination     Comment CM   Comment CM    Microscopic Examination     See below: CM   See below: CM    Urinalysis Reflex                ASSESSMENT & PLAN     Diagnoses and all orders for this visit:    1. Acute cystitis with hematuria (Primary)  -     Urinalysis With Culture If Indicated -; Future  -     POCT urinalysis dipstick, automated    Other orders  -     sulfamethoxazole-trimethoprim (Bactrim DS) 800-160 MG per tablet; Take 1 tablet by mouth 2 (Two) Times a Day.  Dispense: 14 tablet; Refill: 0    Patient with fevers and chills after getting her flu shot 2 days ago.  She also has some dysuria and urinalysis positive for  previous cultures showed pansensitive E. Coli.  I encouraged her to stay hydrated.    If she has worsening symptoms such as uncontrolled fever I encouraged her to call us or go to the emergency department.      Health Maintenance Due   Topic Date Due   • ANNUAL WELLNESS VISIT  01/18/2022   • LIPID PANEL  06/01/2022        Follow Up  No follow-ups on file.    Patient/family had no further questions at this time and verbalized understanding of the plan discussed today.

## 2022-09-12 ENCOUNTER — TELEPHONE (OUTPATIENT)
Dept: INTERNAL MEDICINE | Facility: CLINIC | Age: 82
End: 2022-09-12

## 2022-09-12 RX ORDER — CEPHALEXIN 500 MG/1
500 CAPSULE ORAL 2 TIMES DAILY
Qty: 14 CAPSULE | Refills: 0 | Status: SHIPPED | OUTPATIENT
Start: 2022-09-12 | End: 2023-02-28

## 2022-09-12 NOTE — TELEPHONE ENCOUNTER
Pt informed      ----- Message from Sola Gómez MD sent at 9/12/2022  1:15 PM EDT -----  Regarding: FW: yesterdays visit - not successful  Advise patient to stop bactrim. After symptoms from med improve then can start keflex one tablet twice a day. Take a probiotic with all antibiotics.   JW  ----- Message -----  From: Rosalinda Jimenez MA  Sent: 9/12/2022   8:49 AM EDT  To: Sola Gómez MD  Subject: FW: yesterdays visit - not successful              ----- Message -----  From: Johana Corrigan  Sent: 9/12/2022   8:05 AM EDT  To: [facility]  Subject: yesterdays visit - not successful                Sulfamethoxazole is not a good drug for me.    I feel worse than when I came in because my   insisted he had to come and it was not an accurate  portrayal of me.  Anyway this drug is not good with me I haven't seen sulfa drugs in years but took alot when young  I dont respond well..  my uti is fine  If you want to talk about  it call me after 11.  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

## 2022-09-15 LAB
APPEARANCE UR: ABNORMAL
BACTERIA #/AREA URNS HPF: ABNORMAL /[HPF]
BACTERIA UR CULT: ABNORMAL
BACTERIA UR CULT: ABNORMAL
BILIRUB UR QL STRIP: NEGATIVE
CASTS URNS QL MICRO: ABNORMAL /LPF
COLOR UR: YELLOW
EPI CELLS #/AREA URNS HPF: >10 /HPF (ref 0–10)
GLUCOSE UR QL STRIP: NEGATIVE
HGB UR QL STRIP: ABNORMAL
KETONES UR QL STRIP: ABNORMAL
LEUKOCYTE ESTERASE UR QL STRIP: ABNORMAL
MICRO URNS: ABNORMAL
NITRITE UR QL STRIP: NEGATIVE
OTHER ANTIBIOTIC SUSC ISLT: ABNORMAL
PH UR STRIP: 5.5 [PH] (ref 5–7.5)
PROT UR QL STRIP: ABNORMAL
RBC #/AREA URNS HPF: >30 /HPF (ref 0–2)
SP GR UR STRIP: 1.02 (ref 1–1.03)
URINALYSIS REFLEX: ABNORMAL
UROBILINOGEN UR STRIP-MCNC: 1 MG/DL (ref 0.2–1)
WBC #/AREA URNS HPF: >30 /HPF (ref 0–5)

## 2022-09-16 ENCOUNTER — TELEPHONE (OUTPATIENT)
Dept: INTERNAL MEDICINE | Facility: CLINIC | Age: 82
End: 2022-09-16

## 2022-09-16 NOTE — TELEPHONE ENCOUNTER
----- Message from Sola Gómez MD sent at 9/16/2022 12:48 PM EDT -----  Urine culture confirms infection by bacteria. It is sensitive to the medication given. How is she feeling?   JW

## 2022-09-30 ENCOUNTER — TELEPHONE (OUTPATIENT)
Dept: INTERNAL MEDICINE | Facility: CLINIC | Age: 82
End: 2022-09-30

## 2022-09-30 NOTE — TELEPHONE ENCOUNTER
PATIENTS  BRADEN HAS SOME CONCERNS REGARDING PATIENTS HEALTH. SUCH AS SHE SEEMS TO BE MUCH MUCH COLDER AT ALL TIME WITH TONS OF LAYERS AND BLANKETS, SHE TENDS TO BE VERY SLEEPY A LOT OF TIMES, AND SHE FREQUENTLY FORGETS SHORT TERM THINGS SOMETIMES ASK HIM TO REPEAT THINGS A FEW TIMES, NOT SEVERE OR ALL THE TIME.. HE DID SPEAK WITH HIS SISTER WHO IS A RETIRED PROFESSOR OF NEUROLOGY FROM [facility] [employer] ALSO A MD , DR. LEMUEL GOMEZ AND SHE BELIEVES PATIENT NEEDS A NEUROLOGY WORK UP.... IF DR. ESCUDERO CAN, WILL SHE CALL HER AND SPEAK WITH HER TO GET HE RECOMMENDATIONS SHE CAN BE REACHED AT: [phone] SHE ACTUALLY HAS A DR TO RECOMMEND     LESTER CAN BE REACHED AT> [phone] WIFE DOES NOT KNOW HE CALLED BECAUSE HE DOESN'T WANT TO GET HER WORKED UP.

## 2022-12-06 ENCOUNTER — TRANSCRIBE ORDERS (OUTPATIENT)
Dept: ADMINISTRATIVE | Facility: HOSPITAL | Age: 82
End: 2022-12-06

## 2022-12-06 DIAGNOSIS — Z92.89 HISTORY OF MAMMOGRAPHY, SCREENING: Primary | ICD-10-CM

## 2022-12-09 ENCOUNTER — TELEPHONE (OUTPATIENT)
Dept: INTERNAL MEDICINE | Facility: CLINIC | Age: 82
End: 2022-12-09

## 2022-12-09 NOTE — TELEPHONE ENCOUNTER
Caller: Johana Corrigan    Relationship: Self    Best call back number:  [phone] (Mobile)    What was the call regarding: PATIENT WANTED TO SEE WHEN SHE IS DUE TO COME IN AGAIN.     IS IT EVERY 4 MOS OR EVERY 6 MONS?     Do you require a callback: YES PLEASE

## 2023-01-24 ENCOUNTER — HOSPITAL ENCOUNTER (OUTPATIENT)
Dept: MAMMOGRAPHY | Facility: HOSPITAL | Age: 83
Discharge: HOME | End: 2023-01-24
Admitting: INTERNAL MEDICINE
Payer: MEDICARE

## 2023-01-24 DIAGNOSIS — E78.5 HYPERLIPIDEMIA, UNSPECIFIED HYPERLIPIDEMIA TYPE: Primary | ICD-10-CM

## 2023-01-24 DIAGNOSIS — Z92.89 HISTORY OF MAMMOGRAPHY, SCREENING: ICD-10-CM

## 2023-01-24 DIAGNOSIS — I10 ESSENTIAL HYPERTENSION: ICD-10-CM

## 2023-01-24 DIAGNOSIS — E03.9 HYPOTHYROIDISM, UNSPECIFIED TYPE: ICD-10-CM

## 2023-01-24 PROCEDURE — 77067 SCR MAMMO BI INCL CAD: CPT

## 2023-01-24 PROCEDURE — 77063 BREAST TOMOSYNTHESIS BI: CPT

## 2023-02-21 ENCOUNTER — OFFICE VISIT (OUTPATIENT)
Dept: INTERNAL MEDICINE | Facility: CLINIC | Age: 83
End: 2023-02-21
Payer: MEDICARE

## 2023-02-21 VITALS
RESPIRATION RATE: 14 BRPM | SYSTOLIC BLOOD PRESSURE: 122 MMHG | OXYGEN SATURATION: 96 % | DIASTOLIC BLOOD PRESSURE: 72 MMHG | HEART RATE: 75 BPM | BODY MASS INDEX: 25.13 KG/M2 | WEIGHT: 133 LBS

## 2023-02-21 DIAGNOSIS — I10 ESSENTIAL HYPERTENSION: ICD-10-CM

## 2023-02-21 DIAGNOSIS — E03.9 HYPOTHYROIDISM, UNSPECIFIED TYPE: Primary | ICD-10-CM

## 2023-02-21 PROCEDURE — 99214 OFFICE O/P EST MOD 30 MIN: CPT | Performed by: INTERNAL MEDICINE

## 2023-02-21 NOTE — PROGRESS NOTES
Chief Complaint   Patient presents with   • Hypertension   • Hypothyroidism   • nasal drainage       History of Present Illness   Johana Corrigan is a 82 y.o. female presents for follow up evaluation. She reports that she is doing well today. She was having some difficulty w/ sleep. Now taking cbd w/ good benefit. She had some seasonal allergies. She is taking montleukast and azelastine for this. She has frequent nasal drainage. She notes that she is reducing obligations which is beneficial for patient. Labs ordered but not yet obtained.       The following portions of the patient's history were reviewed and updated as appropriate: allergies, current medications, past family history, past medical history, past social history, past surgical history and problem list.  Current Outpatient Medications on File Prior to Visit   Medication Sig Dispense Refill   • azelastine (ASTELIN) 0.1 % nasal spray 2 sprays into the nostril(s) as directed by provider 2 (Two) Times a Day. Use in each nostril as directed 1 each 12   • Calcium-Phosphorus-Vitamin D (CALCIUM/D3 ADULT GUMMIES PO) Take 2 tablets by mouth Every Morning.     • hydroCHLOROthiazide (HYDRODIURIL) 12.5 MG tablet Take 1 tablet by mouth Daily. 90 tablet 3   • losartan (Cozaar) 25 MG tablet Take 1 tablet by mouth Daily. 90 tablet 3   • rosuvastatin (CRESTOR) 5 MG tablet Take 1 tablet by mouth Daily. 90 tablet 3   • Synthroid 75 MCG tablet TAKE 1 TABLET EVERY DAY 90 tablet 2   • cephalexin (Keflex) 500 MG capsule Take 1 capsule by mouth 2 (Two) Times a Day. 14 capsule 0   • montelukast (Singulair) 10 MG tablet Take 1 tablet by mouth Every Night. 90 tablet 3     No current facility-administered medications on file prior to visit.     Review of Systems   Constitutional: Negative.    HENT: Negative.    Eyes: Negative.    Respiratory: Negative.    Cardiovascular: Positive for leg swelling.        Mild edema after eating out a few times a day   Gastrointestinal: Negative.     Endocrine: Negative.    Genitourinary: Negative.    Musculoskeletal: Negative.    Allergic/Immunologic: Negative.    Psychiatric/Behavioral: Negative.        Objective   Physical Exam  Vitals and nursing note reviewed.   Constitutional:       Appearance: Normal appearance. She is well-developed.   HENT:      Head: Normocephalic and atraumatic.      Right Ear: Tympanic membrane and external ear normal.      Left Ear: Tympanic membrane and external ear normal.      Nose: Nose normal.      Mouth/Throat:      Mouth: Mucous membranes are moist.   Eyes:      Extraocular Movements: Extraocular movements intact.      Pupils: Pupils are equal, round, and reactive to light.   Cardiovascular:      Rate and Rhythm: Normal rate and regular rhythm.      Pulses: Normal pulses.      Heart sounds: Normal heart sounds.   Pulmonary:      Effort: Pulmonary effort is normal. No respiratory distress.      Breath sounds: Normal breath sounds.   Abdominal:      General: Abdomen is flat.      Palpations: Abdomen is soft.   Musculoskeletal:         General: Normal range of motion.      Cervical back: Normal range of motion and neck supple.   Skin:     General: Skin is warm and dry.   Neurological:      General: No focal deficit present.      Mental Status: She is alert and oriented to person, place, and time.   Psychiatric:         Mood and Affect: Mood normal.         Behavior: Behavior normal.         Thought Content: Thought content normal.         Judgment: Judgment normal.          /72 (BP Location: Left arm, Patient Position: Sitting, Cuff Size: Adult)   Pulse 75   Resp 14   Wt 60.3 kg (133 lb)   SpO2 96%   BMI 25.13 kg/m²     Assessment & Plan   Diagnoses and all orders for this visit:    Hypothyroidism, unspecified type    Essential hypertension        Patient w/ hypothryoidism. Will test thyroid function testing. She will continue current meds for bp. She has mild peripheral edema. She takes diuretic prn and will have  on today. Family w/ concerns regarding patient's memory. mmse today. Will continue to monitor. 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 She will f/u here in 6 months or prn.

## 2023-02-22 LAB
ALBUMIN SERPL-MCNC: 4.4 G/DL (ref 3.5–5.2)
ALBUMIN/GLOB SERPL: 2.6 G/DL
ALP SERPL-CCNC: 47 U/L (ref 39–117)
ALT SERPL-CCNC: 14 U/L (ref 1–33)
APPEARANCE UR: CLEAR
AST SERPL-CCNC: 18 U/L (ref 1–32)
BACTERIA #/AREA URNS HPF: NORMAL /HPF
BASOPHILS # BLD AUTO: 0.03 10*3/MM3 (ref 0–0.2)
BASOPHILS NFR BLD AUTO: 0.7 % (ref 0–1.5)
BILIRUB SERPL-MCNC: 0.4 MG/DL (ref 0–1.2)
BILIRUB UR QL STRIP: NEGATIVE
BUN SERPL-MCNC: 19 MG/DL (ref 8–23)
BUN/CREAT SERPL: 23.5 (ref 7–25)
CALCIUM SERPL-MCNC: 9.7 MG/DL (ref 8.6–10.5)
CASTS URNS QL MICRO: NORMAL /LPF
CHLORIDE SERPL-SCNC: 96 MMOL/L (ref 98–107)
CHOLEST SERPL-MCNC: 168 MG/DL (ref 0–200)
CO2 SERPL-SCNC: 28.8 MMOL/L (ref 22–29)
COLOR UR: YELLOW
CREAT SERPL-MCNC: 0.81 MG/DL (ref 0.57–1)
EGFRCR SERPLBLD CKD-EPI 2021: 72.6 ML/MIN/1.73
EOSINOPHIL # BLD AUTO: 0.08 10*3/MM3 (ref 0–0.4)
EOSINOPHIL NFR BLD AUTO: 1.7 % (ref 0.3–6.2)
EPI CELLS #/AREA URNS HPF: NORMAL /HPF (ref 0–10)
ERYTHROCYTE [DISTWIDTH] IN BLOOD BY AUTOMATED COUNT: 11.6 % (ref 12.3–15.4)
GLOBULIN SER CALC-MCNC: 1.7 GM/DL
GLUCOSE SERPL-MCNC: 89 MG/DL (ref 65–99)
GLUCOSE UR QL STRIP: NEGATIVE
HCT VFR BLD AUTO: 41.2 % (ref 34–46.6)
HDLC SERPL-MCNC: 86 MG/DL (ref 40–60)
HGB BLD-MCNC: 14.2 G/DL (ref 12–15.9)
HGB UR QL STRIP: NEGATIVE
IMM GRANULOCYTES # BLD AUTO: 0.02 10*3/MM3 (ref 0–0.05)
IMM GRANULOCYTES NFR BLD AUTO: 0.4 % (ref 0–0.5)
KETONES UR QL STRIP: NEGATIVE
LDLC SERPL CALC-MCNC: 73 MG/DL (ref 0–100)
LDLC/HDLC SERPL: 0.87 {RATIO}
LEUKOCYTE ESTERASE UR QL STRIP: NEGATIVE
LYMPHOCYTES # BLD AUTO: 1.13 10*3/MM3 (ref 0.7–3.1)
LYMPHOCYTES NFR BLD AUTO: 24.6 % (ref 19.6–45.3)
MCH RBC QN AUTO: 31.5 PG (ref 26.6–33)
MCHC RBC AUTO-ENTMCNC: 34.5 G/DL (ref 31.5–35.7)
MCV RBC AUTO: 91.4 FL (ref 79–97)
MICRO URNS: NORMAL
MICRO URNS: NORMAL
MONOCYTES # BLD AUTO: 0.36 10*3/MM3 (ref 0.1–0.9)
MONOCYTES NFR BLD AUTO: 7.8 % (ref 5–12)
NEUTROPHILS # BLD AUTO: 2.98 10*3/MM3 (ref 1.7–7)
NEUTROPHILS NFR BLD AUTO: 64.8 % (ref 42.7–76)
NITRITE UR QL STRIP: NEGATIVE
NRBC BLD AUTO-RTO: 0 /100 WBC (ref 0–0.2)
PH UR STRIP: 6.5 [PH] (ref 5–7.5)
PLATELET # BLD AUTO: 215 10*3/MM3 (ref 140–450)
POTASSIUM SERPL-SCNC: 3.7 MMOL/L (ref 3.5–5.2)
PROT SERPL-MCNC: 6.1 G/DL (ref 6–8.5)
PROT UR QL STRIP: NEGATIVE
RBC # BLD AUTO: 4.51 10*6/MM3 (ref 3.77–5.28)
RBC #/AREA URNS HPF: NORMAL /HPF (ref 0–2)
SODIUM SERPL-SCNC: 137 MMOL/L (ref 136–145)
SP GR UR STRIP: 1.02 (ref 1–1.03)
T4 FREE SERPL-MCNC: 2.1 NG/DL (ref 0.93–1.7)
TRIGL SERPL-MCNC: 38 MG/DL (ref 0–150)
TSH SERPL DL<=0.005 MIU/L-ACNC: 0.62 UIU/ML (ref 0.27–4.2)
URINALYSIS REFLEX: NORMAL
UROBILINOGEN UR STRIP-MCNC: 0.2 MG/DL (ref 0.2–1)
VLDLC SERPL CALC-MCNC: 9 MG/DL (ref 5–40)
WBC # BLD AUTO: 4.6 10*3/MM3 (ref 3.4–10.8)
WBC #/AREA URNS HPF: NORMAL /HPF (ref 0–5)

## 2023-02-24 ENCOUNTER — TELEPHONE (OUTPATIENT)
Dept: INTERNAL MEDICINE | Facility: CLINIC | Age: 83
End: 2023-02-24
Payer: MEDICARE

## 2023-02-24 DIAGNOSIS — E05.90 HYPERTHYROIDISM: Primary | ICD-10-CM

## 2023-02-24 RX ORDER — LEVOTHYROXINE SODIUM 50 MCG
50 TABLET ORAL DAILY
Qty: 90 TABLET | Refills: 1 | Status: SHIPPED | OUTPATIENT
Start: 2023-02-24

## 2023-02-24 NOTE — TELEPHONE ENCOUNTER
Pt informed      ----- Message from Sola Gómez MD sent at 2/24/2023  4:25 PM EST -----  Thyroid is oversupplemented at this time. Advise reducing to 50 mcg. This was sent to [facility]. Should have a repeat tsh and free t4 in 6-8 weeks.   [email or web address]

## 2023-02-28 ENCOUNTER — TELEPHONE (OUTPATIENT)
Dept: INTERNAL MEDICINE | Facility: CLINIC | Age: 83
End: 2023-02-28
Payer: MEDICARE

## 2023-02-28 RX ORDER — AMOXICILLIN 500 MG/1
500 CAPSULE ORAL 2 TIMES DAILY
Qty: 10 CAPSULE | Refills: 0 | Status: SHIPPED | OUTPATIENT
Start: 2023-02-28 | End: 2023-03-20

## 2023-02-28 NOTE — TELEPHONE ENCOUNTER
Pt informed      ----- Message from Sola Gómez MD sent at 2/28/2023 12:29 PM EST -----  Regarding: FW: UTI  Contact: [phone]  Rx amoxil sent to pharmacy. One tablet 2 times daily for 5 days. May follow up if no improvement in 72 hours  jw  ----- Message -----  From: Rosalinda Jimenez MA  Sent: 2/28/2023  10:45 AM EST  To: Sola Gómez MD  Subject: FW: UTI                                            ----- Message -----  From: Johana Corrigan  Sent: 2/28/2023  10:30 AM EST  To: [facility]  Subject: UTI                                              HAVE DEVELOPED A UTI (NO BLOOD, JUST FREQUENT AND CLEAR.  PLEASE ORDER A PRESCRIPTION.  ANY QUESTIONS CALL.  AM SHIRA JUST HAD MY ANNUAL [employer] HOME VISIT.

## 2023-03-20 ENCOUNTER — OFFICE VISIT (OUTPATIENT)
Dept: INTERNAL MEDICINE | Facility: CLINIC | Age: 83
End: 2023-03-20
Payer: MEDICARE

## 2023-03-20 VITALS
WEIGHT: 135 LBS | DIASTOLIC BLOOD PRESSURE: 70 MMHG | SYSTOLIC BLOOD PRESSURE: 150 MMHG | OXYGEN SATURATION: 98 % | HEIGHT: 61 IN | BODY MASS INDEX: 25.49 KG/M2 | HEART RATE: 63 BPM

## 2023-03-20 DIAGNOSIS — R23.9 SKIN CHANGE: Primary | ICD-10-CM

## 2023-03-20 PROCEDURE — 1159F MED LIST DOCD IN RCRD: CPT | Performed by: INTERNAL MEDICINE

## 2023-03-20 PROCEDURE — 3077F SYST BP >= 140 MM HG: CPT | Performed by: INTERNAL MEDICINE

## 2023-03-20 PROCEDURE — 1160F RVW MEDS BY RX/DR IN RCRD: CPT | Performed by: INTERNAL MEDICINE

## 2023-03-20 PROCEDURE — 3078F DIAST BP <80 MM HG: CPT | Performed by: INTERNAL MEDICINE

## 2023-03-20 PROCEDURE — 99213 OFFICE O/P EST LOW 20 MIN: CPT | Performed by: INTERNAL MEDICINE

## 2023-03-20 NOTE — PROGRESS NOTES
"Chief Complaint   Patient presents with   • Mass     L upper arm         History of Present Illness   Johana Corrigan is a 82 y.o. female presents for acute care. She is concerned about a skin change on her left arm. She notes it has been there \"a while\". Had some swelling and she punctured with a needle w/ bloody return.   She notes left hip arthritis. She is to see her orthopedist for this. During day it is not troublesome but at night it can keep her awake.       The following portions of the patient's history were reviewed and updated as appropriate: allergies, current medications, past family history, past medical history, past social history, past surgical history and problem list.  Current Outpatient Medications on File Prior to Visit   Medication Sig Dispense Refill   • Calcium-Phosphorus-Vitamin D (CALCIUM/D3 ADULT GUMMIES PO) Take 2 tablets by mouth Every Morning.     • hydroCHLOROthiazide (HYDRODIURIL) 12.5 MG tablet Take 1 tablet by mouth Daily. 90 tablet 3   • losartan (Cozaar) 25 MG tablet Take 1 tablet by mouth Daily. 90 tablet 3   • montelukast (Singulair) 10 MG tablet Take 1 tablet by mouth Every Night. 90 tablet 3   • rosuvastatin (CRESTOR) 5 MG tablet Take 1 tablet by mouth Daily. 90 tablet 3   • Synthroid 50 MCG tablet Take 1 tablet by mouth Daily. 90 tablet 1   • azelastine (ASTELIN) 0.1 % nasal spray 2 sprays into the nostril(s) as directed by provider 2 (Two) Times a Day. Use in each nostril as directed 1 each 12   • [DISCONTINUED] amoxicillin (AMOXIL) 500 MG capsule Take 1 capsule by mouth 2 (Two) Times a Day. 10 capsule 0     No current facility-administered medications on file prior to visit.     Review of Systems   Constitutional: Negative.    HENT: Negative.    Eyes: Negative.    Respiratory: Negative.    Cardiovascular: Negative.    Gastrointestinal: Negative.    Endocrine: Negative.    Genitourinary: Negative.    Musculoskeletal: Negative.    Skin: Positive for color change. " "  Allergic/Immunologic: Negative.    Neurological: Negative.    Hematological: Negative.    Psychiatric/Behavioral: Negative.        Objective   Physical Exam  Vitals and nursing note reviewed.   Constitutional:       Appearance: Normal appearance.   HENT:      Head: Normocephalic and atraumatic.      Right Ear: Tympanic membrane normal.      Left Ear: Tympanic membrane normal.      Nose: Nose normal.      Mouth/Throat:      Mouth: Mucous membranes are moist.   Eyes:      Extraocular Movements: Extraocular movements intact.      Pupils: Pupils are equal, round, and reactive to light.   Cardiovascular:      Rate and Rhythm: Normal rate and regular rhythm.      Pulses: Normal pulses.      Heart sounds: Normal heart sounds.   Pulmonary:      Effort: Pulmonary effort is normal.      Breath sounds: Normal breath sounds.   Abdominal:      General: Abdomen is flat.      Palpations: Abdomen is soft.   Musculoskeletal:         General: Normal range of motion.      Cervical back: Normal range of motion.   Skin:     Comments: Red raised skin lesion left upper extremity     Neurological:      General: No focal deficit present.      Mental Status: She is alert and oriented to person, place, and time.   Psychiatric:         Mood and Affect: Mood normal.         Behavior: Behavior normal.         Thought Content: Thought content normal.         Judgment: Judgment normal.          /70   Pulse 63   Ht 154.9 cm (61\")   Wt 61.2 kg (135 lb)   SpO2 98%   BMI 25.51 kg/m²     Assessment & Plan   Diagnoses and all orders for this visit:    Skin change  -     Ambulatory Referral to Dermatology      Acute skin change left upper extremity. Area is suspicious for possible bcc v scc. Will refer to dermatology ASAP. She will f/u here if other changes or other needs.          "

## 2023-04-02 ENCOUNTER — HOSPITAL ENCOUNTER (EMERGENCY)
Facility: HOSPITAL | Age: 83
Discharge: HOME | End: 2023-04-02
Attending: EMERGENCY MEDICINE | Admitting: EMERGENCY MEDICINE
Payer: MEDICARE

## 2023-04-02 ENCOUNTER — APPOINTMENT (OUTPATIENT)
Dept: GENERAL RADIOLOGY | Facility: HOSPITAL | Age: 83
End: 2023-04-02
Payer: MEDICARE

## 2023-04-02 VITALS
OXYGEN SATURATION: 96 % | HEIGHT: 61 IN | BODY MASS INDEX: 25.49 KG/M2 | HEART RATE: 74 BPM | RESPIRATION RATE: 16 BRPM | TEMPERATURE: 97.8 F | DIASTOLIC BLOOD PRESSURE: 67 MMHG | WEIGHT: 135 LBS | SYSTOLIC BLOOD PRESSURE: 126 MMHG

## 2023-04-02 DIAGNOSIS — S81.811A SKIN TEAR OF LOWER LEG WITHOUT COMPLICATION, RIGHT, INITIAL ENCOUNTER: Primary | ICD-10-CM

## 2023-04-02 DIAGNOSIS — S80.11XA CONTUSION OF RIGHT LOWER LEG, INITIAL ENCOUNTER: ICD-10-CM

## 2023-04-02 PROCEDURE — 99283 EMERGENCY DEPT VISIT LOW MDM: CPT

## 2023-04-02 PROCEDURE — 73590 X-RAY EXAM OF LOWER LEG: CPT

## 2023-04-02 RX ORDER — GINSENG 100 MG
1 CAPSULE ORAL ONCE
Status: COMPLETED | OUTPATIENT
Start: 2023-04-02 | End: 2023-04-02

## 2023-04-02 RX ORDER — GINSENG 100 MG
1 CAPSULE ORAL DAILY
Qty: 14 G | Refills: 0 | Status: SHIPPED | OUTPATIENT
Start: 2023-04-02

## 2023-04-02 RX ADMIN — BACITRACIN 0.9 G: 500 OINTMENT TOPICAL at 18:26

## 2023-04-02 NOTE — ED PROVIDER NOTES
EMERGENCY DEPARTMENT ENCOUNTER    Room Number:  19/19  Date seen:  4/2/2023  PCP: Sola Gómez MD  Historian: Patient,       HPI:  Chief Complaint: Right lower leg injury  A complete HPI/ROS/PMH/PSH/SH/FH are unobtainable due to: Nothing  Context: Johana Corrigan is a 82 y.o. female who presents to the ED c/o right lower leg injury.  Patient tripped and struck her right shin on a stool earlier this morning.  She reports a skin tear on her right lower leg.  She did not hit her head or lose consciousness.  Denies neck pain, back pain, chest pain, or numbness/tingling/weakness in her extremities.  Patient cleaned and dressed her wound at home.  She has been able to ambulate.  She is not on anticoagulants            PAST MEDICAL HISTORY  Active Ambulatory Problems     Diagnosis Date Noted   • Atopic rhinitis 06/29/2016   • Osteoarthritis 06/29/2016   • Hyperlipidemia 06/29/2016   • Hypothyroidism 06/29/2016   • Leukopenia 06/29/2016   • Depressed mood 04/07/2017   • Essential hypertension 10/07/2019   • Mass of right lower extremity 10/18/2021   • Venous insufficiency 05/05/2022     Resolved Ambulatory Problems     Diagnosis Date Noted   • Fatigue 06/29/2016   • Subclinical hypothyroidism 06/29/2016   • Wound abscess 10/18/2016     Past Medical History:   Diagnosis Date   • Allergic    • Arthritis    • Cataract    • Dyspnea    • Headache    • History of anemia    • Hypertension    • Hyperthyroidism          PAST SURGICAL HISTORY  Past Surgical History:   Procedure Laterality Date   • BRONCHOSCOPY N/A 12/13/2018    Procedure: BRONCHOSCOPY WITH;  Surgeon: Gee Kenney MD;  Location: [facility] ENDOSCOPY;  Service: Pulmonary   • CATARACT EXTRACTION Bilateral    • COLONOSCOPY     • EXCISION MASS LEG Right 11/5/2021    Procedure: LOWER EXTREMITY EXCISION LESION/CYST;  Surgeon: Anatoly Jones MD;  Location: [facility] OR [facility];  Service: General;  Laterality: Right;   • FOOT SURGERY Left     bone spur   • KNEE ASPIRATION  Right 2021    rt knee mass   • NOSE SURGERY     • SUBMANDIBULAR GLAND EXCISION     • TONSILLECTOMY     • TUBAL ABDOMINAL LIGATION           FAMILY HISTORY  Family History   Problem Relation Age of Onset   • Heart disease Father    • Heart disease Brother    • Heart disease Mother    • Malig Hyperthermia Neg Hx    • Breast cancer Neg Hx    • Ovarian cancer Neg Hx          SOCIAL HISTORY  Social History     Socioeconomic History   • Marital status:    Tobacco Use   • Smoking status: Former     Packs/day: 0.25     Years: 35.00     Pack years: 8.75     Types: Cigarettes     Quit date:      Years since quittin.2   • Smokeless tobacco: Never   Vaping Use   • Vaping Use: Never used   Substance and Sexual Activity   • Alcohol use: Yes     Alcohol/week: 7.0 standard drinks     Types: 7 Glasses of wine per week     Comment: caffeine use   • Drug use: Never   • Sexual activity: Defer         ALLERGIES  Patient has no known allergies.        REVIEW OF SYSTEMS  Review of Systems   All systems have been reviewed and are negative except as as discussed in the HPI    PHYSICAL EXAM  ED Triage Vitals   Temp Heart Rate Resp BP SpO2   23 1638 23 1638 23 1638 23 1645 23 1638   97.8 °F (36.6 °C) 94 16 166/66 96 %      Temp src Heart Rate Source Patient Position BP Location FiO2 (%)   23 1638 23 1638 23 1645 23 1645 --   Tympanic Monitor Lying Right arm        Physical Exam      GENERAL: Awake, alert, oriented x3.  Well-developed, well-nourished elderly female.  Resting comfortably in no acute distress  HENT: NCAT, nares patent  EYES: no scleral icterus  CV: regular rhythm, normal rate, normal pedal pulses bilaterally  RESPIRATORY: normal effort, clear to auscultation bilaterally  ABDOMEN: soft  MUSCULOSKELETAL: There is a superficial skin tear on the right shin.  Overlying skin has been completely avulsed.  There is minimal surrounding tenderness.  NEURO: Normal  strength and light touch sensation in all extremities  PSYCH:  calm, cooperative  SKIN: warm, dry    Vital signs and nursing notes reviewed.          LAB RESULTS  No results found for this or any previous visit (from the past 24 hour(s)).    Ordered the above labs and reviewed the results.        RADIOLOGY  XR Tibia Fibula 2 View Right    Result Date: 4/2/2023  TWO RADIOGRAPHIC VIEWS OF THE RIGHT TIBIA AND FIBULA  CLINICAL HISTORY: Right lower leg injury.  FINDINGS:  2 radiographic views of the right tibia and fibula demonstrate no evidence for acute fracture or bony malalignment.  This report was finalized on 4/2/2023 5:46 PM by Dr. Jcaques Hoff M.D.        Ordered the above noted radiological studies. Reviewed by me in PACS.            PROCEDURES  Procedures              MEDICATIONS GIVEN IN ER  Medications   bacitracin 500 UNIT/GM ointment 0.9 g (0.9 g Topical Given 4/2/23 1826)                   MEDICAL DECISION MAKING, PROGRESS, and CONSULTS    All labs have been independently reviewed by me.  All radiology studies have been reviewed by me and I have also reviewed the radiology report.   EKG's independently viewed and interpreted by me.  Discussion below represents my analysis of pertinent findings related to patient's condition, differential diagnosis, treatment plan and final disposition.      Additional sources:  - Discussed/ obtained information from independent historians:  at bedside    - External (non-ED) record review: Patient saw her PCP on 3/20/2023 for skin changes on her left upper arm.  She was referred to dermatology.    - Chronic or social conditions impacting care: N/A          Orders placed during this visit:  Orders Placed This Encounter   Procedures   • XR Tibia Fibula 2 View Right   • Wound dressing         Additional orders considered but not ordered:  N/A        Differential diagnosis:    Skin tear, lower leg fracture, lower leg contusion      Independent interpretation of labs,  radiology studies, and discussions with consultants:  ED Course as of 04/02/23 1853   Sun Apr 02, 2023   2924 Per the EMR, patient had a Tdap in October 2019 [WH]   1747 Right tib-fib x-rays personally interpreted by me.  My personal interpretation is: No fracture.  No bony abnormality.  No foreign body [WH]   1810 X-ray results discussed with the patient.  We will place bacitracin on her wound and then redress it. [WH]      ED Course User Index  [WH] Andi Cruz MD               DIAGNOSIS  Final diagnoses:   Skin tear of lower leg without complication, right, initial encounter   Contusion of right lower leg, initial encounter         DISPOSITION  DISCHARGE    Patient discharged in stable condition.    Reviewed implications of results, diagnosis, meds, responsibility to follow up, warning signs and symptoms of possible worsening, potential complications and reasons to return to ER, including redness around wound, drainage from wound, fever, chills, or other concern.    Patient/Family voiced understanding of above instructions.    Discussed plan for discharge, as there is no emergent indication for admission. Patient referred to primary care provider for BP management due to today's BP. Pt/family is agreeable and understands need for follow up and repeat testing.  Pt is aware that discharge does not mean that nothing is wrong but it indicates no emergency is present that requires admission and they must continue care with follow-up as given below or physician of their choice.     FOLLOW-UP  Sola Gómez MD  [address] [address] [phone]    Schedule an appointment as soon as possible for a visit   As needed         Medication List      New Prescriptions    bacitracin 500 UNIT/GM ointment  Apply 1 application topically to the appropriate area as directed Daily.           Where to Get Your Medications      These medications were sent to [id number]  [place] - [address] AT [address] [facility] [address] - [phone]  - [phone]  [address], [facility] [address]    Phone: [phone]   · bacitracin 500 UNIT/GM ointment                   Latest Documented Vital Signs:  As of 18:53 EDT  BP- 163/52 HR- 90 Temp- 97.8 °F (36.6 °C) (Tympanic) O2 sat- 97%              --    Please note that portions of this were completed with a voice recognition program.       Note Disclaimer: At [facility], we believe that sharing information builds trust and better relationships. You are receiving this note because you are receiving care at [facility] or recently visited. It is possible you will see health information before a provider has talked with you about it. This kind of information can be easy to misunderstand. To help you fully understand what it means for your health, we urge you to discuss this note with your provider.           Andi Cruz MD  04/02/23 3212

## 2023-04-02 NOTE — DISCHARGE INSTRUCTIONS
Clean wound with warm soapy water once a day.  Then apply bacitracin and cover with a nonadherent dressing.  Return to the emergency department for redness around wound, drainage from wound, fever, chills, or other concern.

## 2023-04-02 NOTE — ED TRIAGE NOTES
Pt tripped and fell today.  Denies dizziness.  Did not hit head.  Is not on blood thinners.  She has a skin tear on right leg    Patient was placed in face mask during first look triage.  Patient was wearing a face mask throughout encounter.  I wore personal protective equipment throughout the encounter.  Hand hygiene was performed before and after patient encounter.

## 2023-04-03 RX ORDER — HYDROCHLOROTHIAZIDE 12.5 MG/1
TABLET ORAL
Qty: 90 TABLET | Refills: 3 | Status: SHIPPED | OUTPATIENT
Start: 2023-04-03

## 2023-04-18 ENCOUNTER — TRANSCRIBE ORDERS (OUTPATIENT)
Dept: PHYSICAL THERAPY | Facility: HOSPITAL | Age: 83
End: 2023-04-18
Payer: MEDICARE

## 2023-04-18 ENCOUNTER — TELEPHONE (OUTPATIENT)
Dept: INTERNAL MEDICINE | Facility: CLINIC | Age: 83
End: 2023-04-18
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
## 2023-04-18 NOTE — TELEPHONE ENCOUNTER
Caller: Johana Corrigan     Relationship: [unfilled]     Best call back number: [phone]    What is your medical concern?  PATIENT STATES SHE TRIED TO SEND A MESSAGE ON [employer] ABOUT PHYSICAL THERAPY TODAY  (UNSURE IF IT GOT THROUGH). WANTED TO LET DR ESCUDERO KNOW TO IGNORE THE MESSAGE IF SHE RECEIVED IT AS THE ISSUE HAS BEEN RESOLVED

## 2023-04-21 ENCOUNTER — OFFICE VISIT (OUTPATIENT)
Dept: INTERNAL MEDICINE | Facility: CLINIC | Age: 83
End: 2023-04-21
Payer: MEDICARE

## 2023-04-21 VITALS
DIASTOLIC BLOOD PRESSURE: 68 MMHG | WEIGHT: 130.4 LBS | HEIGHT: 61 IN | BODY MASS INDEX: 24.62 KG/M2 | SYSTOLIC BLOOD PRESSURE: 122 MMHG | HEART RATE: 64 BPM | OXYGEN SATURATION: 95 %

## 2023-04-21 DIAGNOSIS — L03.90 WOUND CELLULITIS: ICD-10-CM

## 2023-04-21 DIAGNOSIS — S81.802A WOUND OF LEFT LOWER EXTREMITY, INITIAL ENCOUNTER: Primary | ICD-10-CM

## 2023-04-21 RX ORDER — CEPHALEXIN 500 MG/1
500 CAPSULE ORAL 2 TIMES DAILY
Qty: 14 CAPSULE | Refills: 0 | Status: SHIPPED | OUTPATIENT
Start: 2023-04-21

## 2023-04-21 RX ORDER — SACCHAROMYCES BOULARDII 250 MG
250 CAPSULE ORAL 2 TIMES DAILY
Qty: 20 CAPSULE | Refills: 0 | Status: SHIPPED | OUTPATIENT
Start: 2023-04-21

## 2023-04-21 RX ORDER — CEFTRIAXONE 1 G/1
1 INJECTION, POWDER, FOR SOLUTION INTRAMUSCULAR; INTRAVENOUS ONCE
Status: COMPLETED | OUTPATIENT
Start: 2023-04-21 | End: 2023-04-21

## 2023-04-21 RX ADMIN — CEFTRIAXONE 1 G: 1 INJECTION, POWDER, FOR SOLUTION INTRAMUSCULAR; INTRAVENOUS at 15:22

## 2023-04-21 NOTE — PROGRESS NOTES
Chief Complaint   Patient presents with   • Wound Check     Redness and painful of lower leg        History of Present Illness   Johana Corrigan is a 82 y.o. female presents for acute care. Patient has skin tear right lower extremity. She went to ER. She has been bandaging this. She previously was with wound care and has initiated similar care for this wound. She struck a stool in her home and abraded the skin.       The following portions of the patient's history were reviewed and updated as appropriate: allergies, current medications, past family history, past medical history, past social history, past surgical history and problem list.  Current Outpatient Medications on File Prior to Visit   Medication Sig Dispense Refill   • azelastine (ASTELIN) 0.1 % nasal spray 2 sprays into the nostril(s) as directed by provider 2 (Two) Times a Day. Use in each nostril as directed 1 each 12   • bacitracin 500 UNIT/GM ointment Apply 1 application topically to the appropriate area as directed Daily. 14 g 0   • Calcium-Phosphorus-Vitamin D (CALCIUM/D3 ADULT GUMMIES PO) Take 2 tablets by mouth Every Morning.     • hydroCHLOROthiazide (HYDRODIURIL) 12.5 MG tablet TAKE 1 TABLET EVERY DAY 90 tablet 3   • losartan (Cozaar) 25 MG tablet Take 1 tablet by mouth Daily. 90 tablet 3   • montelukast (Singulair) 10 MG tablet Take 1 tablet by mouth Every Night. 90 tablet 3   • rosuvastatin (CRESTOR) 5 MG tablet Take 1 tablet by mouth Daily. 90 tablet 3   • Synthroid 50 MCG tablet Take 1 tablet by mouth Daily. 90 tablet 1     No current facility-administered medications on file prior to visit.     Review of Systems   Constitutional: Negative.    HENT: Negative.    Eyes: Negative.    Respiratory: Negative.    Cardiovascular: Negative.    Gastrointestinal: Negative.    Endocrine: Negative.    Genitourinary: Negative.    Musculoskeletal: Negative.    Skin: Positive for wound.   Allergic/Immunologic: Negative.    Neurological: Negative.   "  Hematological: Negative.    Psychiatric/Behavioral: Negative.        Objective   Physical Exam  Constitutional:       Appearance: She is well-developed.   HENT:      Head: Normocephalic and atraumatic.      Right Ear: Hearing, tympanic membrane and external ear normal.      Left Ear: Hearing, tympanic membrane and external ear normal.      Nose: Nose normal.      Mouth/Throat:      Mouth: Mucous membranes are moist.   Eyes:      Extraocular Movements: Extraocular movements intact.      Pupils: Pupils are equal, round, and reactive to light.   Neck:      Thyroid: No thyromegaly.   Cardiovascular:      Rate and Rhythm: Normal rate and regular rhythm.      Heart sounds: Normal heart sounds. No murmur heard.  Pulmonary:      Effort: Pulmonary effort is normal.      Breath sounds: Normal breath sounds.   Abdominal:      General: There is no distension.      Palpations: Abdomen is soft.      Tenderness: There is no abdominal tenderness.   Musculoskeletal:      Cervical back: Normal range of motion and neck supple.   Lymphadenopathy:      Cervical: No cervical adenopathy.   Skin:     General: Skin is warm and dry.      Findings: Erythema present.      Comments: approx 2.5 x 1 \" wound right calf w/ surrounding erythema and hyper pigmentation.      Neurological:      Mental Status: She is alert and oriented to person, place, and time.   Psychiatric:         Speech: Speech normal.         Behavior: Behavior normal.         Thought Content: Thought content normal.         Judgment: Judgment normal.          /68   Pulse 64   Ht 154.9 cm (61\")   Wt 59.1 kg (130 lb 6.4 oz)   SpO2 95%   BMI 24.64 kg/m²     Assessment & Plan   Diagnoses and all orders for this visit:    Wound of left lower extremity, initial encounter  -     Ambulatory Referral to Wound Clinic    Wound cellulitis    Other orders  -     cephalexin (Keflex) 500 MG capsule; Take 1 capsule by mouth 2 (Two) Times a Day.  -     saccharomyces boulardii " (Florastor) 250 MG capsule; Take 1 capsule by mouth 2 (Two) Times a Day.  -     mupirocin (BACTROBAN) 2 % ointment; Apply 1 application topically to the appropriate area as directed 3 (Three) Times a Day.      Patient w/ acute wound cellulitis. She had td 2019. She will receive rocephin inj x 1 today. Take keflex bid starting tomorrow with probiotic. Keep area clean. bactroban ointment and would dressings. To wound care clinic. She will follow up if worsens. To er if fever or if area extends beyond circled area of involvement.

## 2023-05-01 RX ORDER — MONTELUKAST SODIUM 10 MG/1
TABLET ORAL
Qty: 90 TABLET | Refills: 3 | Status: SHIPPED | OUTPATIENT
Start: 2023-05-01

## 2023-05-01 RX ORDER — LOSARTAN POTASSIUM 25 MG/1
TABLET ORAL
Qty: 90 TABLET | Refills: 3 | Status: SHIPPED | OUTPATIENT
Start: 2023-05-01

## 2023-05-08 ENCOUNTER — OFFICE VISIT (OUTPATIENT)
Dept: WOUND CARE | Facility: HOSPITAL | Age: 83
End: 2023-05-08
Payer: MEDICARE

## 2023-05-08 PROCEDURE — G0463 HOSPITAL OUTPT CLINIC VISIT: HCPCS

## 2023-05-15 ENCOUNTER — OFFICE VISIT (OUTPATIENT)
Dept: WOUND CARE | Facility: HOSPITAL | Age: 83
End: 2023-05-15
Payer: MEDICARE

## 2023-05-15 PROCEDURE — G0463 HOSPITAL OUTPT CLINIC VISIT: HCPCS

## 2023-05-25 ENCOUNTER — HOSPITAL ENCOUNTER (OUTPATIENT)
Dept: PHYSICAL THERAPY | Facility: HOSPITAL | Age: 83
Setting detail: THERAPIES SERIES
Discharge: HOME | End: 2023-05-25
Payer: MEDICARE

## 2023-05-25 DIAGNOSIS — M25.552 HIP PAIN, LEFT: Primary | ICD-10-CM

## 2023-05-25 DIAGNOSIS — R29.898 WEAKNESS OF RIGHT LOWER EXTREMITY: ICD-10-CM

## 2023-05-25 DIAGNOSIS — M25.60 RANGE OF MOTION DEFICIT: ICD-10-CM

## 2023-05-25 DIAGNOSIS — M54.50 LUMBAR PAIN: ICD-10-CM

## 2023-05-25 PROCEDURE — 97161 PT EVAL LOW COMPLEX 20 MIN: CPT

## 2023-05-25 PROCEDURE — 97110 THERAPEUTIC EXERCISES: CPT

## 2023-05-25 NOTE — THERAPY EVALUATION
Outpatient Physical Therapy Ortho Initial Evaluation  [facility]     Patient Name: Johana Corrigan  : 1940  MRN: [id number]  Today's Date: 2023      Visit Date: 2023    Patient Active Problem List   Diagnosis   • Atopic rhinitis   • Osteoarthritis   • Hyperlipidemia   • Hypothyroidism   • Leukopenia   • Depressed mood   • Essential hypertension   • Mass of right lower extremity   • Venous insufficiency        Past Medical History:   Diagnosis Date   • Allergic    • Arthritis    • Cataract    • Dyspnea    • Headache    • History of anemia    • Hyperlipidemia    • Hypertension    • Hyperthyroidism    • Leukopenia    • Osteoarthritis         Past Surgical History:   Procedure Laterality Date   • BRONCHOSCOPY N/A 2018    Procedure: BRONCHOSCOPY WITH;  Surgeon: Gee Kenney MD;  Location: [facility] ENDOSCOPY;  Service: Pulmonary   • CATARACT EXTRACTION Bilateral    • COLONOSCOPY     • EXCISION MASS LEG Right 2021    Procedure: LOWER EXTREMITY EXCISION LESION/CYST;  Surgeon: Anatoly Jones MD;  [place] [facility] [place]  Service: General;  Laterality: Right;   • FOOT SURGERY Left     bone spur   • KNEE ASPIRATION Right 2021    rt knee mass   • NOSE SURGERY     • SUBMANDIBULAR GLAND EXCISION     • TONSILLECTOMY     • TUBAL ABDOMINAL LIGATION         Visit Dx:     ICD-10-CM ICD-9-CM   1. Hip pain, left  M25.552 719.45   2. Lumbar pain  M54.50 724.2   3. Weakness of right lower extremity  R29.898 729.89   4. Range of motion deficit  M25.60 719.50          Patient History     Row Name 23 1700             History    Chief Complaint Pain  -JA      Type of Pain Back pain;Hip pain  L  -JA      Date Current Problem(s) Began --  exacerbation  -JA      Brief Description of Current Complaint Pain is the primary problem, it doesn't keep her from doing things because she has a high pain tolerance and will do what she needs to do. It causes her to lose sleep and become irritable.  -JA       Patient/Caregiver Goals Relieve pain;Know what to do to help the symptoms  -JA      Hand Dominance left-handed  -JA      Occupation/sports/leisure activities [employer]  -MALVIN         Pain     Pain Location Back;Hip  -MALVIN      What Performance Factors Make the Current Problem(s) WORSE? lifting, some walking but doesn't stop her  -JA      Is your sleep disturbed? Yes  -JA         Fall Risk Assessment    Any falls in the past year: Unspecified  -JA         Daily Activities    Primary Language English  -JA      Are you able to read Yes  -JA      Are you able to write Yes  -MALVIN      How does patient learn best? Listening;Pictures/Video;Reading  -JA      Teaching needs identified Home Exercise Program;Management of Condition  -JA      Barriers to learning None  -JA      Recommended Referrals Physical Therapy  -JA      Pt Participated in POC and Goals Yes  -JA            User Key  (r) = Recorded By, (t) = Taken By, (c) = Cosigned By    Initials Name Provider Type    Maritza Larson, PT Physical Therapist                 PT Ortho     Row Name 05/25/23 1000       Quarter Clearing    Quarter Clearing Lower Quarter Clearing  -JA       Neural Tension Signs- Lower Quarter Clearing    SLR Left:  weak  -JA       Myotomal Screen- Lower Quarter Clearing    Hip flexion (L2) Left:;3+ (Fair +);Right:;4 (Good);4+ (Good +)  -JA    Knee extension (L3) Left:;3+ (Fair +);Right:;4+ (Good +)  -JA    Ankle DF (L4) Left:;4- (Good -);Right:;4 (Good)  -JA    Ankle PF (S1) Left:;4- (Good -);Right:;4 (Good)  -JA    Knee flexion (S2) Left:;3+ (Fair +);Right:;4+ (Good +)  -JA       Lumbar ROM Screen- Lower Quarter Clearing    Lumbar Flexion Normal  -JA    Lumbar Extension Impaired  -JA    Lumbar Lateral Flexion Impaired  alexsander 30% w/pain/pull  -JA    Lumbar Rotation Impaired  25%, discomfort  -JA       SI/Hip Screen- Lower Quarter Clearing    Charlene's/George's test Left:;Positive;Right:;Negative  -JA       Special Tests/Palpation     Special Tests/Palpation Lumbar/SI  -JA       Lumbosacral Palpation    Lumbosacral Palpation? --  pain lumbar spine L4-L5, L5-S1; L PSIS  -JA       MMT (Manual Muscle Testing)    Rt Lower Ext Rt Hip Internal (Medial) Rotation;Rt Hip External (Lateral) Rotation;Rt Hip ABduction;Rt Hip Extension  -JA    Lt Lower Ext Lt Hip Extension;Lt Hip ABduction;Lt Hip Internal (Medial) Rotation;Lt Hip External (Lateral) Rotation  -JA       MMT Right Lower Ext    Rt Hip Extension MMT, Gross Movement (4/5) good  -JA    Rt Hip ABduction MMT, Gross Movement (4/5) good  -JA    Rt Hip Internal (Medial) Rotation MMT, Gross Movement (4-/5) good minus  -JA    Rt Hip External (Lateral) Rotation MMT, Gross Movement (4-/5) good minus  -JA       MMT Left Lower Ext    Lt Hip Extension MMT, Gross Movement (3+/5) fair plus  -JA    Lt Hip ABduction MMT, Gross Movement (3+/5) fair plus  -JA    Lt Hip Internal (Medial) Rotation MMT, Gross Movement (3+/5) fair plus  -JA    Lt Hip External (Lateral) Rotation MMT, Gross Movement (3/5) fair  -JA       Balance Skills Training    SLS L 3sec then 7 sec; R 10 sec+  -MALVIN       Transfers    Comment, (Transfers) 0-1 UE support  -MALVIN       Gait/Stairs (Locomotion)    Comment, (Gait/Stairs) asymmetrical stride, decreased heel strike, increased wt shift  -MALVIN          User Key  (r) = Recorded By, (t) = Taken By, (c) = Cosigned By    Initials Name Provider Type    Maritza Larson, PT Physical Therapist                            Therapy Education  Education Details: [id number] Discussed LE and core weakness in relationship to pain. Initiated HEP.  Given: HEP  Program: New  How Provided: Verbal, Demonstration, Written  Provided to: Patient  Level of Understanding: Verbalized, Demonstrated      PT OP Goals     Row Name 05/25/23 1000          PT Short Term Goals    STG Date to Achieve 06/24/23  -MALVIN     STG 1 Patient will be independent and compliant with initial HEP.  -MALVIN     STG 1 Progress New  -MALVIN     STG 2  Patient will demonstrate correct posture in sitting and standing to decrease strain/pain on spine.  -MALVIN     STG 2 Progress New  -JA     STG 3 Pt will demonstrate correct body mechanics with bending, reaching, and lifting ADL’s.  -MALVIN     STG 3 Progress New  -MALVIN        Long Term Goals    LTG Date to Achieve 07/24/23  -MALVIN     LTG 1 Pt will be independent with advanced HEP for spinal stabilization and LE/core strengthening.  -JA     LTG 1 Progress New  -JA     LTG 2 Pt will score 24% or less on Oswestry Disability Index indicating decrease in perceived functional disability.  -JA     LTG 2 Progress New  -JA     LTG 3 Pt will report 50% decrease in pain with ADLs/sleep.  -JA     LTG 3 Progress New  -JA     LTG 4 Pt will demonstrate increased L hip/LE strength to 4-4+/5 to facilitate ease of functional mobility.  -MALVIN     LTG 4 Progress New  -MALVIN        Time Calculation    PT Goal Re-Cert Due Date 08/23/23  -MALVIN           User Key  (r) = Recorded By, (t) = Taken By, (c) = Cosigned By    Initials Name Provider Type    Maritza Larson, PT Physical Therapist                 PT Assessment/Plan     Row Name 05/25/23 1700          PT Assessment    Functional Limitations Limitation in home management;Limitations in functional capacity and performance;Performance in leisure activities;Impaired gait  -MALVIN     Impairments Pain;Muscle strength;Range of motion;Posture;Poor body mechanics;Gait  -MALVIN     Assessment Comments Johana Corrigan is an active 82 y.o. female referred to outpatient physical therapy for evaluation and treatment of left hip pain and lumbar pain.  Patient presents with decreased trunk mobility lateral bend and rotation, significant weakness in L LE, point tenderness midline lower lumbar spine and L lateral hip. Signs and symptoms are consistent with referring diagnosis.  Pertinent comorbidities and personal factors that may affect progress include, but are not limited to, previous LBP episode 2020, pt-reported  high pain tolerance that does not stop her, Modified Oswestry score of 34%.  This condition is stable. Recommend skilled PT to address functional deficits. Thank you for this referral.  -MALVIN     Please refer to paper survey for additional self-reported information Yes  -JA     Rehab Potential Good  -MALVIN     Patient/caregiver participated in establishment of treatment plan and goals Yes  -     Patient would benefit from skilled therapy intervention Yes  -JA        PT Plan    PT Frequency 1x/week;2x/week  -MALVIN     Predicted Duration of Therapy Intervention (PT) 8 visits  -     Planned CPT's? PT EVAL LOW COMPLEXITY: 74082;PT RE-EVAL: 59236;PT THER PROC EA 15 MIN: 89284;PT THER ACT EA 15 MIN: 63863;PT MANUAL THERAPY EA 15 MIN: 04752;PT NEUROMUSC RE-EDUCATION EA 15 MIN: 42332;PT GAIT TRAINING EA 15 MIN: 45699  -     PT Plan Comments assess response to initial HEP; warm-up on Nustep, work on body mechanics and transitions,  -           User Key  (r) = Recorded By, (t) = Taken By, (c) = Cosigned By    Initials Name Provider Type    Maritza Larson, PT Physical Therapist                   OP Exercises     Row Name 05/25/23 1000             Subjective Comments    Subjective Comments initial eval  pt arrived 10:26 for 10:15 appt, c/o parking issue  -JA         Total Minutes    64103 - PT Therapeutic Exercise Minutes 15  -JA         Exercise 1    Exercise Name 1 Nustep Next visit  for warm-up  -JA         Exercise 2    Exercise Name 2 seated pelvic tilt  -JA      Cueing 2 Verbal;Tactile;Demo  -JA      Reps 2 5  -JA         Exercise 3    Exercise Name 3 supine PPT  -JA      Cueing 3 Verbal;Tactile  -JA      Reps 3 10  -JA      Time 3 5sec  -JA         Exercise 4    Exercise Name 4 LTR  -JA      Cueing 4 Verbal  -JA      Reps 4 10  -JA      Time 4 3-5sec  -JA      Additional Comments in pain-free range  -JA         Exercise 5    Exercise Name 5 DKC  -JA      Cueing 5 Verbal;Demo  -JA      Reps 5 3  -JA      Time 5  20sec  -JA         Exercise 6    Exercise Name 6 supine ham 90/90 stretch  -JA      Cueing 6 Verbal;Tactile  -JA      Reps 6 3  -JA      Time 6 20sec  -JA         Exercise 7    Exercise Name 7 BKFO w/TA  -JA      Cueing 7 Verbal;Demo  -JA      Reps 7 10 alt R/L, cued slowly, smoothly, and w/control  -JA            User Key  (r) = Recorded By, (t) = Taken By, (c) = Cosigned By    Initials Name Provider Type    Maritza Larson, PT Physical Therapist                              Outcome Measure Options: Modified Oswestry  Modified Oswestry  Modified Oswestry Score/Comments: 34%, VAS 3      Time Calculation:     Start Time: 1026  Stop Time: 1105  Time Calculation (min): 39 min  Timed Charges  98465 - PT Therapeutic Exercise Minutes: 15  Untimed Charges  PT Eval/Re-eval Minutes: 23  Total Minutes  Timed Charges Total Minutes: 15  Untimed Charges Total Minutes: 23   Total Minutes: 38     Therapy Charges for Today     Code Description Service Date Service Provider Modifiers Qty    24344783432 HC PT THER PROC EA 15 MIN 5/25/2023 Maritza Gordon, PT GP 1    81233920461 HC PT EVAL LOW COMPLEXITY 2 5/25/2023 Maritza Gordon, PT GP 1          PT G-Codes  Outcome Measure Options: Modified Oswestry  Modified Oswestry Score/Comments: 34%, VAS 3         Maritza Gordon PT  5/25/2023

## 2023-07-25 ENCOUNTER — HOSPITAL ENCOUNTER (OUTPATIENT)
Dept: PHYSICAL THERAPY | Facility: HOSPITAL | Age: 83
Setting detail: THERAPIES SERIES
Discharge: HOME | End: 2023-07-25
Payer: MEDICARE

## 2023-07-25 DIAGNOSIS — I89.0 LYMPHEDEMA: Primary | ICD-10-CM

## 2023-07-25 PROCEDURE — 97162 PT EVAL MOD COMPLEX 30 MIN: CPT

## 2023-07-25 NOTE — THERAPY EVALUATION
Physical Therapy Lymphedema Initial Evaluation  [facility]     Patient Name: Johana Corrigan  : 1940  MRN: [id number]  Today's Date: 2023      Visit Date: 2023    Visit Dx:    ICD-10-CM ICD-9-CM   1. Lymphedema  I89.0 457.1       Patient Active Problem List   Diagnosis    Atopic rhinitis    Osteoarthritis    Hyperlipidemia    Hypothyroidism    Leukopenia    Depressed mood    Essential hypertension    Mass of right lower extremity    Venous insufficiency        Past Medical History:   Diagnosis Date    Allergic     Arthritis     Cataract     Dyspnea     Headache     History of anemia     Hyperlipidemia     Hypertension     Hyperthyroidism     Leukopenia     Osteoarthritis         Past Surgical History:   Procedure Laterality Date    BRONCHOSCOPY N/A 2018    Procedure: BRONCHOSCOPY WITH;  Surgeon: Gee Kenney MD;  Location: [facility] ENDOSCOPY;  Service: Pulmonary    CATARACT EXTRACTION Bilateral     COLONOSCOPY      EXCISION MASS LEG Right 2021    Procedure: LOWER EXTREMITY EXCISION LESION/CYST;  Surgeon: Anatoly Jones MD;  [place] [facility] [place] [facility] [place]  Service: General;  Laterality: Right;    FOOT SURGERY Left     bone spur    KNEE ASPIRATION Right 2021    rt knee mass    NOSE SURGERY      SUBMANDIBULAR GLAND EXCISION      TONSILLECTOMY      TUBAL ABDOMINAL LIGATION         Visit Dx:    ICD-10-CM ICD-9-CM   1. Lymphedema  I89.0 457.1        Patient History       Row Name 23 1100             History    Chief Complaint Swelling;Tightness  -      Date Current Problem(s) Began 23  -      Brief Description of Current Complaint Pt has had swelling in legs, enma left, has stockings which she had not previuosly worn daily but lately had been much more consisitent with wear and legs are going down.  -KD      Patient/Caregiver Goals Know what to do to help the symptoms;Decrease swelling  -      Hand Dominance left-handed  -      Occupation/sports/leisure activities very  active  -KD      How has patient tried to help current problem? compression stockings  -KD      What clinical tests have you had for this problem? Other 1 (comment)  venous doppler  -KD      Results of Clinical Tests negative for DVT  -KD      Are you or can you be pregnant No  -KD         Pain     Pain Location Leg  -KD      Pain at Present 0  -KD      What Performance Factors Make the Current Problem(s) WORSE? worse in p.m.  -KD      What Performance Factors Make the Current Problem(s) BETTER? better in a.m.  -KD      Is your sleep disturbed? Yes  -KD      Difficulties with recreational activities? difficulty with leisure activities  -KD         Fall Risk Assessment    Any falls in the past year: No  -KD         Services    Are you currently receiving Home Health services No  -KD         Daily Activities    Primary Language English  -KD      Are you able to read Yes  -KD      Are you able to write Yes  -KD      How does patient learn best? Demonstration;Reading  -KD      Teaching needs identified Home Exercise Program;Management of Condition  -KD      Barriers to learning None  -KD         Safety    Are you being hurt, hit, or frightened by anyone at home or in your life? No  -KD      Are you being neglected by a caregiver No  -KD                User Key  (r) = Recorded By, (t) = Taken By, (c) = Cosigned By      Initials Name Provider Type    KD Nazanin Allan, PT Physical Therapist                     Lymphedema       Row Name 07/25/23 1100             Subjective Pain    Able to rate subjective pain? yes  -KD      Pre-Treatment Pain Level 0  -KD      Subjective Pain Comment no pain in legs except sometimes with scabbed area on right leg  -KD         Subjective Comments    Subjective Comments States she has started wearing her compression stockings more consisently per MD recommendation and legs are improving.  -KD         Lymphedema Assessment    Lymphedema Classification RLE:;LLE:;stage 2 (Spontaneously  "Irreversible)  -KD      Infections or Cellulitis? no  -KD         LLIS - Physical Concerns    The amount of pain associated with my lymphedema is: 2  -KD      The amount of limb heaviness associated with my lymphedema is: 2  -KD      The amount of skin tightness associated with my lymphedema is: 2  -KD      The size of my swollen limb(s) seems: 3  -KD      Lymphedema affects the movement of my swollen limb(s): 0  -KD      The strength in my swollen limb(s) is: 0  -KD         LLIS - Psychosocial Concerns    Lymphedema affects my body image (i.e., \"how I think I look\"). 2  -KD      Lymphedema affects my socializing with others. 0  -KD      Lymphedema affects my intimate relations with spouse or partner (rate 0 if not applicable 0  -KD      Lymphedema \"gets me down\" (i.e., depression, frustration, or anger) 0  -KD      I must rely on others for help due to my lymphedema. 0  -KD      I know what to do to manage my lymphedema 2  -KD         LLIS - Functional Concerns    Lymphedema affects my ability to perform self-care activities (i.e. eating, dressing, hygiene) 0  -KD      Lymphedema affects my ability to perform routine home or work-related activities. 0  -KD      Lymphedema affects my performance of preferred leisure activities. 3  -KD      Lymphedema affects proper fit of clothing/shoes 2  -KD      Lymphedema affects my sleep 2  -KD         Posture/Observations    Posture/Observations Comments Amb independently without gait issues.  -KD         General ROM    GENERAL ROM COMMENTS Gen WFL for LEs  -KD         MMT (Manual Muscle Testing)    General MMT Comments At least 3+ to 4-/5 for LEs generally  -KD         Lymphedema Edema Assessment    Edema Assessment Comment Mild, soft edema bilat feet to knees, L>R  -KD         Skin Changes/Observations    Skin Observations Comment Noted a scabbed area on each lower leg, bruising on left; general dark reddish discoloration B lower legs, min dryness. No weeping/open areas.  " -KD         Lymphedema Sensation    Lymphedema Sensation Comments Intact to lt touch. No reports of N/T.  -KD         Lymphedema Measurements    Measurement Type(s) Circumferential  -KD      Circumferential Areas Lower extremities  -KD         BLE Circumferential (cm)    Measurement Location 1 Knee  -KD      Left 1 30.7 cm  -KD      Right 1 31.5 cm  -KD      Measurement Location 2 10cm below knee  -KD      Left 2 32.5 cm  -KD      Right 2 31.5 cm  -KD      Measurement Location 3 20cm below knee  -KD      Left 3 27 cm  -KD      Right 3 25 cm  -KD      Measurement Location 4 30cm below knee  -KD      Left 4 22.5 cm  -KD      Right 4 20.5 cm  -KD      Measurement Location 5 Ankle  -KD      Left 5 23 cm  -KD      Right 5 22.7 cm  -KD      Measurement Location 6 Midfoot  -KD      Left 6 20.5 cm  -KD      Right 6 20 cm  -KD      LLE Circumferential Total 156.2 cm  -KD      RLE Circumferential Total 151.2 cm  -KD         Lymphedema Life Impact Scale Totals    A.  Total Q1 - Q17 (Do not include Q18) 20  -KD      B.  Total number of questions answered (Q1-Q17) 17  -KD      C. Divide A by B 1.18  -KD      D. Multiple C by 25 29.5  -KD                User Key  (r) = Recorded By, (t) = Taken By, (c) = Cosigned By      Initials Name Provider Type    Nazanin Camp, PT Physical Therapist                                    Therapy Education  Education Details: Reviewed condtion/treatment protocol. Adivised pt re:garment wear and replacement. Reviewed Healthy Habits for Patients at Risk for Lymphedema; instr in HEP.  Given: HEP, Symptoms/condition management, Edema management  Program: New  How Provided: Verbal, Written  Provided to: Patient  Level of Understanding: Verbalized       OP Exercises       Row Name 07/25/23 1100             Subjective Comments    Subjective Comments States she has started wearing her compression stockings more consisently per MD recommendation and legs are improving.  -KD         Subjective Pain     Able to rate subjective pain? yes  -KD      Pre-Treatment Pain Level 0  -KD      Subjective Pain Comment no pain in legs except sometimes with scabbed area on right leg  -KD                User Key  (r) = Recorded By, (t) = Taken By, (c) = Cosigned By      Initials Name Provider Type    Nazanin Camp, PT Physical Therapist                                 PT OP Goals       Row Name 07/25/23 1100          PT Short Term Goals    STG Date to Achieve 07/25/23  -KD     STG 1 Pt to demonstrate awareness of condition and precautions for improved prevention, management, care of symptoms, and ease of transition to self-care of condition.  -KD     STG 1 Progress Met  -KD     STG 2 Pt to have good understanding of garment wear and care/replacement.  -KD     STG 2 Progress Met  -KD        Long Term Goals    LTG 1 None at this time.  -KD        Time Calculation    PT Goal Re-Cert Due Date 10/23/23  -KD               User Key  (r) = Recorded By, (t) = Taken By, (c) = Cosigned By      Initials Name Provider Type    Nazanin Camp, PT Physical Therapist                     PT Assessment/Plan       Row Name 07/25/23 1135          PT Assessment    Functional Limitations Performance in leisure activities;Other (comment)  Interference with sleep and proper fit of clothes/shoes  -KD     Impairments Edema;Impaired lymphatic circulation;Integumentary integrity  -KD     Assessment Comments Pt presents in the Lymphedema Clinic today with min, soft edema of  BLE's, L>R. Total circumferential measurements: R LE= 151.2 cm; L LE= 156.2cm. Skin on lower legs is discolored, fragile, min dry; noted scabs on lower legs and bruising on left lower leg.  Sensation is intact, no N/T.  General mobility is good. Lymphedema Lifestyle Impact Scale score is  29.5.   Functional concerns includelimitations in leisure activities, moderate interference with sleep, and moderate difficulty with proper fit of clothes/shoes. Physical concerns include limb  heaviness, tightness, and size. Pt seems to be seeing improvement in edema since she's been wearing compression stockings consistently. We reviewed information regarding condition, treatment protocol,  and edema management.  At this time feel she needs to continue to wear compression daily (am to bedtime) , replace garments every 6 months, and follow recommendations per information given her ([employer] recommendations). If she feels her legs are any worse or not improving, she is to call us to schedule therapy treatment.  -KD     Rehab Potential Good  -KD     Patient/caregiver participated in establishment of treatment plan and goals Yes  -KD     Patient would benefit from skilled therapy intervention No  -KD        PT Plan    PT Frequency One time visit  -KD     Planned CPT's? PT EVAL MOD COMPLELITY: 19106  -KD     Physical Therapy Interventions (Optional Details) home exercise program;patient/family education  -KD     PT Plan Comments Pt to follow self-management per recommendations, to call us if any further issues.  -KD               User Key  (r) = Recorded By, (t) = Taken By, (c) = Cosigned By      Initials Name Provider Type    KD Nazanin Allan, PT Physical Therapist                                 Time Calculation:   Start Time: 1010  Stop Time: 1100  Time Calculation (min): 50 min  Untimed Charges  PT Eval/Re-eval Minutes: 50  Total Minutes  Untimed Charges Total Minutes: 50   Total Minutes: 50   Therapy Charges for Today       Code Description Service Date Service Provider Modifiers Qty    45331241463 HC PT EVAL MOD COMPLEXITY 3 7/25/2023 Nazanin Allan, PT GP 1                      Nazanin Allan PT  7/25/2023

## 2023-07-28 ENCOUNTER — OFFICE VISIT (OUTPATIENT)
Dept: INTERNAL MEDICINE | Facility: CLINIC | Age: 83
End: 2023-07-28
Payer: MEDICARE

## 2023-07-28 ENCOUNTER — TELEPHONE (OUTPATIENT)
Dept: INTERNAL MEDICINE | Facility: CLINIC | Age: 83
End: 2023-07-28
Payer: MEDICARE

## 2023-07-28 VITALS
HEART RATE: 72 BPM | DIASTOLIC BLOOD PRESSURE: 72 MMHG | SYSTOLIC BLOOD PRESSURE: 132 MMHG | OXYGEN SATURATION: 96 % | BODY MASS INDEX: 23.79 KG/M2 | WEIGHT: 126 LBS | HEIGHT: 61 IN

## 2023-07-28 DIAGNOSIS — R35.0 URINE FREQUENCY: Primary | ICD-10-CM

## 2023-07-28 LAB
BILIRUB BLD-MCNC: NEGATIVE MG/DL
CLARITY, POC: ABNORMAL
COLOR UR: ABNORMAL
EXPIRATION DATE: ABNORMAL
GLUCOSE UR STRIP-MCNC: NEGATIVE MG/DL
KETONES UR QL: NEGATIVE
LEUKOCYTE EST, POC: ABNORMAL
Lab: ABNORMAL
NITRITE UR-MCNC: POSITIVE MG/ML
PH UR: 7 [PH] (ref 5–8)
PROT UR STRIP-MCNC: ABNORMAL MG/DL
RBC # UR STRIP: ABNORMAL /UL
SP GR UR: 1.01
UROBILINOGEN UR QL: ABNORMAL

## 2023-07-28 PROCEDURE — 1160F RVW MEDS BY RX/DR IN RCRD: CPT | Performed by: INTERNAL MEDICINE

## 2023-07-28 PROCEDURE — 81003 URINALYSIS AUTO W/O SCOPE: CPT | Performed by: INTERNAL MEDICINE

## 2023-07-28 PROCEDURE — 3075F SYST BP GE 130 - 139MM HG: CPT | Performed by: INTERNAL MEDICINE

## 2023-07-28 PROCEDURE — 1159F MED LIST DOCD IN RCRD: CPT | Performed by: INTERNAL MEDICINE

## 2023-07-28 PROCEDURE — 3078F DIAST BP <80 MM HG: CPT | Performed by: INTERNAL MEDICINE

## 2023-07-28 PROCEDURE — 99213 OFFICE O/P EST LOW 20 MIN: CPT | Performed by: INTERNAL MEDICINE

## 2023-07-28 RX ORDER — PHENAZOPYRIDINE HYDROCHLORIDE 200 MG/1
200 TABLET, FILM COATED ORAL 3 TIMES DAILY PRN
Qty: 15 TABLET | Refills: 0 | Status: SHIPPED | OUTPATIENT
Start: 2023-07-28

## 2023-07-28 RX ORDER — SACCHAROMYCES BOULARDII 250 MG
250 CAPSULE ORAL 2 TIMES DAILY
Qty: 20 CAPSULE | Refills: 0 | Status: SHIPPED | OUTPATIENT
Start: 2023-07-28

## 2023-07-28 RX ORDER — AMOXICILLIN 500 MG/1
500 CAPSULE ORAL 2 TIMES DAILY
Qty: 14 CAPSULE | Refills: 0 | Status: SHIPPED | OUTPATIENT
Start: 2023-07-28

## 2023-07-28 NOTE — PROGRESS NOTES
Chief Complaint   Patient presents with    Urinary Frequency       History of Present Illness   Johana Corrigan is a 82 y.o. female presents for acute care. Increased urinary frequency, urgency. She has started increasing hydration w/ water recently. Patient notes some increased stressors recently.   U/A tested. Has large leukocytes, positive nitrites, trace protein.       The following portions of the patient's history were reviewed and updated as appropriate: allergies, current medications, past family history, past medical history, past social history, past surgical history and problem list.  Current Outpatient Medications on File Prior to Visit   Medication Sig Dispense Refill    azelastine (ASTELIN) 0.1 % nasal spray 2 sprays into the nostril(s) as directed by provider 2 (Two) Times a Day. Use in each nostril as directed 1 each 12    bacitracin 500 UNIT/GM ointment Apply 1 application topically to the appropriate area as directed Daily. 14 g 0    Calcium-Phosphorus-Vitamin D (CALCIUM/D3 ADULT GUMMIES PO) Take 2 tablets by mouth Every Morning.      hydroCHLOROthiazide (HYDRODIURIL) 12.5 MG tablet TAKE 1 TABLET EVERY DAY 90 tablet 3    losartan (COZAAR) 25 MG tablet TAKE 1 TABLET EVERY DAY 90 tablet 3    montelukast (SINGULAIR) 10 MG tablet TAKE 1 TABLET EVERY NIGHT 90 tablet 3    mupirocin (BACTROBAN) 2 % ointment Apply 1 application topically to the appropriate area as directed 3 (Three) Times a Day. 22 g 1    rosuvastatin (CRESTOR) 5 MG tablet Take 1 tablet by mouth Daily. 90 tablet 3    saccharomyces boulardii (Florastor) 250 MG capsule Take 1 capsule by mouth 2 (Two) Times a Day. 20 capsule 0    Synthroid 50 MCG tablet Take 1 tablet by mouth Daily. 90 tablet 1     No current facility-administered medications on file prior to visit.     Review of Systems   Constitutional: Negative.    HENT: Negative.     Eyes: Negative.    Respiratory: Negative.     Cardiovascular: Negative.    Gastrointestinal: Negative.   "  Endocrine: Negative.    Genitourinary:  Positive for dysuria, frequency and urgency.   Musculoskeletal: Negative.    Allergic/Immunologic: Negative.    Neurological: Negative.    Hematological: Negative.    Psychiatric/Behavioral: Negative.       Objective   Physical Exam  Vitals and nursing note reviewed.   Constitutional:       Appearance: Normal appearance.   HENT:      Head: Normocephalic and atraumatic.      Right Ear: Tympanic membrane normal.      Left Ear: Tympanic membrane normal.      Nose: Nose normal.      Mouth/Throat:      Mouth: Mucous membranes are moist.   Eyes:      Extraocular Movements: Extraocular movements intact.      Pupils: Pupils are equal, round, and reactive to light.   Cardiovascular:      Rate and Rhythm: Normal rate and regular rhythm.      Pulses: Normal pulses.      Heart sounds: Normal heart sounds.   Pulmonary:      Effort: Pulmonary effort is normal.      Breath sounds: Normal breath sounds.   Abdominal:      General: Abdomen is flat.      Palpations: Abdomen is soft.   Musculoskeletal:         General: Normal range of motion.      Cervical back: Normal range of motion.   Skin:     General: Skin is warm and dry.   Neurological:      General: No focal deficit present.      Mental Status: She is alert and oriented to person, place, and time.   Psychiatric:         Mood and Affect: Mood normal.         Behavior: Behavior normal.         Thought Content: Thought content normal.         Judgment: Judgment normal.        /72   Pulse 72   Ht 154.9 cm (60.98\")   Wt 57.2 kg (126 lb)   SpO2 96%   BMI 23.82 kg/m²     Assessment & Plan   Diagnoses and all orders for this visit:    Urine frequency  -     POC Urinalysis Dipstick, Automated  -     Urine Culture - Urine, Urine, Clean Catch      Patient w/ urinary frequency and urgency. U/A suggestive of UTI. Will start empyric amoxil based on prior urine cultures. She will hydrate well. She may take pyridium in a prn fashion. May " take cranberry tablets. She is to avoid caffeine or sugar filled liquids. Will get urine culture. F/u if worsens of fails to improve.

## 2023-07-28 NOTE — TELEPHONE ENCOUNTER
Caller: Johana Corrigan    Relationship: Self    Best call back number: [phone]    What medication are you requesting: MEDICATION FOR UTI     What are your current symptoms: BURNING WITH URINATION     How long have you been experiencing symptoms: YES     Have you had these symptoms before:    [x] Yes  [] No    Have you been treated for these symptoms before:   [x] Yes  [] No    If a prescription is needed, what is your preferred pharmacy and phone number: [id number] [address] AT [facility] [address] - [phone]  - [phone] FX     Additional notes: DECLINED APPOINTMENT

## 2023-08-02 LAB
BACTERIA UR CULT: ABNORMAL
BACTERIA UR CULT: ABNORMAL
OTHER ANTIBIOTIC SUSC ISLT: ABNORMAL

## 2023-08-03 ENCOUNTER — APPOINTMENT (OUTPATIENT)
Dept: PHYSICAL THERAPY | Facility: HOSPITAL | Age: 83
End: 2023-08-03
Payer: MEDICARE

## 2023-08-04 ENCOUNTER — TELEPHONE (OUTPATIENT)
Dept: INTERNAL MEDICINE | Facility: CLINIC | Age: 83
End: 2023-08-04
Payer: MEDICARE

## 2023-08-04 RX ORDER — CEPHALEXIN 500 MG/1
500 CAPSULE ORAL 2 TIMES DAILY
Qty: 14 CAPSULE | Refills: 0 | Status: SHIPPED | OUTPATIENT
Start: 2023-08-04 | End: 2023-08-04 | Stop reason: SDUPTHER

## 2023-08-04 RX ORDER — CEPHALEXIN 500 MG/1
500 CAPSULE ORAL 2 TIMES DAILY
Qty: 14 CAPSULE | Refills: 0 | Status: SHIPPED | OUTPATIENT
Start: 2023-08-04

## 2023-08-10 ENCOUNTER — APPOINTMENT (OUTPATIENT)
Dept: PHYSICAL THERAPY | Facility: HOSPITAL | Age: 83
End: 2023-08-10
Payer: MEDICARE

## 2023-08-17 ENCOUNTER — APPOINTMENT (OUTPATIENT)
Dept: PHYSICAL THERAPY | Facility: HOSPITAL | Age: 83
End: 2023-08-17
Payer: MEDICARE

## 2023-08-31 DIAGNOSIS — E03.9 HYPOTHYROIDISM, UNSPECIFIED TYPE: ICD-10-CM

## 2023-08-31 DIAGNOSIS — I10 ESSENTIAL HYPERTENSION: ICD-10-CM

## 2023-08-31 DIAGNOSIS — E78.5 HYPERLIPIDEMIA, UNSPECIFIED HYPERLIPIDEMIA TYPE: Primary | ICD-10-CM

## 2023-09-07 LAB
ALBUMIN SERPL-MCNC: 4.3 G/DL (ref 3.5–5.2)
ALBUMIN/GLOB SERPL: 2.5 G/DL
ALP SERPL-CCNC: 58 U/L (ref 39–117)
ALT SERPL-CCNC: 15 U/L (ref 1–33)
APPEARANCE UR: CLEAR
AST SERPL-CCNC: 20 U/L (ref 1–32)
BACTERIA #/AREA URNS HPF: NORMAL /HPF
BACTERIA UR CULT: NORMAL
BACTERIA UR CULT: NORMAL
BASOPHILS # BLD AUTO: 0.04 10*3/MM3 (ref 0–0.2)
BASOPHILS NFR BLD AUTO: 1 % (ref 0–1.5)
BILIRUB SERPL-MCNC: 0.5 MG/DL (ref 0–1.2)
BILIRUB UR QL STRIP: NEGATIVE
BUN SERPL-MCNC: 17 MG/DL (ref 8–23)
BUN/CREAT SERPL: 23 (ref 7–25)
CALCIUM SERPL-MCNC: 9.8 MG/DL (ref 8.6–10.5)
CASTS URNS QL MICRO: NORMAL /LPF
CHLORIDE SERPL-SCNC: 95 MMOL/L (ref 98–107)
CHOLEST SERPL-MCNC: 250 MG/DL (ref 0–200)
CO2 SERPL-SCNC: 25.5 MMOL/L (ref 22–29)
COLOR UR: YELLOW
CREAT SERPL-MCNC: 0.74 MG/DL (ref 0.57–1)
EGFRCR SERPLBLD CKD-EPI 2021: 80.9 ML/MIN/1.73
EOSINOPHIL # BLD AUTO: 0.07 10*3/MM3 (ref 0–0.4)
EOSINOPHIL NFR BLD AUTO: 1.8 % (ref 0.3–6.2)
EPI CELLS #/AREA URNS HPF: NORMAL /HPF (ref 0–10)
ERYTHROCYTE [DISTWIDTH] IN BLOOD BY AUTOMATED COUNT: 12.3 % (ref 12.3–15.4)
GLOBULIN SER CALC-MCNC: 1.7 GM/DL
GLUCOSE SERPL-MCNC: 91 MG/DL (ref 65–99)
GLUCOSE UR QL STRIP: NEGATIVE
HCT VFR BLD AUTO: 38.4 % (ref 34–46.6)
HDLC SERPL-MCNC: 75 MG/DL (ref 40–60)
HGB BLD-MCNC: 13.6 G/DL (ref 12–15.9)
HGB UR QL STRIP: NEGATIVE
IMM GRANULOCYTES # BLD AUTO: 0.01 10*3/MM3 (ref 0–0.05)
IMM GRANULOCYTES NFR BLD AUTO: 0.3 % (ref 0–0.5)
KETONES UR QL STRIP: NEGATIVE
LDLC SERPL CALC-MCNC: 165 MG/DL (ref 0–100)
LEUKOCYTE ESTERASE UR QL STRIP: ABNORMAL
LYMPHOCYTES # BLD AUTO: 1.41 10*3/MM3 (ref 0.7–3.1)
LYMPHOCYTES NFR BLD AUTO: 36.8 % (ref 19.6–45.3)
MCH RBC QN AUTO: 31.6 PG (ref 26.6–33)
MCHC RBC AUTO-ENTMCNC: 35.4 G/DL (ref 31.5–35.7)
MCV RBC AUTO: 89.3 FL (ref 79–97)
MICRO URNS: ABNORMAL
MONOCYTES # BLD AUTO: 0.38 10*3/MM3 (ref 0.1–0.9)
MONOCYTES NFR BLD AUTO: 9.9 % (ref 5–12)
NEUTROPHILS # BLD AUTO: 1.92 10*3/MM3 (ref 1.7–7)
NEUTROPHILS NFR BLD AUTO: 50.2 % (ref 42.7–76)
NITRITE UR QL STRIP: NEGATIVE
NRBC BLD AUTO-RTO: 0.3 /100 WBC (ref 0–0.2)
PH UR STRIP: 7.5 [PH] (ref 5–7.5)
PLATELET # BLD AUTO: 199 10*3/MM3 (ref 140–450)
POTASSIUM SERPL-SCNC: 3.6 MMOL/L (ref 3.5–5.2)
PROT SERPL-MCNC: 6 G/DL (ref 6–8.5)
PROT UR QL STRIP: NEGATIVE
RBC # BLD AUTO: 4.3 10*6/MM3 (ref 3.77–5.28)
RBC #/AREA URNS HPF: NORMAL /HPF (ref 0–2)
SODIUM SERPL-SCNC: 133 MMOL/L (ref 136–145)
SP GR UR STRIP: 1.01 (ref 1–1.03)
TRIGL SERPL-MCNC: 60 MG/DL (ref 0–150)
TSH SERPL DL<=0.005 MIU/L-ACNC: 1.46 UIU/ML (ref 0.27–4.2)
URINALYSIS REFLEX: ABNORMAL
UROBILINOGEN UR STRIP-MCNC: 1 MG/DL (ref 0.2–1)
VLDLC SERPL CALC-MCNC: 10 MG/DL (ref 5–40)
WBC # BLD AUTO: 3.83 10*3/MM3 (ref 3.4–10.8)
WBC #/AREA URNS HPF: NORMAL /HPF (ref 0–5)

## 2023-09-08 ENCOUNTER — OFFICE VISIT (OUTPATIENT)
Dept: INTERNAL MEDICINE | Facility: CLINIC | Age: 83
End: 2023-09-08
Payer: MEDICARE

## 2023-09-08 VITALS
HEART RATE: 62 BPM | HEIGHT: 61 IN | SYSTOLIC BLOOD PRESSURE: 146 MMHG | OXYGEN SATURATION: 97 % | BODY MASS INDEX: 23.22 KG/M2 | WEIGHT: 123 LBS | DIASTOLIC BLOOD PRESSURE: 80 MMHG

## 2023-09-08 DIAGNOSIS — M54.50 CHRONIC MIDLINE LOW BACK PAIN WITHOUT SCIATICA: Primary | ICD-10-CM

## 2023-09-08 DIAGNOSIS — G89.29 CHRONIC MIDLINE LOW BACK PAIN WITHOUT SCIATICA: Primary | ICD-10-CM

## 2023-09-08 DIAGNOSIS — Z78.0 MENOPAUSE: ICD-10-CM

## 2023-09-08 DIAGNOSIS — E87.1 HYPONATREMIA: ICD-10-CM

## 2023-09-08 RX ORDER — ROSUVASTATIN CALCIUM 5 MG/1
5 TABLET, COATED ORAL DAILY
Qty: 90 TABLET | Refills: 3 | Status: SHIPPED | OUTPATIENT
Start: 2023-09-08

## 2023-09-08 NOTE — PROGRESS NOTES
The ABCs of the Annual Wellness Visit  Subsequent Medicare Wellness Visit    Subjective    Johana Corrigan is a 82 y.o. female who presents for a Subsequent Medicare Wellness Visit.    The following portions of the patient's history were reviewed and   updated as appropriate: allergies, current medications, past family history, past medical history, past social history, past surgical history, and problem list.    Compared to one year ago, the patient feels her physical   health is the same.    Compared to one year ago, the patient feels her mental   health is the same.    Recent Hospitalizations:  She was not admitted to the hospital during the last year.       Current Medical Providers:  Patient Care Team:  Sola Gómez MD as PCP - General    Outpatient Medications Prior to Visit   Medication Sig Dispense Refill    azelastine (ASTELIN) 0.1 % nasal spray 2 sprays into the nostril(s) as directed by provider 2 (Two) Times a Day. Use in each nostril as directed 1 each 12    bacitracin 500 UNIT/GM ointment Apply 1 application topically to the appropriate area as directed Daily. 14 g 0    Calcium-Phosphorus-Vitamin D (CALCIUM/D3 ADULT GUMMIES PO) Take 2 tablets by mouth Every Morning.      hydroCHLOROthiazide (HYDRODIURIL) 12.5 MG tablet TAKE 1 TABLET EVERY DAY 90 tablet 3    losartan (COZAAR) 25 MG tablet TAKE 1 TABLET EVERY DAY 90 tablet 3    montelukast (SINGULAIR) 10 MG tablet TAKE 1 TABLET EVERY NIGHT 90 tablet 3    mupirocin (BACTROBAN) 2 % ointment Apply 1 application topically to the appropriate area as directed 3 (Three) Times a Day. 22 g 1    saccharomyces boulardii (Florastor) 250 MG capsule Take 1 capsule by mouth 2 (Two) Times a Day. 20 capsule 0    Synthroid 50 MCG tablet Take 1 tablet by mouth Daily. 90 tablet 1    rosuvastatin (CRESTOR) 5 MG tablet Take 1 tablet by mouth Daily. 90 tablet 3    cephalexin (Keflex) 500 MG capsule Take 1 capsule by mouth 2 (Two) Times a Day. (Patient not taking: Reported on  "2023) 14 capsule 0    phenazopyridine (Pyridium) 200 MG tablet Take 1 tablet by mouth 3 (Three) Times a Day As Needed for Bladder Spasms. (Patient not taking: Reported on 2023) 15 tablet 0     No facility-administered medications prior to visit.       No opioid medication identified on active medication list. I have reviewed chart for other potential  high risk medication/s and harmful drug interactions in the elderly.        Aspirin is not on active medication list.  Aspirin use is not indicated based on review of current medical condition/s. Risk of harm outweighs potential benefits.  .    Patient Active Problem List   Diagnosis    Atopic rhinitis    Osteoarthritis    Hyperlipidemia    Hypothyroidism    Leukopenia    Depressed mood    Essential hypertension    Mass of right lower extremity    Venous insufficiency     Advance Care Planning   Advance Care Planning     Advance Directive is on file.  ACP discussion was held with the patient during this visit. Patient has an advance directive in EMR which is still valid.      Objective    Vitals:    23 1120   BP: 146/80   Pulse: 62   SpO2: 97%   Weight: 55.8 kg (123 lb)   Height: 154.9 cm (61\")   PainSc: 0-No pain     Estimated body mass index is 23.24 kg/m² as calculated from the following:    Height as of this encounter: 154.9 cm (61\").    Weight as of this encounter: 55.8 kg (123 lb).    BMI is within normal parameters. No other follow-up for BMI required.      Does the patient have evidence of cognitive impairment? No    Lab Results   Component Value Date    CHLPL 250 (H) 2023    TRIG 60 2023    HDL 75 (H) 2023     (H) 2023    VLDL 10 2023        HEALTH RISK ASSESSMENT    Smoking Status:  Social History     Tobacco Use   Smoking Status Former    Packs/day: 0.25    Years: 35.00    Pack years: 8.75    Types: Cigarettes    Quit date:     Years since quittin.7   Smokeless Tobacco Never     Alcohol " Consumption:  Social History     Substance and Sexual Activity   Alcohol Use Yes    Alcohol/week: 7.0 standard drinks    Types: 7 Glasses of wine per week    Comment: caffeine use     Fall Risk Screen:    CAMILO Fall Risk Assessment was completed, and patient is at LOW risk for falls.Assessment completed on:2023    Depression Screenin/8/2023    11:21 AM   PHQ-2/PHQ-9 Depression Screening   Little Interest or Pleasure in Doing Things 0-->not at all   Feeling Down, Depressed or Hopeless 0-->not at all   PHQ-9: Brief Depression Severity Measure Score 0       Health Habits and Functional and Cognitive Screenin/8/2023    11:21 AM   Functional & Cognitive Status   Do you have difficulty preparing food and eating? No   Do you have difficulty bathing yourself, getting dressed or grooming yourself? No   Do you have difficulty using the toilet? No   Do you have difficulty moving around from place to place? No   Do you have trouble with steps or getting out of a bed or a chair? No   Dental Exam Up to date   Eye Exam Up to date   Do you need help using the phone?  No   Are you deaf or do you have serious difficulty hearing?  No   Do you need help to go to places out of walking distance? No   Do you need help shopping? No   Do you need help preparing meals?  No   Do you need help with housework?  No   Do you need help with laundry? No   Do you need help taking your medications? No   Do you need help managing money? No   Do you ever drive or ride in a car without wearing a seat belt? No   Have you felt unusual stress, anger or loneliness in the last month? No   Who do you live with? Spouse   If you need help, do you have trouble finding someone available to you? No   Have you been bothered in the last four weeks by sexual problems? No   Do you have difficulty concentrating, remembering or making decisions? No       Age-appropriate Screening Schedule:  Refer to the list below for future screening  "recommendations based on patient's age, sex and/or medical conditions. Orders for these recommended tests are listed in the plan section. The patient has been provided with a written plan.    Health Maintenance   Topic Date Due    COVID-19 Vaccine (7 - Moderna series) 02/01/2023    DXA SCAN  03/24/2023    INFLUENZA VACCINE  10/01/2023    LIPID PANEL  09/05/2024    ANNUAL WELLNESS VISIT  09/08/2024    COLORECTAL CANCER SCREENING  03/13/2025    TDAP/TD VACCINES (3 - Td or Tdap) 10/07/2029    Pneumococcal Vaccine 65+  Completed    ZOSTER VACCINE  Completed                  CMS Preventative Services Quick Reference  Risk Factors Identified During Encounter  Immunizations Discussed/Encouraged: Influenza, COVID19, and rsv  The above risks/problems have been discussed with the patient.  Pertinent information has been shared with the patient in the After Visit Summary.  An After Visit Summary and PPPS were made available to the patient.    Follow Up:   Next Medicare Wellness visit to be scheduled in 1 year.       Additional E&M Note during same encounter follows:  Patient has multiple medical problems which are significant and separately identifiable that require additional work above and beyond the Medicare Wellness Visit.      Chief Complaint  Annual Exam  AWV  Back pain  Htn  Hypothyroid  Hyperlipidemia  Hyponatremia    Subjective        HPI  Johana Corrigan is also being seen today for back pain. Pain is localized to the low back. No radiation. She notes that she feels this throughout the day.   Has htn. Bp slightly elevated today. Not routinely tested at home. She has dyslipidemia. She is euthyroid on synthroid replacement. Lipids are elevated. Previously well managed on crestor. Labs cbc, cmp. Lipid, tsh, u/a all reviewed w/ patient. Ldl c is now elevated. Sodium is slightly low.              Objective   Vital Signs:  /80   Pulse 62   Ht 154.9 cm (61\")   Wt 55.8 kg (123 lb)   SpO2 97%   BMI 23.24 kg/m² "     Physical Exam  Vitals and nursing note reviewed.   Constitutional:       Appearance: Normal appearance. She is well-developed.   HENT:      Head: Normocephalic and atraumatic.      Right Ear: Tympanic membrane and external ear normal.      Left Ear: Tympanic membrane and external ear normal.      Nose: Nose normal.      Mouth/Throat:      Mouth: Mucous membranes are moist.   Eyes:      Extraocular Movements: Extraocular movements intact.      Pupils: Pupils are equal, round, and reactive to light.   Cardiovascular:      Rate and Rhythm: Normal rate and regular rhythm.      Pulses: Normal pulses.      Heart sounds: Normal heart sounds.   Pulmonary:      Effort: Pulmonary effort is normal. No respiratory distress.      Breath sounds: Normal breath sounds.   Abdominal:      General: Abdomen is flat.      Palpations: Abdomen is soft.   Musculoskeletal:         General: Normal range of motion.      Cervical back: Normal range of motion and neck supple.   Skin:     General: Skin is warm and dry.   Neurological:      General: No focal deficit present.      Mental Status: She is alert and oriented to person, place, and time.   Psychiatric:         Mood and Affect: Mood normal.         Behavior: Behavior normal.         Thought Content: Thought content normal.         Judgment: Judgment normal.        The following data was reviewed by: Sola Gómez MD on 09/08/2023:  CMP          2/21/2023    09:07 6/26/2023    15:41 9/5/2023    08:45   CMP   Glucose 89  84  91    BUN 19  17  17    Creatinine 0.81  0.78  0.74    Sodium 137  135  133    Potassium 3.7  4.2  3.6    Chloride 96  96  95    Calcium 9.7  10.0  9.8    Total Protein 6.1   6.0    Albumin 4.4   4.3    Globulin 1.7   1.7    Total Bilirubin 0.4   0.5    Alkaline Phosphatase 47   58    AST (SGOT) 18   20    ALT (SGPT) 14   15    BUN/Creatinine Ratio 23.5  22  23.0      CBC          2/21/2023    09:07 6/26/2023    15:41 9/5/2023    08:45   CBC   WBC 4.60  6.0  3.83     RBC 4.51  4.64  4.30    Hemoglobin 14.2  14.6  13.6    Hematocrit 41.2  42.3  38.4    MCV 91.4  91  89.3    MCH 31.5  31.5  31.6    MCHC 34.5  34.5  35.4    RDW 11.6  12.0  12.3    Platelets 215  242  199      Lipid Panel          2/21/2023    09:07 9/5/2023    08:45   Lipid Panel   Total Cholesterol 168  250    Triglycerides 38  60    HDL Cholesterol 86  75    VLDL Cholesterol 9  10    LDL Cholesterol  73  165    LDL/HDL Ratio 0.87       TSH          2/21/2023    09:07 6/26/2023    15:41 9/5/2023    08:45   TSH   TSH 0.616  2.140  1.460      UA          2/21/2023    09:07 7/28/2023    14:26 9/5/2023    08:45   Urinalysis   Ketones, UA  Negative     Blood, UA Negative   Negative    Leukocytes, UA Negative  Large (3+)  1+    Nitrite, UA Negative   Negative    RBC, UA 0-2   0-2    Bacteria, UA None seen   None seen                 Assessment and Plan   Diagnoses and all orders for this visit:    1. Chronic midline low back pain without sciatica (Primary)  -     Ambulatory Referral to Physical Therapy Evaluate and treat    2. Hyponatremia  -     Sodium; Future    Other orders  -     rosuvastatin (CRESTOR) 5 MG tablet; Take 1 tablet by mouth Daily.  Dispense: 90 tablet; Refill: 3           I spent 45 minutes caring for Johana on this date of service. This time includes time spent by me in the following activities:preparing for the visit, reviewing tests, obtaining and/or reviewing a separately obtained history, performing a medically appropriate examination and/or evaluation , counseling and educating the patient/family/caregiver, ordering medications, tests, or procedures, referring and communicating with other health care professionals , documenting information in the medical record, and independently interpreting results and communicating that information with the patient/family/caregiver  Follow Up   Return in about 6 months (around 3/8/2024) for Recheck.  Patient was given instructions and counseling regarding  her condition or for health maintenance advice. Please see specific information pulled into the AVS if appropriate.

## 2023-09-09 LAB — SODIUM SERPL-SCNC: 133 MMOL/L (ref 136–145)

## 2023-09-12 ENCOUNTER — TELEPHONE (OUTPATIENT)
Dept: INTERNAL MEDICINE | Facility: CLINIC | Age: 83
End: 2023-09-12
Payer: MEDICARE

## 2023-09-12 NOTE — TELEPHONE ENCOUNTER
----- Message from Sola Gómez MD sent at 9/12/2023 12:43 PM EDT -----  Patient has slightly low sodium level. Reduce hctz to every other day.   rojelio

## 2023-09-13 ENCOUNTER — TELEPHONE (OUTPATIENT)
Dept: INTERNAL MEDICINE | Facility: CLINIC | Age: 83
End: 2023-09-13

## 2023-09-13 NOTE — TELEPHONE ENCOUNTER
Caller: Johana Corrigan    Relationship: Self    Best call back number: [phone]     What is the best time to reach you: ANY     Who are you requesting to speak with (clinical staff, provider,  specific staff member): CLINICAL STAFF     What was the call regarding: PATIENT STATES DR ESCUDERO WANTED HER TO HAVE FLU, COVID AND RSV VACCINES. PATIENT WAS ABLE TO GET FLU, BUT THEY WOULD NOT DO THE RSV VACCINES WITHOUT A WRITTEN ORDER. PATIENT STATES SHE CAN COME PICK THAT UP ON FRIDAY IF AVAILABLE.

## 2023-09-14 ENCOUNTER — TELEPHONE (OUTPATIENT)
Dept: INTERNAL MEDICINE | Facility: CLINIC | Age: 83
End: 2023-09-14
Payer: MEDICARE

## 2023-09-14 NOTE — TELEPHONE ENCOUNTER
Pt will go to [facility] or [facility] to get vaccine  ----- Message from Johana Corrigan sent at 9/14/2023  2:23 PM EDT -----  Regarding: RSV prescripion for [facility] on [address]  Contact: [phone]  Please let me know when it gets there and I will go get the shot.  Thank you.

## 2023-09-19 RX ORDER — LEVOTHYROXINE SODIUM 50 MCG
TABLET ORAL
Qty: 90 TABLET | Refills: 1 | Status: SHIPPED | OUTPATIENT
Start: 2023-09-19

## 2024-01-24 RX ORDER — AMOXICILLIN 500 MG/1
500 CAPSULE ORAL 2 TIMES DAILY
Qty: 14 CAPSULE | Refills: 0 | OUTPATIENT
Start: 2024-01-24

## 2024-02-15 ENCOUNTER — DOCUMENTATION (OUTPATIENT)
Dept: PHYSICAL THERAPY | Facility: HOSPITAL | Age: 84
End: 2024-02-15
Payer: MEDICARE

## 2024-02-15 NOTE — THERAPY DISCHARGE NOTE
Outpatient Physical Therapy Discharge Summary         Patient Name: Johana Corrigan  : 1940  MRN: [id number]    Today's Date: 2/15/2024    Visit Dx:  No diagnosis found.     PT OP Goals       Row Name 02/15/24 1000          PT Short Term Goals    STG Date to Achieve 23  -KD     STG 1 Pt to demonstrate awareness of condition and precautions for improved prevention, management, care of symptoms, and ease of transition to self-care of condition.  -KD     STG 1 Progress Met  -KD     STG 2 Pt to have good understanding of garment wear and care/replacement.  -KD     STG 2 Progress Met  -KD        Long Term Goals    LTG Date to Achieve 23  -KD     LTG 1 None at this time.  -KD               User Key  (r) = Recorded By, (t) = Taken By, (c) = Cosigned By      Initials Name Provider Type    Nazanin Camp, PT Physical Therapist                    OP PT Discharge Summary  Date of Discharge: 02/15/24  Reason for Discharge: All goals achieved  Outcomes Achieved: Able to achieve all goals within established timeline  Discharge Destination: Other (comment) (home with instructions)  Discharge Instructions/Additional Comments: Cont daily compression wear, replace garments every 6 months (see Assessment 23)      Time Calculation:                    Nazanin Allan, SEAN  2/15/2024

## 2024-04-23 RX ORDER — HYDROCHLOROTHIAZIDE 12.5 MG/1
TABLET ORAL
Qty: 90 TABLET | Refills: 3 | Status: SHIPPED | OUTPATIENT
Start: 2024-04-23

## 2024-05-22 RX ORDER — MONTELUKAST SODIUM 10 MG/1
10 TABLET ORAL NIGHTLY
Qty: 90 TABLET | Refills: 3 | Status: SHIPPED | OUTPATIENT
Start: 2024-05-22

## 2024-05-22 RX ORDER — LOSARTAN POTASSIUM 25 MG/1
25 TABLET ORAL DAILY
Qty: 90 TABLET | Refills: 3 | Status: SHIPPED | OUTPATIENT
Start: 2024-05-22

## 2024-05-22 RX ORDER — LEVOTHYROXINE SODIUM 50 MCG
50 TABLET ORAL DAILY
Qty: 90 TABLET | Refills: 1 | Status: SHIPPED | OUTPATIENT
Start: 2024-05-22

## 2024-05-22 NOTE — TELEPHONE ENCOUNTER
Caller: Johana Corrigan    Relationship: Self    Best call back number: [phone]    Requested Prescriptions:   Requested Prescriptions     Pending Prescriptions Disp Refills    Synthroid 50 MCG tablet 90 tablet 1     Sig: Take 1 tablet by mouth Daily.    montelukast (SINGULAIR) 10 MG tablet 90 tablet 3     Si tablet Every Night.    losartan (COZAAR) 25 MG tablet 90 tablet 3     Sig: Take 1 tablet by mouth Daily.        Pharmacy where request should be sent: [facility] - [place] [id number] [facility] - [phone]  - [phone] FX     Last office visit with prescribing clinician: 2023   Last telemedicine visit with prescribing clinician: Visit date not found   Next office visit with prescribing clinician: 10/14/2024     Additional details provided by patient:     Does the patient have less than a 3 day supply:  [] Yes  [x] No    Would you like a call back once the refill request has been completed: [] Yes [x] No    If the office needs to give you a call back, can they leave a voicemail: [] Yes [x] No    Rodrigo Thomas Rep   24 13:42 EDT

## 2024-06-03 ENCOUNTER — OFFICE VISIT (OUTPATIENT)
Dept: INTERNAL MEDICINE | Facility: CLINIC | Age: 84
End: 2024-06-03
Payer: MEDICARE

## 2024-06-03 VITALS
DIASTOLIC BLOOD PRESSURE: 60 MMHG | SYSTOLIC BLOOD PRESSURE: 128 MMHG | BODY MASS INDEX: 22.09 KG/M2 | HEART RATE: 64 BPM | WEIGHT: 117 LBS | OXYGEN SATURATION: 98 % | HEIGHT: 61 IN

## 2024-06-03 DIAGNOSIS — I87.2 VENOUS STASIS DERMATITIS: ICD-10-CM

## 2024-06-03 DIAGNOSIS — M79.89 LEFT LEG SWELLING: Primary | ICD-10-CM

## 2024-06-03 DIAGNOSIS — I87.2 VENOUS INSUFFICIENCY: ICD-10-CM

## 2024-06-03 PROCEDURE — 1159F MED LIST DOCD IN RCRD: CPT | Performed by: INTERNAL MEDICINE

## 2024-06-03 PROCEDURE — 3074F SYST BP LT 130 MM HG: CPT | Performed by: INTERNAL MEDICINE

## 2024-06-03 PROCEDURE — 1126F AMNT PAIN NOTED NONE PRSNT: CPT | Performed by: INTERNAL MEDICINE

## 2024-06-03 PROCEDURE — 99213 OFFICE O/P EST LOW 20 MIN: CPT | Performed by: INTERNAL MEDICINE

## 2024-06-03 PROCEDURE — 3078F DIAST BP <80 MM HG: CPT | Performed by: INTERNAL MEDICINE

## 2024-06-03 PROCEDURE — 1160F RVW MEDS BY RX/DR IN RCRD: CPT | Performed by: INTERNAL MEDICINE

## 2024-06-03 NOTE — PROGRESS NOTES
Subjective     Johana Corrigan is a 83 y.o. female who presents with   Chief Complaint   Patient presents with    Edema       History of Present Illness     Left leg swelling.  Going on a month.  Not swollen in the morning.  No pain.  No injury.   No recent travel.      Review of Systems   Respiratory: Negative.  Negative for chest tightness and shortness of breath.    Cardiovascular:  Positive for leg swelling. Negative for chest pain.       The following portions of the patient's history were reviewed and updated as appropriate: allergies, current medications and problem list.    Patient Active Problem List    Diagnosis Date Noted    Venous insufficiency 05/05/2022    Mass of right lower extremity 10/18/2021    Essential hypertension 10/07/2019    Depressed mood 04/07/2017    Atopic rhinitis 06/29/2016    Osteoarthritis 06/29/2016    Hyperlipidemia 06/29/2016    Hypothyroidism 06/29/2016    Leukopenia 06/29/2016       Current Outpatient Medications on File Prior to Visit   Medication Sig Dispense Refill    azelastine (ASTELIN) 0.1 % nasal spray 2 sprays into the nostril(s) as directed by provider 2 (Two) Times a Day. Use in each nostril as directed 1 each 12    bacitracin 500 UNIT/GM ointment Apply 1 application topically to the appropriate area as directed Daily. 14 g 0    Calcium-Phosphorus-Vitamin D (CALCIUM/D3 ADULT GUMMIES PO) Take 2 tablets by mouth Every Morning.      hydroCHLOROthiazide 12.5 MG tablet TAKE 1 TABLET EVERY DAY 90 tablet 3    losartan (COZAAR) 25 MG tablet Take 1 tablet by mouth Daily. 90 tablet 3    montelukast (SINGULAIR) 10 MG tablet Take 1 tablet by mouth Every Night. 90 tablet 3    mupirocin (BACTROBAN) 2 % ointment Apply 1 application topically to the appropriate area as directed 3 (Three) Times a Day. 22 g 1    rosuvastatin (CRESTOR) 5 MG tablet Take 1 tablet by mouth Daily. 90 tablet 3    saccharomyces boulardii (Florastor) 250 MG capsule Take 1 capsule by mouth 2 (Two) Times a Day. 20  "capsule 0    Synthroid 50 MCG tablet Take 1 tablet by mouth Daily. 90 tablet 1     No current facility-administered medications on file prior to visit.       Objective     /60   Pulse 64   Ht 154.9 cm (60.98\")   Wt 53.1 kg (117 lb)   SpO2 98%   BMI 22.12 kg/m²     Physical Exam  Constitutional:       Appearance: She is well-developed.   HENT:      Head: Normocephalic and atraumatic.   Cardiovascular:      Rate and Rhythm: Normal rate and regular rhythm.      Heart sounds: Normal heart sounds.      Comments: Changes of venous stasis with hyperpigmentation of skin.    Pulmonary:      Effort: Pulmonary effort is normal.      Breath sounds: Normal breath sounds.   Musculoskeletal:      Right lower leg: No edema.      Left lower le+   Skin:     General: Skin is warm and dry.   Neurological:      Mental Status: She is alert and oriented to person, place, and time.   Psychiatric:         Behavior: Behavior normal.         Assessment & Plan   Diagnoses and all orders for this visit:    1. Left leg swelling (Primary)  -     Duplex Venous Lower Extremity - Left CAR; Future  -     CBC & Differential  -     Comprehensive Metabolic Panel  -     Urinalysis With Microscopic - Urine, Clean Catch        Discussion    Patient presents with two months of LLE swelling like secondary to venous insufficiency.  Venous doppler is ordered to rule out DVT.  Check additional labs.  Further plans pending results.         Future Appointments   Date Time Provider Department Center   10/14/2024  8:45 AM Sola Gómez MD [facility]         "

## 2024-06-04 ENCOUNTER — HOSPITAL ENCOUNTER (OUTPATIENT)
Dept: CARDIOLOGY | Facility: HOSPITAL | Age: 84
Discharge: HOME | End: 2024-06-04
Admitting: INTERNAL MEDICINE
Payer: MEDICARE

## 2024-06-04 DIAGNOSIS — M79.89 LEFT LEG SWELLING: ICD-10-CM

## 2024-06-04 LAB
ALBUMIN SERPL-MCNC: 4.5 G/DL (ref 3.5–5.2)
ALBUMIN/GLOB SERPL: 2.6 G/DL
ALP SERPL-CCNC: 53 U/L (ref 39–117)
ALT SERPL-CCNC: 37 U/L (ref 1–33)
APPEARANCE UR: CLEAR
AST SERPL-CCNC: 42 U/L (ref 1–32)
BACTERIA #/AREA URNS HPF: NORMAL /HPF
BASOPHILS # BLD AUTO: 0.06 10*3/MM3 (ref 0–0.2)
BASOPHILS NFR BLD AUTO: 1.1 % (ref 0–1.5)
BH CV LOWER VASCULAR LEFT COMMON FEMORAL AUGMENT: NORMAL
BH CV LOWER VASCULAR LEFT COMMON FEMORAL COMPETENT: NORMAL
BH CV LOWER VASCULAR LEFT COMMON FEMORAL COMPRESS: NORMAL
BH CV LOWER VASCULAR LEFT COMMON FEMORAL PHASIC: NORMAL
BH CV LOWER VASCULAR LEFT COMMON FEMORAL SPONT: NORMAL
BH CV LOWER VASCULAR LEFT DISTAL FEMORAL COMPRESS: NORMAL
BH CV LOWER VASCULAR LEFT GASTRONEMIUS COMPRESS: NORMAL
BH CV LOWER VASCULAR LEFT GREATER SAPH AK COMPRESS: NORMAL
BH CV LOWER VASCULAR LEFT GREATER SAPH BK COMPRESS: NORMAL
BH CV LOWER VASCULAR LEFT LESSER SAPH COMPRESS: NORMAL
BH CV LOWER VASCULAR LEFT MID FEMORAL AUGMENT: NORMAL
BH CV LOWER VASCULAR LEFT MID FEMORAL COMPETENT: NORMAL
BH CV LOWER VASCULAR LEFT MID FEMORAL COMPRESS: NORMAL
BH CV LOWER VASCULAR LEFT MID FEMORAL PHASIC: NORMAL
BH CV LOWER VASCULAR LEFT MID FEMORAL SPONT: NORMAL
BH CV LOWER VASCULAR LEFT PERONEAL COMPRESS: NORMAL
BH CV LOWER VASCULAR LEFT POPLITEAL AUGMENT: NORMAL
BH CV LOWER VASCULAR LEFT POPLITEAL COMPETENT: NORMAL
BH CV LOWER VASCULAR LEFT POPLITEAL COMPRESS: NORMAL
BH CV LOWER VASCULAR LEFT POPLITEAL PHASIC: NORMAL
BH CV LOWER VASCULAR LEFT POPLITEAL SPONT: NORMAL
BH CV LOWER VASCULAR LEFT POSTERIOR TIBIAL COMPRESS: NORMAL
BH CV LOWER VASCULAR LEFT PROFUNDA FEMORAL COMPRESS: NORMAL
BH CV LOWER VASCULAR LEFT PROXIMAL FEMORAL COMPRESS: NORMAL
BH CV LOWER VASCULAR LEFT SAPHENOFEMORAL JUNCTION COMPRESS: NORMAL
BH CV LOWER VASCULAR RIGHT COMMON FEMORAL AUGMENT: NORMAL
BH CV LOWER VASCULAR RIGHT COMMON FEMORAL COMPETENT: NORMAL
BH CV LOWER VASCULAR RIGHT COMMON FEMORAL COMPRESS: NORMAL
BH CV LOWER VASCULAR RIGHT COMMON FEMORAL PHASIC: NORMAL
BH CV LOWER VASCULAR RIGHT COMMON FEMORAL SPONT: NORMAL
BH CV VAS PRELIMINARY FINDINGS SCRIPTING: 1
BILIRUB SERPL-MCNC: 0.4 MG/DL (ref 0–1.2)
BILIRUB UR QL STRIP: NEGATIVE
BUN SERPL-MCNC: 18 MG/DL (ref 8–23)
BUN/CREAT SERPL: 25 (ref 7–25)
CALCIUM SERPL-MCNC: 10.1 MG/DL (ref 8.6–10.5)
CASTS URNS MICRO: NORMAL
CHLORIDE SERPL-SCNC: 102 MMOL/L (ref 98–107)
CO2 SERPL-SCNC: 24.6 MMOL/L (ref 22–29)
COLOR UR: YELLOW
CREAT SERPL-MCNC: 0.72 MG/DL (ref 0.57–1)
EGFRCR SERPLBLD CKD-EPI 2021: 83.1 ML/MIN/1.73
EOSINOPHIL # BLD AUTO: 0.08 10*3/MM3 (ref 0–0.4)
EOSINOPHIL NFR BLD AUTO: 1.4 % (ref 0.3–6.2)
EPI CELLS #/AREA URNS HPF: NORMAL /HPF
ERYTHROCYTE [DISTWIDTH] IN BLOOD BY AUTOMATED COUNT: 12.6 % (ref 12.3–15.4)
GLOBULIN SER CALC-MCNC: 1.7 GM/DL
GLUCOSE SERPL-MCNC: 83 MG/DL (ref 65–99)
GLUCOSE UR QL STRIP: NEGATIVE
HCT VFR BLD AUTO: 42.3 % (ref 34–46.6)
HGB BLD-MCNC: 14 G/DL (ref 12–15.9)
HGB UR QL STRIP: NEGATIVE
IMM GRANULOCYTES # BLD AUTO: 0.01 10*3/MM3 (ref 0–0.05)
IMM GRANULOCYTES NFR BLD AUTO: 0.2 % (ref 0–0.5)
KETONES UR QL STRIP: NEGATIVE
LEUKOCYTE ESTERASE UR QL STRIP: NEGATIVE
LYMPHOCYTES # BLD AUTO: 1.37 10*3/MM3 (ref 0.7–3.1)
LYMPHOCYTES NFR BLD AUTO: 24 % (ref 19.6–45.3)
MCH RBC QN AUTO: 30.7 PG (ref 26.6–33)
MCHC RBC AUTO-ENTMCNC: 33.1 G/DL (ref 31.5–35.7)
MCV RBC AUTO: 92.8 FL (ref 79–97)
MONOCYTES # BLD AUTO: 0.38 10*3/MM3 (ref 0.1–0.9)
MONOCYTES NFR BLD AUTO: 6.7 % (ref 5–12)
NEUTROPHILS # BLD AUTO: 3.8 10*3/MM3 (ref 1.7–7)
NEUTROPHILS NFR BLD AUTO: 66.6 % (ref 42.7–76)
NITRITE UR QL STRIP: NEGATIVE
NRBC BLD AUTO-RTO: 0 /100 WBC (ref 0–0.2)
PH UR STRIP: 7.5 [PH] (ref 5–8)
PLATELET # BLD AUTO: 243 10*3/MM3 (ref 140–450)
POTASSIUM SERPL-SCNC: 3.9 MMOL/L (ref 3.5–5.2)
PROT SERPL-MCNC: 6.2 G/DL (ref 6–8.5)
PROT UR QL STRIP: NEGATIVE
RBC # BLD AUTO: 4.56 10*6/MM3 (ref 3.77–5.28)
RBC #/AREA URNS HPF: NORMAL /HPF
SODIUM SERPL-SCNC: 137 MMOL/L (ref 136–145)
SP GR UR STRIP: 1.01 (ref 1–1.03)
UROBILINOGEN UR STRIP-MCNC: NORMAL MG/DL
WBC # BLD AUTO: 5.7 10*3/MM3 (ref 3.4–10.8)
WBC #/AREA URNS HPF: NORMAL /HPF

## 2024-06-04 PROCEDURE — 93971 EXTREMITY STUDY: CPT | Performed by: SURGERY

## 2024-06-04 PROCEDURE — 93971 EXTREMITY STUDY: CPT

## 2024-06-16 DIAGNOSIS — R74.8 ABNORMAL LIVER ENZYMES: Primary | ICD-10-CM

## 2024-06-17 DIAGNOSIS — R74.8 ACID PHOSPHATASE ELEVATED: Primary | ICD-10-CM

## 2024-07-10 ENCOUNTER — OFFICE VISIT (OUTPATIENT)
Dept: INTERNAL MEDICINE | Facility: CLINIC | Age: 84
End: 2024-07-10
Payer: MEDICARE

## 2024-07-10 VITALS
DIASTOLIC BLOOD PRESSURE: 74 MMHG | OXYGEN SATURATION: 97 % | WEIGHT: 116.4 LBS | BODY MASS INDEX: 21.98 KG/M2 | SYSTOLIC BLOOD PRESSURE: 134 MMHG | HEART RATE: 63 BPM | HEIGHT: 61 IN

## 2024-07-10 DIAGNOSIS — R74.8 ABNORMAL LIVER ENZYMES: ICD-10-CM

## 2024-07-10 DIAGNOSIS — R53.83 OTHER FATIGUE: Primary | ICD-10-CM

## 2024-07-10 DIAGNOSIS — E03.9 HYPOTHYROIDISM, UNSPECIFIED TYPE: ICD-10-CM

## 2024-07-10 LAB
ALBUMIN SERPL-MCNC: 4.4 G/DL (ref 3.5–5.2)
ALBUMIN/GLOB SERPL: 2.4 G/DL
ALP SERPL-CCNC: 56 U/L (ref 39–117)
ALT SERPL-CCNC: 18 U/L (ref 1–33)
AST SERPL-CCNC: 26 U/L (ref 1–32)
BASOPHILS # BLD AUTO: 0.05 10*3/MM3 (ref 0–0.2)
BASOPHILS NFR BLD AUTO: 1.1 % (ref 0–1.5)
BILIRUB SERPL-MCNC: 0.4 MG/DL (ref 0–1.2)
BUN SERPL-MCNC: 13 MG/DL (ref 8–23)
BUN/CREAT SERPL: 15.7 (ref 7–25)
CALCIUM SERPL-MCNC: 10.6 MG/DL (ref 8.6–10.5)
CHLORIDE SERPL-SCNC: 96 MMOL/L (ref 98–107)
CO2 SERPL-SCNC: 24.4 MMOL/L (ref 22–29)
CREAT SERPL-MCNC: 0.83 MG/DL (ref 0.57–1)
EGFRCR SERPLBLD CKD-EPI 2021: 70 ML/MIN/1.73
EOSINOPHIL # BLD AUTO: 0.07 10*3/MM3 (ref 0–0.4)
EOSINOPHIL NFR BLD AUTO: 1.6 % (ref 0.3–6.2)
ERYTHROCYTE [DISTWIDTH] IN BLOOD BY AUTOMATED COUNT: 12.4 % (ref 12.3–15.4)
GLOBULIN SER CALC-MCNC: 1.8 GM/DL
GLUCOSE SERPL-MCNC: 89 MG/DL (ref 65–99)
HCT VFR BLD AUTO: 41.8 % (ref 34–46.6)
HGB BLD-MCNC: 14.2 G/DL (ref 12–15.9)
IMM GRANULOCYTES # BLD AUTO: 0.01 10*3/MM3 (ref 0–0.05)
IMM GRANULOCYTES NFR BLD AUTO: 0.2 % (ref 0–0.5)
LYMPHOCYTES # BLD AUTO: 1.41 10*3/MM3 (ref 0.7–3.1)
LYMPHOCYTES NFR BLD AUTO: 31.8 % (ref 19.6–45.3)
MCH RBC QN AUTO: 31.2 PG (ref 26.6–33)
MCHC RBC AUTO-ENTMCNC: 34 G/DL (ref 31.5–35.7)
MCV RBC AUTO: 91.9 FL (ref 79–97)
MONOCYTES # BLD AUTO: 0.37 10*3/MM3 (ref 0.1–0.9)
MONOCYTES NFR BLD AUTO: 8.4 % (ref 5–12)
NEUTROPHILS # BLD AUTO: 2.52 10*3/MM3 (ref 1.7–7)
NEUTROPHILS NFR BLD AUTO: 56.9 % (ref 42.7–76)
NRBC BLD AUTO-RTO: 0 /100 WBC (ref 0–0.2)
PLATELET # BLD AUTO: 211 10*3/MM3 (ref 140–450)
POTASSIUM SERPL-SCNC: 3.7 MMOL/L (ref 3.5–5.2)
PROT SERPL-MCNC: 6.2 G/DL (ref 6–8.5)
RBC # BLD AUTO: 4.55 10*6/MM3 (ref 3.77–5.28)
SODIUM SERPL-SCNC: 135 MMOL/L (ref 136–145)
TSH SERPL DL<=0.005 MIU/L-ACNC: 3.36 UIU/ML (ref 0.27–4.2)
WBC # BLD AUTO: 4.43 10*3/MM3 (ref 3.4–10.8)

## 2024-07-10 PROCEDURE — 3078F DIAST BP <80 MM HG: CPT | Performed by: STUDENT IN AN ORGANIZED HEALTH CARE EDUCATION/TRAINING PROGRAM

## 2024-07-10 PROCEDURE — 99213 OFFICE O/P EST LOW 20 MIN: CPT | Performed by: STUDENT IN AN ORGANIZED HEALTH CARE EDUCATION/TRAINING PROGRAM

## 2024-07-10 PROCEDURE — 3075F SYST BP GE 130 - 139MM HG: CPT | Performed by: STUDENT IN AN ORGANIZED HEALTH CARE EDUCATION/TRAINING PROGRAM

## 2024-07-10 PROCEDURE — 1126F AMNT PAIN NOTED NONE PRSNT: CPT | Performed by: STUDENT IN AN ORGANIZED HEALTH CARE EDUCATION/TRAINING PROGRAM

## 2024-07-10 NOTE — PROGRESS NOTES
"Answers submitted by the patient for this visit:  Other (Submitted on 7/8/2024)  Please describe your symptoms.: very tired  Have you had these symptoms before?: No  How long have you been having these symptoms?: Greater than 2 weeks  Please list any medications you are currently taking for this condition.: all of them are in the chart at the practice.  I have not changed and will bring a list, just in case  Please describe any probable cause for these symptoms. : I  haven't changed my lifestyle recently so I don't know why., I made a great effort and lost some weight to be a little healthier.  Primary Reason for Visit (Submitted on 7/8/2024)  What is the primary reason for your visit?: Other    Vance Mitchell M.D.  Internal Medicine  [address], [address]  [place] [address]  [phone]      Chief Complaint  Fatigue (Tired all the time. /)    SUBJECTIVE    History of Present Illness    Johana Corrigan is a 83 y.o. female with hypertension, hyperlipidemia, hypothyroidism, leukopenia, depression, osteoarthritis who presents to the office today as an established patient of Dr Sola Gómez MD here today for an acute care visit.     Fatigue for 1 month.    She was seen last month by Dr. Batres for left leg swelling.  Ultrasound was negative for DVT.    She had blood work last month that showed mildly elevated liver enzymes.  CBC was normal. She is wondering if liver enzymes are related to fatigue.     She is very busy. Waking up tired. Not going to gym recently due to time constraints. States she is normally an \"engizer bunny\". Lately having more trouble doing everyuthing she wants.  Has to stop at times. She runs a nonprofit. She goes to sleep easily. Sleeping 6 hours nightly. She is an \"early bird\". She is somewhat a worrier.     Denies chest pain/shortness of breath.     Lost 10 lbs intentionally for back pain.     Denies depression.   Hypothyroidism- change in energy level and " heat / cold intolerance . Patient denies  constipation .       She sleeps well. She had roofers early in the AM at her condo but they are done. Goes to bed at 11 am and wakes up at 6:30 am. Taking hemp gummies because she was waking up at middle of the night to use the restroom.  Hemp gummies help.     Does not drink water.     No fevers/night sweats, joint pains, GI changes, not lightheaded/dizzy.     Going on vacation soon for 1.5 weeks.     Review of Systems    No Known Allergies     Outpatient Medications Marked as Taking for the 7/10/24 encounter (Office Visit) with Vance Mitchell MD   Medication Sig Dispense Refill    azelastine (ASTELIN) 0.1 % nasal spray 2 sprays into the nostril(s) as directed by provider 2 (Two) Times a Day. Use in each nostril as directed 1 each 12    bacitracin 500 UNIT/GM ointment Apply 1 application topically to the appropriate area as directed Daily. 14 g 0    Calcium-Phosphorus-Vitamin D (CALCIUM/D3 ADULT GUMMIES PO) Take 2 tablets by mouth Every Morning.      hydroCHLOROthiazide 12.5 MG tablet TAKE 1 TABLET EVERY DAY 90 tablet 3    losartan (COZAAR) 25 MG tablet Take 1 tablet by mouth Daily. 90 tablet 3    montelukast (SINGULAIR) 10 MG tablet Take 1 tablet by mouth Every Night. 90 tablet 3    mupirocin (BACTROBAN) 2 % ointment Apply 1 application topically to the appropriate area as directed 3 (Three) Times a Day. 22 g 1    rosuvastatin (CRESTOR) 5 MG tablet Take 1 tablet by mouth Daily. 90 tablet 3    saccharomyces boulardii (Florastor) 250 MG capsule Take 1 capsule by mouth 2 (Two) Times a Day. 20 capsule 0    Synthroid 50 MCG tablet Take 1 tablet by mouth Daily. 90 tablet 1        Past Medical History:   Diagnosis Date    Allergic     Arthritis     Cataract     Dyspnea     Headache     History of anemia     Hyperlipidemia     Hypertension     Hyperthyroidism     Leukopenia     Osteoarthritis      Past Surgical History:   Procedure Laterality Date    BRONCHOSCOPY N/A 12/13/2018     "Procedure: BRONCHOSCOPY WITH;  Surgeon: Gee Kenney MD;  Location: [facility];  Service: Pulmonary    CATARACT EXTRACTION Bilateral     COLONOSCOPY      EXCISION MASS LEG Right 11/5/2021    Procedure: LOWER EXTREMITY EXCISION LESION/CYST;  Surgeon: Anatoly Jones MD;  Location: [facility];  Service: General;  Laterality: Right;    FOOT SURGERY Left     bone spur    KNEE ASPIRATION Right 09/22/2021    rt knee mass    NOSE SURGERY      SUBMANDIBULAR GLAND EXCISION      TONSILLECTOMY      TUBAL ABDOMINAL LIGATION       Family History   Problem Relation Age of Onset    Heart disease Father     Heart disease Brother     Heart disease Mother     Malig Hyperthermia Neg Hx     Breast cancer Neg Hx     Ovarian cancer Neg Hx     reports that she quit smoking about 34 years ago. Her smoking use included cigarettes. She started smoking about 69 years ago. She has a 8.8 pack-year smoking history. She has never used smokeless tobacco. She reports current alcohol use of about 7.0 standard drinks of alcohol per week. She reports that she does not use drugs.    OBJECTIVE    Vital Signs:   /74   Pulse 63   Ht 154.9 cm (60.98\")   Wt 52.8 kg (116 lb 6.4 oz)   SpO2 97%   BMI 22.01 kg/m²     Physical Exam  Constitutional:       Appearance: Normal appearance. She is normal weight.   Cardiovascular:      Rate and Rhythm: Normal rate and regular rhythm.      Heart sounds: Normal heart sounds. No murmur heard.  Pulmonary:      Effort: Pulmonary effort is normal.      Breath sounds: Normal breath sounds.   Abdominal:      General: Abdomen is flat. There is no distension.      Palpations: Abdomen is soft.      Tenderness: There is no abdominal tenderness.   Musculoskeletal:      Right lower leg: No edema.      Left lower leg: No edema.      Comments: Chronic venous stasis changes   Skin:     General: Skin is warm and dry.   Neurological:      Mental Status: She is alert.   Psychiatric:         Mood and Affect: Mood " normal.         Behavior: Behavior normal.         Thought Content: Thought content normal.            The following data was reviewed by: Vance Mitchell MD on 07/10/2024:  CMP          9/5/2023    08:45 9/8/2023    12:16 6/3/2024    09:37   CMP   Glucose 91   83    BUN 17   18    Creatinine 0.74   0.72    Sodium 133  133  137    Potassium 3.6   3.9    Chloride 95   102    Calcium 9.8   10.1    Total Protein 6.0   6.2    Albumin 4.3   4.5    Globulin 1.7   1.7    Total Bilirubin 0.5   0.4    Alkaline Phosphatase 58   53    AST (SGOT) 20   42    ALT (SGPT) 15   37    BUN/Creatinine Ratio 23.0   25.0      CBC w/diff          9/5/2023    08:45 6/3/2024    09:37   CBC w/Diff   WBC 3.83  5.70    RBC 4.30  4.56    Hemoglobin 13.6  14.0    Hematocrit 38.4  42.3    MCV 89.3  92.8    MCH 31.6  30.7    MCHC 35.4  33.1    RDW 12.3  12.6    Platelets 199  243    Neutrophil Rel % 50.2  66.6    Lymphocyte Rel % 36.8  24.0    Monocyte Rel % 9.9  6.7    Eosinophil Rel % 1.8  1.4    Basophil Rel % 1.0  1.1      Lipid Panel          9/5/2023    08:45   Lipid Panel   Total Cholesterol 250    Triglycerides 60    HDL Cholesterol 75    VLDL Cholesterol 10    LDL Cholesterol  165      TSH          9/5/2023    08:45   TSH   TSH 1.460        Data reviewed : recent acute visit notes              ASSESSMENT & PLAN     Diagnoses and all orders for this visit:    1. Other fatigue (Primary)  -     Comprehensive Metabolic Panel  -     CBC & Differential  -     TSH Rfx On Abnormal To Free T4    2. Abnormal liver enzymes  -     Comprehensive Metabolic Panel    3. Hypothyroidism, unspecified type  -     TSH Rfx On Abnormal To Free T4      Suspect main issue is stress and decreased sleep.   Restrict water near bedtime  Increase sleep to 7-8 hours  Manage stress  Try to relax on vacation.   Rechecking liver enzymes, thyroid and CBC  Reassured liver enzymes are only mildly elevated          Health Maintenance Due   Topic Date Due    DXA SCAN  03/24/2023     ANNUAL WELLNESS VISIT  09/08/2024        Follow Up  No follow-ups on file.    Patient/family had no further questions at this time and verbalized understanding of the plan discussed today.

## 2024-09-17 ENCOUNTER — TELEPHONE (OUTPATIENT)
Dept: NEUROSURGERY | Facility: CLINIC | Age: 84
End: 2024-09-17

## 2024-09-17 ENCOUNTER — OFFICE VISIT (OUTPATIENT)
Dept: INTERNAL MEDICINE | Facility: CLINIC | Age: 84
End: 2024-09-17
Payer: MEDICARE

## 2024-09-17 VITALS
BODY MASS INDEX: 21.14 KG/M2 | DIASTOLIC BLOOD PRESSURE: 74 MMHG | HEART RATE: 78 BPM | WEIGHT: 112 LBS | OXYGEN SATURATION: 98 % | HEIGHT: 61 IN | SYSTOLIC BLOOD PRESSURE: 130 MMHG

## 2024-09-17 DIAGNOSIS — E55.9 VITAMIN D DEFICIENCY: ICD-10-CM

## 2024-09-17 DIAGNOSIS — S32.010A COMPRESSION FRACTURE OF L1 VERTEBRA, INITIAL ENCOUNTER: ICD-10-CM

## 2024-09-17 DIAGNOSIS — M80.00XA AGE-RELATED OSTEOPOROSIS WITH CURRENT PATHOLOGICAL FRACTURE, INITIAL ENCOUNTER: Primary | ICD-10-CM

## 2024-09-17 DIAGNOSIS — S81.801A WOUND OF RIGHT LOWER EXTREMITY, INITIAL ENCOUNTER: ICD-10-CM

## 2024-09-17 PROCEDURE — 1126F AMNT PAIN NOTED NONE PRSNT: CPT | Performed by: INTERNAL MEDICINE

## 2024-09-17 PROCEDURE — 3078F DIAST BP <80 MM HG: CPT | Performed by: INTERNAL MEDICINE

## 2024-09-17 PROCEDURE — 99214 OFFICE O/P EST MOD 30 MIN: CPT | Performed by: INTERNAL MEDICINE

## 2024-09-17 PROCEDURE — 3075F SYST BP GE 130 - 139MM HG: CPT | Performed by: INTERNAL MEDICINE

## 2024-09-17 RX ORDER — MUPIROCIN 20 MG/G
1 OINTMENT TOPICAL 3 TIMES DAILY
Qty: 30 G | Refills: 3 | Status: SHIPPED | OUTPATIENT
Start: 2024-09-17

## 2024-09-17 RX ORDER — SACCHAROMYCES BOULARDII 250 MG
250 CAPSULE ORAL 2 TIMES DAILY
Qty: 20 CAPSULE | Refills: 0 | Status: SHIPPED | OUTPATIENT
Start: 2024-09-17

## 2024-09-17 RX ORDER — CEPHALEXIN 500 MG/1
500 CAPSULE ORAL 2 TIMES DAILY
Qty: 14 CAPSULE | Refills: 0 | Status: SHIPPED | OUTPATIENT
Start: 2024-09-17

## 2024-09-18 DIAGNOSIS — R79.89 LOW SERUM PARATHYROID HORMONE (PTH): Primary | ICD-10-CM

## 2024-09-18 LAB
25(OH)D3+25(OH)D2 SERPL-MCNC: 99.6 NG/ML (ref 30–100)
BUN SERPL-MCNC: 37 MG/DL (ref 8–27)
BUN/CREAT SERPL: 35 (ref 12–28)
CALCIUM SERPL-MCNC: 10.6 MG/DL (ref 8.7–10.3)
CHLORIDE SERPL-SCNC: 96 MMOL/L (ref 96–106)
CO2 SERPL-SCNC: 21 MMOL/L (ref 20–29)
CREAT SERPL-MCNC: 1.06 MG/DL (ref 0.57–1)
EGFRCR SERPLBLD CKD-EPI 2021: 52 ML/MIN/1.73
GLUCOSE SERPL-MCNC: 92 MG/DL (ref 70–99)
POTASSIUM SERPL-SCNC: 3.5 MMOL/L (ref 3.5–5.2)
PTH-INTACT SERPL-MCNC: 9 PG/ML (ref 15–65)
SODIUM SERPL-SCNC: 134 MMOL/L (ref 134–144)

## 2024-09-20 ENCOUNTER — TELEPHONE (OUTPATIENT)
Dept: INTERNAL MEDICINE | Facility: CLINIC | Age: 84
End: 2024-09-20
Payer: MEDICARE

## 2024-09-20 ENCOUNTER — OFFICE VISIT (OUTPATIENT)
Dept: NEUROSURGERY | Facility: CLINIC | Age: 84
End: 2024-09-20
Payer: MEDICARE

## 2024-09-20 DIAGNOSIS — G89.29 CHRONIC BILATERAL LOW BACK PAIN WITHOUT SCIATICA: Primary | ICD-10-CM

## 2024-09-20 DIAGNOSIS — M54.50 CHRONIC BILATERAL LOW BACK PAIN WITHOUT SCIATICA: Primary | ICD-10-CM

## 2024-09-20 LAB
MAGNESIUM SERPL-MCNC: 2 MG/DL (ref 1.6–2.3)
WRITTEN AUTHORIZATION: NORMAL

## 2024-09-20 PROCEDURE — 99204 OFFICE O/P NEW MOD 45 MIN: CPT | Performed by: NEUROLOGICAL SURGERY

## 2024-09-20 PROCEDURE — 1160F RVW MEDS BY RX/DR IN RCRD: CPT | Performed by: NEUROLOGICAL SURGERY

## 2024-09-20 PROCEDURE — 1159F MED LIST DOCD IN RCRD: CPT | Performed by: NEUROLOGICAL SURGERY

## 2024-09-23 ENCOUNTER — TELEPHONE (OUTPATIENT)
Dept: NEUROSURGERY | Facility: CLINIC | Age: 84
End: 2024-09-23
Payer: MEDICARE

## 2024-09-25 ENCOUNTER — OFFICE VISIT (OUTPATIENT)
Dept: WOUND CARE | Facility: HOSPITAL | Age: 84
End: 2024-09-25
Payer: MEDICARE

## 2024-10-01 NOTE — PROGRESS NOTES
"Subjective   Patient ID: Johana Corrigan is a 83 y.o. female is here today for follow-up with a new MRI Thoracic and Lumbar done on 9/24/24 at [facility]    Today patient states when she is standing for long periods of time is when the back pain starts .    History of Present Illness    This patient returns today.  She continues to have pain on and off particularly when she is standing for a long time.  There is no radiation into her legs.    The following portions of the patient's history were reviewed and updated as appropriate: allergies, current medications, past family history, past medical history, past social history, past surgical history, and problem list.    Review of Systems   Constitutional:  Negative for chills and fever.   HENT:  Negative for congestion.    Genitourinary:  Negative for difficulty urinating.   Musculoskeletal:  Positive for back pain. Negative for myalgias, neck pain and neck stiffness.   Neurological:  Negative for dizziness, weakness, light-headedness, numbness and headaches.       I reviewed the review of systems listed by the patient and discussed by my MA    Objective     Vitals:    10/03/24 1159   BP: 112/64   Pulse: (!) 45   Temp: 96.8 °F (36 °C)   SpO2: 100%   Weight: 50.8 kg (112 lb)   Height: 154.9 cm (61\")     Body mass index is 21.16 kg/m².      Physical Exam  Neurological:      Mental Status: She is alert and oriented to person, place, and time.       Neurological Exam  Mental Status  Alert. Oriented to person, place, and time.          Assessment & Plan   Independent Review of Radiographic Studies:      I personally reviewed the images from the following studies.    I reviewed her thoracic and lumbar MRIs done on 24 September of this year.  The thoracic MRI does not show much compression of the neural elements at any level.  Neither does the lumbar MRI.  There is a healed compression fracture of L1 and some Tarlov cyst but otherwise there is really nothing terribly " abnormal on the MRIs.    Medical Decision Making:      Told the patient about the imaging.  I told her that from my point of view there is nothing here I would recommend surgery for.  She is fine with that as she really did not want surgery anyway.  I did suggest that we try some physical therapy.  She is in agreement with that plan.    Diagnoses and all orders for this visit:    1. Chronic bilateral low back pain without sciatica (Primary)  -     Ambulatory Referral to Physical Therapy for Evaluation & Treatment      Return if symptoms worsen or fail to improve.

## 2024-10-02 ENCOUNTER — OFFICE VISIT (OUTPATIENT)
Dept: WOUND CARE | Facility: HOSPITAL | Age: 84
End: 2024-10-02
Payer: MEDICARE

## 2024-10-03 ENCOUNTER — OFFICE VISIT (OUTPATIENT)
Dept: NEUROSURGERY | Facility: CLINIC | Age: 84
End: 2024-10-03
Payer: MEDICARE

## 2024-10-03 VITALS
HEIGHT: 61 IN | DIASTOLIC BLOOD PRESSURE: 64 MMHG | HEART RATE: 45 BPM | TEMPERATURE: 96.8 F | OXYGEN SATURATION: 100 % | SYSTOLIC BLOOD PRESSURE: 112 MMHG | WEIGHT: 112 LBS | BODY MASS INDEX: 21.14 KG/M2

## 2024-10-03 DIAGNOSIS — G89.29 CHRONIC BILATERAL LOW BACK PAIN WITHOUT SCIATICA: Primary | ICD-10-CM

## 2024-10-03 DIAGNOSIS — M54.50 CHRONIC BILATERAL LOW BACK PAIN WITHOUT SCIATICA: Primary | ICD-10-CM

## 2024-10-03 PROCEDURE — 3074F SYST BP LT 130 MM HG: CPT | Performed by: NEUROLOGICAL SURGERY

## 2024-10-03 PROCEDURE — 99213 OFFICE O/P EST LOW 20 MIN: CPT | Performed by: NEUROLOGICAL SURGERY

## 2024-10-03 PROCEDURE — 1160F RVW MEDS BY RX/DR IN RCRD: CPT | Performed by: NEUROLOGICAL SURGERY

## 2024-10-03 PROCEDURE — 3078F DIAST BP <80 MM HG: CPT | Performed by: NEUROLOGICAL SURGERY

## 2024-10-03 PROCEDURE — 1159F MED LIST DOCD IN RCRD: CPT | Performed by: NEUROLOGICAL SURGERY

## 2024-10-14 ENCOUNTER — OFFICE VISIT (OUTPATIENT)
Dept: INTERNAL MEDICINE | Facility: CLINIC | Age: 84
End: 2024-10-14
Payer: MEDICARE

## 2024-10-14 VITALS
DIASTOLIC BLOOD PRESSURE: 78 MMHG | WEIGHT: 115 LBS | OXYGEN SATURATION: 98 % | BODY MASS INDEX: 21.71 KG/M2 | HEIGHT: 61 IN | HEART RATE: 66 BPM | SYSTOLIC BLOOD PRESSURE: 160 MMHG

## 2024-10-14 DIAGNOSIS — I10 HYPERTENSION, UNSPECIFIED TYPE: ICD-10-CM

## 2024-10-14 DIAGNOSIS — S80.811A ABRASION, RIGHT LOWER LEG, INITIAL ENCOUNTER: ICD-10-CM

## 2024-10-14 DIAGNOSIS — J30.2 SEASONAL ALLERGIC RHINITIS, UNSPECIFIED TRIGGER: ICD-10-CM

## 2024-10-14 DIAGNOSIS — S81.801A WOUND OF RIGHT LOWER EXTREMITY, INITIAL ENCOUNTER: Primary | ICD-10-CM

## 2024-10-14 PROCEDURE — 3078F DIAST BP <80 MM HG: CPT | Performed by: INTERNAL MEDICINE

## 2024-10-14 PROCEDURE — 1159F MED LIST DOCD IN RCRD: CPT | Performed by: INTERNAL MEDICINE

## 2024-10-14 PROCEDURE — 90471 IMMUNIZATION ADMIN: CPT | Performed by: INTERNAL MEDICINE

## 2024-10-14 PROCEDURE — 90714 TD VACC NO PRESV 7 YRS+ IM: CPT | Performed by: INTERNAL MEDICINE

## 2024-10-14 PROCEDURE — 1126F AMNT PAIN NOTED NONE PRSNT: CPT | Performed by: INTERNAL MEDICINE

## 2024-10-14 PROCEDURE — 3077F SYST BP >= 140 MM HG: CPT | Performed by: INTERNAL MEDICINE

## 2024-10-14 PROCEDURE — 99214 OFFICE O/P EST MOD 30 MIN: CPT | Performed by: INTERNAL MEDICINE

## 2024-10-14 PROCEDURE — 1160F RVW MEDS BY RX/DR IN RCRD: CPT | Performed by: INTERNAL MEDICINE

## 2024-10-14 RX ORDER — AZELASTINE 1 MG/ML
2 SPRAY, METERED NASAL 2 TIMES DAILY
Qty: 3 EACH | Refills: 4 | Status: SHIPPED | OUTPATIENT
Start: 2024-10-14

## 2024-10-14 NOTE — PROGRESS NOTES
Chief Complaint   Patient presents with    Hypertension    Hyperlipidemia    leg wound    Allergic Rhinitis       Hypertension    Hyperlipidemia    Allergic Rhinitis  She complains of rhinorrhea.      Johana Corrigan is a 83 y.o. female presents for acute care. Patient notes that she recently had additional wound to right lower extremity. She notes it scraped on something in a restaurant when seated at a aguilar and noted wound when leaving. She has had multiple wounds to the lower extremities. Last td 2019. No recent vascular screening.   Pateint has hypertension. She is taking losartan 25 mg daily. She does note that she has received a new bottle of med in the last month. Has not recently tested bp. Low normal on most recent testing.         The following portions of the patient's history were reviewed and updated as appropriate: allergies, current medications, past family history, past medical history, past social history, past surgical history and problem list.  Current Outpatient Medications on File Prior to Visit   Medication Sig Dispense Refill    Calcium-Phosphorus-Vitamin D (CALCIUM/D3 ADULT GUMMIES PO) Take 2 tablets by mouth Every Morning.      losartan (COZAAR) 25 MG tablet Take 1 tablet by mouth Daily. 90 tablet 3    montelukast (SINGULAIR) 10 MG tablet Take 1 tablet by mouth Every Night. 90 tablet 3    mupirocin (BACTROBAN) 2 % ointment Apply 1 Application topically to the appropriate area as directed 3 (Three) Times a Day. 30 g 3    rosuvastatin (CRESTOR) 5 MG tablet Take 1 tablet by mouth Daily. 90 tablet 3    Synthroid 50 MCG tablet Take 1 tablet by mouth Daily. 90 tablet 1    [DISCONTINUED] azelastine (ASTELIN) 0.1 % nasal spray 2 sprays into the nostril(s) as directed by provider 2 (Two) Times a Day. Use in each nostril as directed 1 each 12    [DISCONTINUED] cephalexin (Keflex) 500 MG capsule Take 1 capsule by mouth 2 (Two) Times a Day. (Patient not taking: Reported on 10/14/2024) 14 capsule 0     [DISCONTINUED] saccharomyces boulardii (Florastor) 250 MG capsule Take 1 capsule by mouth 2 (Two) Times a Day. (Patient not taking: Reported on 10/14/2024) 20 capsule 0     No current facility-administered medications on file prior to visit.     Review of Systems   Constitutional: Negative.    HENT:  Positive for postnasal drip and rhinorrhea. Negative for sinus pain.    Eyes: Negative.    Respiratory: Negative.          Some congestion/ clears w cough   Cardiovascular: Negative.    Gastrointestinal: Negative.    Endocrine: Negative.    Genitourinary: Negative.    Musculoskeletal: Negative.    Skin:  Positive for wound.   Allergic/Immunologic: Negative.    Neurological: Negative.    Hematological: Negative.    Psychiatric/Behavioral: Negative.         Objective   Physical Exam  Vitals and nursing note reviewed.   Constitutional:       Appearance: Normal appearance.   HENT:      Head: Normocephalic and atraumatic.      Right Ear: Tympanic membrane normal.      Left Ear: Tympanic membrane normal.      Nose: Nose normal.      Mouth/Throat:      Mouth: Mucous membranes are moist.   Eyes:      Extraocular Movements: Extraocular movements intact.      Pupils: Pupils are equal, round, and reactive to light.   Cardiovascular:      Rate and Rhythm: Normal rate and regular rhythm.      Pulses: Normal pulses.      Heart sounds: Normal heart sounds.   Pulmonary:      Effort: Pulmonary effort is normal.      Breath sounds: Normal breath sounds.   Abdominal:      General: Abdomen is flat.      Palpations: Abdomen is soft.   Musculoskeletal:         General: Normal range of motion.      Cervical back: Normal range of motion.   Skin:     General: Skin is warm and dry.   Neurological:      General: No focal deficit present.      Mental Status: She is alert and oriented to person, place, and time.   Psychiatric:         Mood and Affect: Mood normal.         Behavior: Behavior normal.         Thought Content: Thought content normal.    "      Judgment: Judgment normal.          /78   Pulse 66   Ht 154.9 cm (61\")   Wt 52.2 kg (115 lb)   SpO2 98%   BMI 21.73 kg/m²     Assessment & Plan   Diagnoses and all orders for this visit:    Wound of right lower extremity, initial encounter  -     Td Vaccine => 8yo PF (TDVAX) 2-2  -     Ambulatory Referral to Wound Clinic    Abrasion, right lower leg, initial encounter  -     Td Vaccine => 8yo PF (TDVAX) 2-2    Hypertension, unspecified type    Seasonal allergic rhinitis, unspecified trigger    Other orders  -     azelastine (ASTELIN) 0.1 % nasal spray; Administer 2 sprays into the nostril(s) as directed by provider 2 (Two) Times a Day. Use in each nostril as directed      Patient with elevated bp. She is to maintain low caffeine and low sodium. She is to hydrate well. To test bp 4-6 times weekly. She will increase losartan if remains above goal. She has a new wound. She has recurrent wounds of her lower extremities. Will refer to wound care. She is to have vascular screening to ensure normal blood flow. She is advised knee high socks compressive. She will receive a tetanus booster today. Some sinus drainage. To restart azelasting continue flonase and singulair. She will f/u in 6 mo or prn.            "

## 2024-10-15 ENCOUNTER — TRANSCRIBE ORDERS (OUTPATIENT)
Dept: ADMINISTRATIVE | Facility: HOSPITAL | Age: 84
End: 2024-10-15
Payer: MEDICARE

## 2024-10-15 DIAGNOSIS — Z13.6 ENCOUNTER FOR SCREENING FOR VASCULAR DISEASE: Primary | ICD-10-CM

## 2024-10-18 ENCOUNTER — OFFICE VISIT (OUTPATIENT)
Dept: WOUND CARE | Facility: HOSPITAL | Age: 84
End: 2024-10-18
Payer: MEDICARE

## 2024-11-01 ENCOUNTER — OFFICE VISIT (OUTPATIENT)
Dept: WOUND CARE | Facility: HOSPITAL | Age: 84
End: 2024-11-01
Payer: MEDICARE

## 2024-11-11 ENCOUNTER — TREATMENT (OUTPATIENT)
Dept: PHYSICAL THERAPY | Facility: CLINIC | Age: 84
End: 2024-11-11
Payer: MEDICARE

## 2024-11-11 DIAGNOSIS — R29.3 POOR POSTURE: ICD-10-CM

## 2024-11-11 DIAGNOSIS — M54.50 CHRONIC BILATERAL LOW BACK PAIN WITHOUT SCIATICA: Primary | ICD-10-CM

## 2024-11-11 DIAGNOSIS — R26.89 LOSS OF BALANCE: ICD-10-CM

## 2024-11-11 DIAGNOSIS — G89.29 CHRONIC BILATERAL LOW BACK PAIN WITHOUT SCIATICA: Primary | ICD-10-CM

## 2024-11-11 DIAGNOSIS — R29.898 LOSS OF MOVEMENT: ICD-10-CM

## 2024-11-11 DIAGNOSIS — R53.1 STRENGTH LOSS OF: ICD-10-CM

## 2024-11-11 NOTE — PROGRESS NOTES
Physical Therapy Initial Evaluation and Plan of Care      Patient: Johana Corrigan   : 1940  Diagnosis/ICD-10 Code:  Chronic bilateral low back pain without sciatica [M54.50, G89.29]  Referring practitioner: Main Burt MD  Date of Initial Visit: 2024  Today's Date: 2024  Patient seen for 1 sessions           Subjective: Johana was diagnosed with lumbar spine fractures 6-8 months ago.  She was seen by Dr. Burt due to pain.   Since then diagnostic testing has shown that the fractures are healing.  Currently, she is avoiding a lot of activity that stressed her back in the past like lifting and overdoing things.  Johana is still very active generally, a member at [employer] and likes to walk around the track and has avoided strengthening machines since this issue arose. Johana is not experiencing symptoms in her legs.  Johana avoids standing for long periods of time, 45 minutes in one spot is difficult.  Sitting causes discomfort 15 minutes into it and she has to move around to be comfortable.  Johana reports no issues with balance and has fallen once in the past year.  This fall occurred due to turning to quickly and fell down without injury.   PMH: hypothyroidism treated with medication, chronic circulatory issues in her legs which are being looked into,  3 child births, sons  SH:  with 3 children, 7 grandchildren, retired, she is involved    Objective     Observation: Johana arrived in the clinic ambulating independently without an assistive device. Her gait pattern is slightly abnormal, with a slight decrease in heel strike and decreased lower trunk rotation.  She has B bunions at the first MTP joint and hallux valgus, B.     Posture  Lateral view: forward head, increased thoracic kyphosis and decreased lumbar lordosis  Posterior view:    Lumbar spine AROM  Flexion, minimal loss  LB: moderate loss, B, pain going to her R at end range  Extension: moderate to severe loss    DTRs:  Patellar B Patellar WNL/equal and Velasquez's, B trace/equal  Upper motor neuron: Babinski and Clonus were normal  Sensation:  L1-S2 dermatomes were intact to light perception, B  Motor control: hip flexion B 4-/5, knee extension B 4/5, ankle dorsiflexion B 4+ to 5/5, EHL B 4+ to 5/5, knee flexion B 4/5 and hip extension 4 to 4+/5    Balance tests  Romberg: normal  Sharpened Romberg: in half tandem, failure at 10 seconds.     Functional Outcome Score: OLIVIA, 9/50=18% deficit    Treatment    Self care: I gave Johana the standing balance exercise, in half tandem to do 3 times per day, She practiced one repetitions with me, alternating the front foot and required SBA-1 for safety.     Assessment & Plan       Assessment  Impairments: abnormal gait, abnormal or restricted ROM, activity intolerance, lacks appropriate home exercise program, pain with function and safety issue   Other impairment: poor posture  Functional limitations: carrying objects, lifting, walking, pulling, pushing, standing and stooping   Assessment details: Johana Corrigan is a 84 y.o. female referred to physical therapy for chronic bilateral low back pain without sciatica. She presents with an evolving clinical presentation.  She has comorbidities to include soft/bony tissue adaptations to poor posture.  Johana has no known personal factors  that may affect her progress in the plan of care.  Signs and symptoms are consistent with physical therapy diagnosis of chronic bilateral low back pain without sciatica, loss of movement, strength loss of, loss of balance, abnormal gait, decreased tolerance to sitting/standing and she will require education for self care.    Prognosis: good    Goals  Plan Goals: STGs to be met in 6 weeks  1. Johana begins instruction in mat exercises for improve core muscular control and better spinal mobility.   2. Low level closed chain exercises for improved leg strength are begun.   3. Carri-scapular exercises with theraband are begun  to improve standing posture.    LTGs to be met in 12 weeks  1. Johana improves tolerance in sitting and is able to sit for 30 minutes comfortably.  2. She is independent with self care and a HEP.  3. OLIVIA deficit is decreased to below 13%.     Plan  Therapy options: will be seen for skilled therapy services  Planned therapy interventions: manual therapy, neuromuscular re-education, postural training, orthotic fitting/training, strengthening, stretching, therapeutic activities, transfer training, home exercise program, gait training, functional ROM exercises, flexibility, body mechanics training, balance/weight-bearing training, ADL retraining and abdominal trunk stabilization  Frequency: 1-2 x per week.  Duration in weeks: 12  Treatment plan discussed with: patient  Plan details: Next visit to begin instruction with core muscle control in hook lying and mat exercises for spinal mobility.         Timed:  Manual Therapy:         mins  09958;  Therapeutic Exercise:         mins  41122;     Neuromuscular Eduardo:  2      mins  99050;    Therapeutic Activity:          mins  05661;     Gait Training:           mins  38119;     Ultrasound:          mins  90545;    Iontophoresis         mins 67550  Dry Needling        mins 31498/ 06558 (Self-pay)  Self care                      8 mins     Untimed:  Electrical Stimulation:         mins  18817 ( );  Traction:       mins  81609;   Low Eval          Mins  40861  Mod Eval          Mins  94880  High Eval                            Mins  91353    Timed Treatment:   10   mins   Total Treatment:     45   mins    PT SIGNATURE: Bull Pepper PT     License Number: [id number]    Electronically signed by Bull Pepper PT, 11/11/24, 8:39 AM EST    DATE TREATMENT INITIATED: 11/11/2024    Initial Certification  Certification Period: 2/9/2025  I certify that the therapy services are furnished while this patient is under my care.  The services outlined above are required by this patient,  and will be reviewed every 90 days.     PHYSICIAN: Main Burt MD   NPI: [id number]                                         DATE:     Please sign and return via fax to [phone] Thank you, [facility].

## 2024-11-15 ENCOUNTER — TREATMENT (OUTPATIENT)
Dept: PHYSICAL THERAPY | Facility: CLINIC | Age: 84
End: 2024-11-15
Payer: MEDICARE

## 2024-11-15 DIAGNOSIS — R26.89 LOSS OF BALANCE: ICD-10-CM

## 2024-11-15 DIAGNOSIS — M54.50 CHRONIC BILATERAL LOW BACK PAIN WITHOUT SCIATICA: Primary | ICD-10-CM

## 2024-11-15 DIAGNOSIS — R29.3 POOR POSTURE: ICD-10-CM

## 2024-11-15 DIAGNOSIS — G89.29 CHRONIC BILATERAL LOW BACK PAIN WITHOUT SCIATICA: Primary | ICD-10-CM

## 2024-11-15 DIAGNOSIS — R29.898 LOSS OF MOVEMENT: ICD-10-CM

## 2024-11-15 DIAGNOSIS — R53.1 STRENGTH LOSS OF: ICD-10-CM

## 2024-11-15 NOTE — PROGRESS NOTES
"Physical Therapy Daily Treatment Note    [facility]  [address] [place] [address]  [phone] (phone)  [phone] (fax)    Patient: Johana Corrigan   : 1940  Diagnosis/ICD-10 Code:  Chronic bilateral low back pain without sciatica [M54.50, G89.29]  Referring practitioner: Main Burt MD  Today's Date: 11/15/2024  Patient seen for 2 sessions    Visit Diagnoses:    ICD-10-CM ICD-9-CM   1. Chronic bilateral low back pain without sciatica  M54.50 724.2    G89.29 338.29   2. Poor posture  R29.3 781.92   3. Loss of movement  R29.898 344.9   4. Strength loss of  R53.1 780.79   5. Loss of balance  R26.89 781.99              Subjective:: Johana reported that she is wearing her brace that helps her to be less kyphotic.     Objective     Treatment    Therapeutic exercise  /12 of a mile ambulation  Runner's step, 4\", x 10, B, using column for balance  Lateral step down, 4\", x 10, B, using column for balance  Standing shoulder extension, red theraband, 10 x 2  Standing row, red theraband, 10 x 2  Wand flexion x 10  Corner stretch, 30s x 3  Lying unilateral elbow extension, 2 lbs., 10 x 2, B    NMR: verbal cues for each exercise were given to improve technique. For the scapular exercises the direction to pull the scapula, for the wand flexion to stretch the torso lightly, for the corner stretch to stretch the pectorals gently and for the lying triceps extension to use eccentric control.     Self care:  I gave each exercise performed today for her HEP.    Assessment & Plan       Assessment  Assessment details: Good tolerance to today's exercises to improve posture and balance.     Plan  Plan details: Continue per treatment plan.                Timed:    Manual Therapy:         mins  79000;  Therapeutic Exercise:    35     mins  79441;     Neuromuscular Eduardo:    8    mins  36138;    Therapeutic Activity:          mins  75789;     Gait Training:           mins  10575;  "    Ultrasound:          mins  90727;    Aquatic Therapy           mins     62790;  Self Care                               mins   66204        Untimed:  Electrical Stimulation:           mins  50109 ( );  Traction:           mins  57902;   Dry Needling  (1-2 muscles)                  mins 20560 (Self-pay)  Dry Needling (3-4 muscles)  ____  20561 (Self-pay)  Dry Needling Trial             DRYNDLTRIAL  (No Charge)    Timed Treatment:   43   mins   Total Treatment:     43   mins    Bull Pepper PT  Physical Therapist    [place] License:[id number]

## 2024-11-18 RX ORDER — LEVOTHYROXINE SODIUM 50 MCG
50 TABLET ORAL DAILY
Qty: 90 TABLET | Refills: 3 | Status: SHIPPED | OUTPATIENT
Start: 2024-11-18

## 2024-11-20 ENCOUNTER — OFFICE VISIT (OUTPATIENT)
Dept: WOUND CARE | Facility: HOSPITAL | Age: 84
End: 2024-11-20
Payer: MEDICARE

## 2024-11-22 ENCOUNTER — TREATMENT (OUTPATIENT)
Dept: PHYSICAL THERAPY | Facility: CLINIC | Age: 84
End: 2024-11-22
Payer: MEDICARE

## 2024-11-22 DIAGNOSIS — M54.50 CHRONIC BILATERAL LOW BACK PAIN WITHOUT SCIATICA: Primary | ICD-10-CM

## 2024-11-22 DIAGNOSIS — G89.29 CHRONIC BILATERAL LOW BACK PAIN WITHOUT SCIATICA: Primary | ICD-10-CM

## 2024-11-22 DIAGNOSIS — R53.1 STRENGTH LOSS OF: ICD-10-CM

## 2024-11-22 DIAGNOSIS — R29.898 LOSS OF MOVEMENT: ICD-10-CM

## 2024-11-22 DIAGNOSIS — R26.89 LOSS OF BALANCE: ICD-10-CM

## 2024-11-22 DIAGNOSIS — R29.3 POOR POSTURE: ICD-10-CM

## 2024-11-22 NOTE — PROGRESS NOTES
"Physical Therapy Daily Treatment Note    [facility] [place] [address]  [phone] (phone)  [phone] (fax)    Patient: Johana Corrigan   : 1940  Diagnosis/ICD-10 Code:  Chronic bilateral low back pain without sciatica [M54.50, G89.29]  Referring practitioner: Main Burt MD  Today's Date: 2024  Patient seen for 3 sessions    Visit Diagnoses:    ICD-10-CM ICD-9-CM   1. Chronic bilateral low back pain without sciatica  M54.50 724.2    G89.29 338.29   2. Poor posture  R29.3 781.92   3. Loss of movement  R29.898 344.9   4. Strength loss of  R53.1 780.79   5. Loss of balance  R26.89 781.99              Subjective: Johana stated that the towel roll felt good while sitting in the standard chair.     Objective     Treatment    Therapeutic exercise   of a mile ambulation  Runner's step, 4\", x 10, B, using column for balance  Lateral step down, 4\", x 10, B, using column for balance  Standing shoulder extension, red theraband, 10 x 2  Standing row, red theraband, 10 x 2  Wand flexion x 10  Lying triceps extension, 2 lb., 10 x 2, B  Modified bridge, 10s x 10  Corner stretch, 30s x 3    NMR: verbal cues for exercise were given.  Johana also walked 20' forward and then in reverse of the monster walk, SBA-1, eyes open and 20' forward/reverse, eyes closed, with CGA-1.  For the modified brdige I used cues to set her core, avoid excessive lift and to lower slowly keeping the core engaged until she was resting on the bed.      Therapeutic activity:  Johana sat in the standard chair with a rolled towel in her low back for improved posture for 1 minutes    Self care: I added the modified bridge to her HEP.  I suggested to use a towel roll in the lumbar area to help her sitting posture and if that is helpful to consider getting a Kraig roll to use for a more permanent answer for car trips.     Assessment & Plan       Assessment  Assessment details: " Johana progressed her HEP with the modified bridge.  The towel roll was comfortable and did bring her spine to a more neutral resting position in the chair.  Johana required verbal cues for exercise technique and CGA-1 for the eyes closed version of the monster walk.     Plan  Plan details: Continue per treatment plan.                Timed:    Manual Therapy:         mins  76873;  Therapeutic Exercise:    35     mins  24270;     Neuromuscular Eduardo:    8    mins  03383;    Therapeutic Activity:     1     mins  73979;     Gait Training:           mins  95619;     Ultrasound:          mins  88806;    Aquatic Therapy           mins     60109;  Self Care                         1      mins   13061        Untimed:  Electrical Stimulation:           mins  22569 ( );  Traction:           mins  85177;   Dry Needling  (1-2 muscles)                  mins 20560 (Self-pay)  Dry Needling (3-4 muscles)  ____  20561 (Self-pay)  Dry Needling Trial             DRYNDLTRIAL  (No Charge)    Timed Treatment:   45   mins   Total Treatment:     45   mins    Bull Pepper PT  Physical Therapist    [place] License:[id number]

## 2024-12-06 ENCOUNTER — TREATMENT (OUTPATIENT)
Dept: PHYSICAL THERAPY | Facility: CLINIC | Age: 84
End: 2024-12-06
Payer: MEDICARE

## 2024-12-06 DIAGNOSIS — R29.3 POOR POSTURE: ICD-10-CM

## 2024-12-06 DIAGNOSIS — R29.898 LOSS OF MOVEMENT: ICD-10-CM

## 2024-12-06 DIAGNOSIS — M54.50 CHRONIC BILATERAL LOW BACK PAIN WITHOUT SCIATICA: Primary | ICD-10-CM

## 2024-12-06 DIAGNOSIS — G89.29 CHRONIC BILATERAL LOW BACK PAIN WITHOUT SCIATICA: Primary | ICD-10-CM

## 2024-12-06 DIAGNOSIS — R53.1 STRENGTH LOSS OF: ICD-10-CM

## 2024-12-06 DIAGNOSIS — R26.89 LOSS OF BALANCE: ICD-10-CM

## 2024-12-06 NOTE — PROGRESS NOTES
"Physical Therapy Daily Treatment Note    [facility]  [address] [place] [address]  [phone] (phone)  [phone] (fax)    Patient: Johana Corrigan   : 1940  Diagnosis/ICD-10 Code:  Chronic bilateral low back pain without sciatica [M54.50, G89.29]  Referring practitioner: Main Burt MD  Today's Date: 2024  Patient seen for 4 sessions    Visit Diagnoses:    ICD-10-CM ICD-9-CM   1. Chronic bilateral low back pain without sciatica  M54.50 724.2    G89.29 338.29   2. Poor posture  R29.3 781.92   3. Loss of movement  R29.898 344.9   4. Strength loss of  R53.1 780.79   5. Loss of balance  R26.89 781.99              Subjective: Johana stated that two months ago she after her last diagnostic tests two months ago Dr. Burt stated that her fractures of the lumbar spine were healing.  She has no complaints of pain currently.  Johana went on long drives to [facility].  her back was comfortable.       Objective     Therapeutic exercise  1/12 of a mile ambulation  Standing shoulder extension, green theraband, 10 x 2  Standing row, green theraband, 10 x 2  Runner's step, 4\", x 10, B, using column for balance  Lateral step down, 4\", x 10, B, using column for balance  6. Seated thoracic extension, towel roll at mid throacic area, x 10  7. Lying triceps extension, 2 lbs.., 10 x 2, B  8. Wand flexion x 10, hook lying with pillow and towel roll under neck.   9. Modified brdige, 10s x 10    NMR: SLS several repetitions x 5 on each leg to point of failure, cues for that exercise and the mid thoracic spine extension in sitting were used for proper technique. Verbal and tactile cures were used for the step exercises and theraband scapular movements.     Self care: I progressed the scapular exercises with green theraband, added the SLS balance exercise and seated thoracic extension to her HEP.     Assessment & Plan       Assessment  Assessment details: Johana is " noticing improvement.  I was able to successfully progress her HEP with the SLS and seated thoracic extension exercises.  She required verbal/tactile cues for exercise technique and tolerated today's treatment well.     Plan  Plan details: Reassess and treat next visit.                Timed:    Manual Therapy:         mins  19314;  Therapeutic Exercise:    35     mins  01786;     Neuromuscular Eduardo:    8    mins  48088;    Therapeutic Activity:          mins  45324;     Gait Training:           mins  99104;     Ultrasound:          mins  77148;    Aquatic Therapy           mins     58287;  Self Care                         2      mins   54933        Untimed:  Electrical Stimulation:           mins  09884 ( );  Traction:           mins  82749;   Dry Needling  (1-2 muscles)                  mins 06783 (Self-pay)  Dry Needling (3-4 muscles)  ____  20561 (Self-pay)  Dry Needling Trial             DRYNDLTRIAL  (No Charge)    Timed Treatment:   45   mins   Total Treatment:     45   mins    Bull Pepper PT  Physical Therapist    [place] License:

## 2024-12-09 ENCOUNTER — OFFICE VISIT (OUTPATIENT)
Dept: INTERNAL MEDICINE | Facility: CLINIC | Age: 84
End: 2024-12-09
Payer: MEDICARE

## 2024-12-09 VITALS
HEIGHT: 61 IN | OXYGEN SATURATION: 98 % | WEIGHT: 114 LBS | DIASTOLIC BLOOD PRESSURE: 74 MMHG | BODY MASS INDEX: 21.52 KG/M2 | HEART RATE: 68 BPM | SYSTOLIC BLOOD PRESSURE: 136 MMHG

## 2024-12-09 DIAGNOSIS — L98.9 SKIN LESION: Primary | ICD-10-CM

## 2024-12-09 PROCEDURE — 3075F SYST BP GE 130 - 139MM HG: CPT | Performed by: INTERNAL MEDICINE

## 2024-12-09 PROCEDURE — 1126F AMNT PAIN NOTED NONE PRSNT: CPT | Performed by: INTERNAL MEDICINE

## 2024-12-09 PROCEDURE — 3078F DIAST BP <80 MM HG: CPT | Performed by: INTERNAL MEDICINE

## 2024-12-09 PROCEDURE — 99213 OFFICE O/P EST LOW 20 MIN: CPT | Performed by: INTERNAL MEDICINE

## 2024-12-09 NOTE — PROGRESS NOTES
Chief Complaint   Patient presents with    Wound Check       Wound Check       Johana Corrigan is a 84 y.o. female presents for acute care. Angelina has frequent leg wounds. She has a new one v other finding today. Present for greater than 8 months.       The following portions of the patient's history were reviewed and updated as appropriate: allergies, current medications, past family history, past medical history, past social history, past surgical history and problem list.  Current Outpatient Medications on File Prior to Visit   Medication Sig Dispense Refill    azelastine (ASTELIN) 0.1 % nasal spray Administer 2 sprays into the nostril(s) as directed by provider 2 (Two) Times a Day. Use in each nostril as directed 3 each 4    Calcium-Phosphorus-Vitamin D (CALCIUM/D3 ADULT GUMMIES PO) Take 2 tablets by mouth Every Morning.      losartan (COZAAR) 25 MG tablet Take 1 tablet by mouth Daily. 90 tablet 3    montelukast (SINGULAIR) 10 MG tablet Take 1 tablet by mouth Every Night. 90 tablet 3    mupirocin (BACTROBAN) 2 % ointment Apply 1 Application topically to the appropriate area as directed 3 (Three) Times a Day. 30 g 3    rosuvastatin (CRESTOR) 5 MG tablet Take 1 tablet by mouth Daily. 90 tablet 3    Synthroid 50 MCG tablet TAKE 1 TABLET EVERY DAY 90 tablet 3     No current facility-administered medications on file prior to visit.     Review of Systems   Constitutional: Negative.    HENT: Negative.     Eyes: Negative.    Respiratory: Negative.     Cardiovascular: Negative.    Gastrointestinal: Negative.    Endocrine: Negative.    Genitourinary: Negative.    Musculoskeletal: Negative.    Skin: Negative.    Allergic/Immunologic: Negative.    Neurological: Negative.    Hematological: Negative.    Psychiatric/Behavioral: Negative.         Objective   Physical Exam  Vitals and nursing note reviewed.   Constitutional:       Appearance: Normal appearance. She is well-developed.   HENT:      Head: Normocephalic and  "atraumatic.      Right Ear: External ear normal.      Left Ear: External ear normal.      Nose: Nose normal.   Eyes:      Pupils: Pupils are equal, round, and reactive to light.   Cardiovascular:      Rate and Rhythm: Normal rate and regular rhythm.      Heart sounds: Normal heart sounds.   Pulmonary:      Effort: Pulmonary effort is normal. No respiratory distress.      Breath sounds: Normal breath sounds.   Abdominal:      Palpations: Abdomen is soft.   Musculoskeletal:         General: Normal range of motion.      Cervical back: Neck supple.   Skin:     General: Skin is warm and dry.      Findings: Lesion present.      Comments: Lesion left anterior tibial region. Atypical w scaling and erythema/ raised borders.    Neurological:      Mental Status: She is alert and oriented to person, place, and time.   Psychiatric:         Mood and Affect: Mood normal.         Behavior: Behavior normal.         Thought Content: Thought content normal.         Judgment: Judgment normal.          /74   Pulse 68   Ht 154.9 cm (61\")   Wt 51.7 kg (114 lb)   SpO2 98%   BMI 21.54 kg/m²     Assessment & Plan   Diagnoses and all orders for this visit:    Skin lesion      Patient w atypical skin lesion left leg. SCC v BCC v irregular sk v other. To derm asap. Will assist in moving up scheduled appointment. Advised to d/c tcm ointment. She may cover with aquaphore as a barrier.            "

## 2024-12-10 ENCOUNTER — TELEPHONE (OUTPATIENT)
Dept: PHYSICAL THERAPY | Facility: CLINIC | Age: 84
End: 2024-12-10

## 2024-12-10 NOTE — TELEPHONE ENCOUNTER
PATIENT CALLED TO LET US KNOW THAT THE REASON THAT SHE NO-SHOWED HER APPOINTMENT TODAY IS BECAUSE SHE WAS SICK ALL NIGHT AND STILL NOT FEELING WELL

## 2024-12-13 ENCOUNTER — TREATMENT (OUTPATIENT)
Dept: PHYSICAL THERAPY | Facility: CLINIC | Age: 84
End: 2024-12-13
Payer: MEDICARE

## 2024-12-13 DIAGNOSIS — M54.50 CHRONIC BILATERAL LOW BACK PAIN WITHOUT SCIATICA: Primary | ICD-10-CM

## 2024-12-13 DIAGNOSIS — G89.29 CHRONIC BILATERAL LOW BACK PAIN WITHOUT SCIATICA: Primary | ICD-10-CM

## 2024-12-13 DIAGNOSIS — R53.1 STRENGTH LOSS OF: ICD-10-CM

## 2024-12-13 DIAGNOSIS — R29.3 POOR POSTURE: ICD-10-CM

## 2024-12-13 DIAGNOSIS — R26.89 LOSS OF BALANCE: ICD-10-CM

## 2024-12-13 DIAGNOSIS — R29.898 LOSS OF MOVEMENT: ICD-10-CM

## 2024-12-13 NOTE — PROGRESS NOTES
Physical Therapy Daily Treatment/Discharge Note    [facility]  [address] [place] [address]  [phone] (phone)  [phone] (fax)    Patient: Johana Corrigan   : 1940  Diagnosis/ICD-10 Code:  Chronic bilateral low back pain without sciatica [M54.50, G89.29]  Referring practitioner: Main Burt MD  Today's Date: 2024  Patient seen for 5 sessions    Visit Diagnoses:    ICD-10-CM ICD-9-CM   1. Chronic bilateral low back pain without sciatica  M54.50 724.2    G89.29 338.29   2. Poor posture  R29.3 781.92   3. Loss of movement  R29.898 344.9   4. Strength loss of  R53.1 780.79   5. Loss of balance  R26.89 781.99              Subjective:  Johana stated that her low back is doing really well and physical therapy has made a tremendous difference.  She feels that she is ready to be discharged.     Objective     Lumbar AROM  Flexion: WFL  LB: moderate loss, B  Extension: moderate to severe loss    Therapeutic exercise  Standing shoulder extension, green theraband, 10 x 2  Standing row, green theraband, 10 x 2  1/12 of a mile ambulation around the track  Lying triceps extension, 2 lbs., 10 x 2, B  SKTC, hook lying, 10s x 10, B  LTR x 10  Modified bridge, 10s x 10  CV, craniovertebral flexion, x 10  Wand flexion x 10    NMR: verbal cues for exercise technique were given.     Self care: I gave Johana exercises 5-9 to do daily and to do her theraband exercises for her shoulders and the triceps exercises 3 x per week.     Assessment & Plan       Assessment  Assessment details: Johana Corrigan was seen for 5 physical therapy sessions for chronic bilateral low back pain without sciatica.  Treatment included therapeutic exercise, manual therapy, neuro-muscular retraining , and patient education with home exercise program . Progress to physical therapy goals was good.  She was discharged to an independent [facility] and provided patient education to self-manage condition.       Goals  Plan Goals:  STGs to be met in 6 weeks  1. Johana begins instruction in mat exercises for improve core muscular control and better spinal mobility. (Met)  2. Low level closed chain exercises for improved leg strength are begun. (Met)  3. Carri-scapular exercises with theraband are begun to improve standing posture. (Met)     LTGs to be met in 12 weeks  1. Johana improves tolerance in sitting and is able to sit for 30 minutes comfortably.(Met)  2. She is independent with self care and a HEP. (Met)  3. OLIVIA deficit is decreased to below 13%. (Met)         Plan  Plan details: Discharged from skilled physical therapy               Timed:    Manual Therapy:         mins  17617;  Therapeutic Exercise:    25     mins  01358;     Neuromuscular Eduardo:    3    mins  56653;    Therapeutic Activity:          mins  38492;     Gait Training:           mins  23266;     Ultrasound:          mins  86210;    Aquatic Therapy           mins     44145;  Self Care                        10       mins   68378        Untimed:  Electrical Stimulation:           mins  89078 ( );  Traction:           mins  55501;   Dry Needling  (1-2 muscles)                  mins 20560 (Self-pay)  Dry Needling (3-4 muscles)  ____  20561 (Self-pay)  Dry Needling Trial             DRYNDLTRIAL  (No Charge)    Timed Treatment:   38   mins   Total Treatment:     38   mins    Bull Pepper PT  Physical Therapist    [place] License:[id number]

## 2025-02-27 DIAGNOSIS — E03.9 HYPOTHYROIDISM, UNSPECIFIED TYPE: ICD-10-CM

## 2025-02-27 DIAGNOSIS — I10 ESSENTIAL HYPERTENSION: ICD-10-CM

## 2025-02-27 DIAGNOSIS — E78.5 HYPERLIPIDEMIA, UNSPECIFIED HYPERLIPIDEMIA TYPE: Primary | ICD-10-CM

## 2025-03-08 LAB
ALBUMIN SERPL-MCNC: 3.8 G/DL (ref 3.5–5.2)
ALBUMIN/GLOB SERPL: 1.6 G/DL
ALP SERPL-CCNC: 56 U/L (ref 39–117)
ALT SERPL-CCNC: 18 U/L (ref 1–33)
APPEARANCE UR: ABNORMAL
AST SERPL-CCNC: 28 U/L (ref 1–32)
BACTERIA #/AREA URNS HPF: NORMAL /[HPF]
BASOPHILS # BLD AUTO: 0.03 10*3/MM3 (ref 0–0.2)
BASOPHILS NFR BLD AUTO: 0.7 % (ref 0–1.5)
BILIRUB SERPL-MCNC: 0.3 MG/DL (ref 0–1.2)
BILIRUB UR QL STRIP: NEGATIVE
BUN SERPL-MCNC: 20 MG/DL (ref 8–23)
BUN/CREAT SERPL: 23.3 (ref 7–25)
CALCIUM SERPL-MCNC: 9.9 MG/DL (ref 8.6–10.5)
CASTS URNS QL MICRO: NORMAL /LPF
CHLORIDE SERPL-SCNC: 104 MMOL/L (ref 98–107)
CHOLEST SERPL-MCNC: 186 MG/DL (ref 0–200)
CO2 SERPL-SCNC: 25 MMOL/L (ref 22–29)
COLOR UR: YELLOW
CREAT SERPL-MCNC: 0.86 MG/DL (ref 0.57–1)
EGFRCR SERPLBLD CKD-EPI 2021: 66.7 ML/MIN/1.73
EOSINOPHIL # BLD AUTO: 0.07 10*3/MM3 (ref 0–0.4)
EOSINOPHIL NFR BLD AUTO: 1.6 % (ref 0.3–6.2)
EPI CELLS #/AREA URNS HPF: NORMAL /HPF (ref 0–10)
ERYTHROCYTE [DISTWIDTH] IN BLOOD BY AUTOMATED COUNT: 12.3 % (ref 12.3–15.4)
GLOBULIN SER CALC-MCNC: 2.4 GM/DL
GLUCOSE SERPL-MCNC: 87 MG/DL (ref 65–99)
GLUCOSE UR QL STRIP: NEGATIVE
HCT VFR BLD AUTO: 44.4 % (ref 34–46.6)
HDLC SERPL-MCNC: 93 MG/DL (ref 40–60)
HGB BLD-MCNC: 14.6 G/DL (ref 12–15.9)
HGB UR QL STRIP: NEGATIVE
IMM GRANULOCYTES # BLD AUTO: 0.01 10*3/MM3 (ref 0–0.05)
IMM GRANULOCYTES NFR BLD AUTO: 0.2 % (ref 0–0.5)
KETONES UR QL STRIP: NEGATIVE
LDLC SERPL CALC-MCNC: 86 MG/DL (ref 0–100)
LEUKOCYTE ESTERASE UR QL STRIP: NEGATIVE
LYMPHOCYTES # BLD AUTO: 1.53 10*3/MM3 (ref 0.7–3.1)
LYMPHOCYTES NFR BLD AUTO: 34.1 % (ref 19.6–45.3)
MCH RBC QN AUTO: 31 PG (ref 26.6–33)
MCHC RBC AUTO-ENTMCNC: 32.9 G/DL (ref 31.5–35.7)
MCV RBC AUTO: 94.3 FL (ref 79–97)
MICRO URNS: ABNORMAL
MICRO URNS: ABNORMAL
MONOCYTES # BLD AUTO: 0.36 10*3/MM3 (ref 0.1–0.9)
MONOCYTES NFR BLD AUTO: 8 % (ref 5–12)
NEUTROPHILS # BLD AUTO: 2.49 10*3/MM3 (ref 1.7–7)
NEUTROPHILS NFR BLD AUTO: 55.4 % (ref 42.7–76)
NITRITE UR QL STRIP: NEGATIVE
NRBC BLD AUTO-RTO: 0 /100 WBC (ref 0–0.2)
PH UR STRIP: 7 [PH] (ref 5–7.5)
PLATELET # BLD AUTO: 214 10*3/MM3 (ref 140–450)
POTASSIUM SERPL-SCNC: 3.8 MMOL/L (ref 3.5–5.2)
PROT SERPL-MCNC: 6.2 G/DL (ref 6–8.5)
PROT UR QL STRIP: NEGATIVE
RBC # BLD AUTO: 4.71 10*6/MM3 (ref 3.77–5.28)
RBC #/AREA URNS HPF: NORMAL /HPF (ref 0–2)
SODIUM SERPL-SCNC: 139 MMOL/L (ref 136–145)
SP GR UR STRIP: 1.02 (ref 1–1.03)
TRIGL SERPL-MCNC: 33 MG/DL (ref 0–150)
TSH SERPL DL<=0.005 MIU/L-ACNC: 4.4 UIU/ML (ref 0.27–4.2)
URINALYSIS REFLEX: ABNORMAL
UROBILINOGEN UR STRIP-MCNC: 0.2 MG/DL (ref 0.2–1)
VLDLC SERPL CALC-MCNC: 7 MG/DL (ref 5–40)
WBC # BLD AUTO: 4.49 10*3/MM3 (ref 3.4–10.8)
WBC #/AREA URNS HPF: NORMAL /HPF (ref 0–5)

## 2025-03-11 ENCOUNTER — OFFICE VISIT (OUTPATIENT)
Dept: INTERNAL MEDICINE | Facility: CLINIC | Age: 85
End: 2025-03-11
Payer: MEDICARE

## 2025-03-11 VITALS
HEART RATE: 66 BPM | HEIGHT: 61 IN | RESPIRATION RATE: 12 BRPM | OXYGEN SATURATION: 97 % | WEIGHT: 118.4 LBS | DIASTOLIC BLOOD PRESSURE: 70 MMHG | BODY MASS INDEX: 22.36 KG/M2 | SYSTOLIC BLOOD PRESSURE: 130 MMHG

## 2025-03-11 DIAGNOSIS — E03.9 HYPOTHYROIDISM, UNSPECIFIED TYPE: ICD-10-CM

## 2025-03-11 DIAGNOSIS — J30.89 NON-SEASONAL ALLERGIC RHINITIS, UNSPECIFIED TRIGGER: ICD-10-CM

## 2025-03-11 DIAGNOSIS — E78.5 HYPERLIPIDEMIA, UNSPECIFIED HYPERLIPIDEMIA TYPE: ICD-10-CM

## 2025-03-11 DIAGNOSIS — M85.89 OSTEOPENIA OF MULTIPLE SITES: ICD-10-CM

## 2025-03-11 DIAGNOSIS — Z13.6 ENCOUNTER FOR SCREENING FOR VASCULAR DISEASE: ICD-10-CM

## 2025-03-11 DIAGNOSIS — Z00.00 HEALTHCARE MAINTENANCE: Primary | ICD-10-CM

## 2025-03-11 DIAGNOSIS — I10 ESSENTIAL HYPERTENSION: ICD-10-CM

## 2025-03-11 DIAGNOSIS — M17.0 PRIMARY OSTEOARTHRITIS OF BOTH KNEES: ICD-10-CM

## 2025-03-11 DIAGNOSIS — Z23 NEED FOR VACCINATION: ICD-10-CM

## 2025-03-11 RX ORDER — FLUTICASONE PROPIONATE 50 MCG
2 SPRAY, SUSPENSION (ML) NASAL DAILY
Qty: 16 G | Refills: 5 | Status: SHIPPED | OUTPATIENT
Start: 2025-03-11

## 2025-03-11 RX ORDER — IPRATROPIUM BROMIDE 42 UG/1
2 SPRAY, METERED NASAL 3 TIMES DAILY
Qty: 15 ML | Refills: 12 | Status: SHIPPED | OUTPATIENT
Start: 2025-03-11

## 2025-03-11 NOTE — PROGRESS NOTES
Subjective   The ABCs of the Annual Wellness Visit  Medicare Wellness Visit      Johana Corrigan is a 84 y.o. patient who presents for a Medicare Wellness Visit.    The following portions of the patient's history were reviewed and   updated as appropriate: allergies, current medications, past family history, past medical history, past social history, past surgical history, and problem list.    Compared to one year ago, the patient's physical   health is the same.  Compared to one year ago, the patient's mental   health is better.    Recent Hospitalizations:  She was not admitted to the hospital during the last year.     Current Medical Providers:  Patient Care Team:  Sola Gómez MD as PCP - General    Outpatient Medications Prior to Visit   Medication Sig Dispense Refill    azelastine (ASTELIN) 0.1 % nasal spray Administer 2 sprays into the nostril(s) as directed by provider 2 (Two) Times a Day. Use in each nostril as directed 3 each 4    Calcium-Phosphorus-Vitamin D (CALCIUM/D3 ADULT GUMMIES PO) Take 2 tablets by mouth Every Morning.      losartan (COZAAR) 25 MG tablet Take 1 tablet by mouth Daily. 90 tablet 3    montelukast (SINGULAIR) 10 MG tablet Take 1 tablet by mouth Every Night. 90 tablet 3    mupirocin (BACTROBAN) 2 % ointment Apply 1 Application topically to the appropriate area as directed 3 (Three) Times a Day. 30 g 3    rosuvastatin (CRESTOR) 5 MG tablet Take 1 tablet by mouth Daily. 90 tablet 3    Synthroid 50 MCG tablet TAKE 1 TABLET EVERY DAY 90 tablet 3     No facility-administered medications prior to visit.     No opioid medication identified on active medication list. I have reviewed chart for other potential  high risk medication/s and harmful drug interactions in the elderly.      Aspirin is not on active medication list.  Aspirin use is not indicated based on review of current medical condition/s. Risk of harm outweighs potential benefits.  .    Patient Active Problem List   Diagnosis     "Atopic rhinitis    Osteoarthritis    Hyperlipidemia    Hypothyroidism    Leukopenia    Depressed mood    Essential hypertension    Mass of right lower extremity    Venous insufficiency    Chronic bilateral low back pain without sciatica     Advance Care Planning Advance Directive is on file.  ACP discussion was held with the patient during this visit. Patient has an advance directive in EMR which is still valid.             Objective   Vitals:    25 1351   BP: 130/70   BP Location: Left arm   Patient Position: Sitting   Cuff Size: Adult   Pulse: 66   Resp: 12   TempSrc: Oral   SpO2: 97%   Weight: 53.7 kg (118 lb 6.4 oz)   Height: 154.9 cm (61\")   PainSc: 0-No pain       Estimated body mass index is 22.37 kg/m² as calculated from the following:    Height as of this encounter: 154.9 cm (61\").    Weight as of this encounter: 53.7 kg (118 lb 6.4 oz).    BMI is within normal parameters. No other follow-up for BMI required.           Does the patient have evidence of cognitive impairment? No  Lab Results   Component Value Date    CHLPL 186 2025    TRIG 33 2025    HDL 93 (H) 2025    LDL 86 2025    VLDL 7 2025                                                                                                Health  Risk Assessment    Smoking Status:  Social History     Tobacco Use   Smoking Status Former    Current packs/day: 0.00    Average packs/day: 0.3 packs/day for 35.0 years (8.8 ttl pk-yrs)    Types: Cigarettes    Start date:     Quit date:     Years since quittin.2   Smokeless Tobacco Never     Alcohol Consumption:  Social History     Substance and Sexual Activity   Alcohol Use Yes    Alcohol/week: 7.0 standard drinks of alcohol    Types: 7 Glasses of wine per week    Comment: caffeine use       Fall Risk Screen  STEADI Fall Risk Assessment was completed, and patient is at LOW risk for falls.Assessment completed on:3/11/2025    Depression Screening   Little interest or " pleasure in doing things? Not at all   Feeling down, depressed, or hopeless? Not at all   PHQ-2 Total Score 0      Health Habits and Functional and Cognitive Screening:      3/11/2025     1:49 PM   Functional & Cognitive Status   Do you have difficulty preparing food and eating? No   Do you have difficulty bathing yourself, getting dressed or grooming yourself? No   Do you have difficulty using the toilet? No   Do you have difficulty moving around from place to place? No   Do you have trouble with steps or getting out of a bed or a chair? No   Current Diet Well Balanced Diet   Dental Exam Up to date   Eye Exam Up to date   Exercise (times per week) 3 times per week   Current Exercises Include Walking;Swimming   Do you need help using the phone?  No   Are you deaf or do you have serious difficulty hearing?  No   Do you need help to go to places out of walking distance? No   Do you need help shopping? No   Do you need help preparing meals?  No   Do you need help with housework?  No   Do you need help with laundry? No   Do you need help taking your medications? No   Do you need help managing money? No   Do you ever drive or ride in a car without wearing a seat belt? No   Have you felt unusual stress, anger or loneliness in the last month? No   Who do you live with? Spouse   If you need help, do you have trouble finding someone available to you? No   Have you been bothered in the last four weeks by sexual problems? No   Do you have difficulty concentrating, remembering or making decisions? No           Age-appropriate Screening Schedule:  Refer to the list below for future screening recommendations based on patient's age, sex and/or medical conditions. Orders for these recommended tests are listed in the plan section. The patient has been provided with a written plan.    Health Maintenance List  Health Maintenance   Topic Date Due    LIPID PANEL  03/07/2026    ANNUAL WELLNESS VISIT  03/11/2026    DXA SCAN  09/19/2026     "TDAP/TD VACCINES (4 - Td or Tdap) 10/14/2034    COVID-19 Vaccine  Completed    RSV Vaccine - Adults  Completed    INFLUENZA VACCINE  Completed    Pneumococcal Vaccine 50+  Completed    ZOSTER VACCINE  Completed    MAMMOGRAM  Discontinued    COLORECTAL CANCER SCREENING  Discontinued                                                                                                                                                CMS Preventative Services Quick Reference  Risk Factors Identified During Encounter  utd    The above risks/problems have been discussed with the patient.  Pertinent information has been shared with the patient in the After Visit Summary.  An After Visit Summary and PPPS were made available to the patient.    Follow Up:   Next Medicare Wellness visit to be scheduled in 1 year.         Additional E&M Note during same encounter follows:  Patient has additional, significant, and separately identifiable condition(s)/problem(s) that require work above and beyond the Medicare Wellness Visit     Chief Complaint  Medicare Wellness-subsequent  Hyperlipidemia  Hypothyroidism  Hypertension  Osteopenia    Subjective   HPI  Johana is also being seen today for an annual adult preventative physical exam.  and Johana is also being seen today for additional medical problem/s. Patient is doien well today. She has reduced some obligations/ boards. This has allowed her more time to stay w friend in the hospital. She has medically managed htn. Lipids are well at goal.   She has osteopenia. Some wt bearing and ca, vit d  Euthyroid .  Recent cut left leg.                   Objective   Vital Signs:  /70 (BP Location: Left arm, Patient Position: Sitting, Cuff Size: Adult)   Pulse 66   Resp 12   Ht 154.9 cm (61\")   Wt 53.7 kg (118 lb 6.4 oz)   SpO2 97%   BMI 22.37 kg/m²   Physical Exam  Vitals and nursing note reviewed.   Constitutional:       Appearance: Normal appearance. She is well-developed.   HENT:      " Head: Normocephalic and atraumatic.      Right Ear: Tympanic membrane and external ear normal.      Left Ear: Tympanic membrane and external ear normal.      Nose: Nose normal.      Mouth/Throat:      Mouth: Mucous membranes are moist.   Eyes:      Extraocular Movements: Extraocular movements intact.      Pupils: Pupils are equal, round, and reactive to light.   Cardiovascular:      Rate and Rhythm: Normal rate and regular rhythm.      Pulses: Normal pulses.      Heart sounds: Normal heart sounds.   Pulmonary:      Effort: Pulmonary effort is normal. No respiratory distress.      Breath sounds: Normal breath sounds.   Abdominal:      General: Abdomen is flat.      Palpations: Abdomen is soft.   Musculoskeletal:         General: Normal range of motion.      Cervical back: Normal range of motion and neck supple.   Skin:     General: Skin is warm and dry.   Neurological:      General: No focal deficit present.      Mental Status: She is alert and oriented to person, place, and time.   Psychiatric:         Mood and Affect: Mood normal.         Behavior: Behavior normal.         Thought Content: Thought content normal.         Judgment: Judgment normal.         The following data was reviewed by: Sola Gómez MD on 03/11/2025:  Data reviewed : Consultant notes physical therapy  Common labs          7/10/2024    10:41 9/17/2024    11:02 3/7/2025    09:14   Common Labs   Glucose 89  92  87    BUN 13  37  20    Creatinine 0.83  1.06  0.86    Sodium 135  134  139    Potassium 3.7  3.5  3.8    Chloride 96  96  104    Calcium 10.6  10.6  9.9    Albumin 4.4   3.8    Total Bilirubin 0.4   0.3    Alkaline Phosphatase 56   56    AST (SGOT) 26   28    ALT (SGPT) 18   18    WBC 4.43   4.49    Hemoglobin 14.2   14.6    Hematocrit 41.8   44.4    Platelets 211   214    Total Cholesterol   186    Triglycerides   33    HDL Cholesterol   93    LDL Cholesterol    86           Assessment and Plan Additional age appropriate preventative  wellness advice topics were discussed during today's preventative wellness exam(some topics already addressed during AWV portion of the note above):    Physical Activity: Advised cardiovascular activity 150 minutes per week as tolerated. (example brisk walk for 30 minutes, 5 days a week).     Nutrition: Discussed nutrition plan with patient. Information shared in after visit summary. Goal is for a well balanced diet to enhance overall health.     Injury Prevention Discussion:  Information shared in after visit summary.           Need for vaccination    Orders:    COVID-19 (Pfizer) 12yrs+ (COMIRNATY)    Healthcare maintenance         Hyperlipidemia, unspecified hyperlipidemia type            Hypothyroidism, unspecified type    Orders:    TSH    T4, free    Essential hypertension           Primary osteoarthritis of both knees         Encounter for screening for vascular disease    Orders:    Vascular Screening (Bundle) CAR; Future    Non-seasonal allergic rhinitis, unspecified trigger  Take flonase and astelin daily. Prn ipratroprium trigger avoidance.   - htn- patient to continue losartan. Montior bp adjust if needed  -hyperlip- crestor qd  - hypothyroid- cotiue synthroid. Monitor tft  -oa-encouraged healthy movement. Continue chair aerobics and try water based fitness.   -osteopenia- vit d, dietary calcium, wt bearing exercise. Repeat DEXA 2026         Osteopenia of multiple sites               I spent 40 minutes caring for Johana on this date of service. This time includes time spent by me in the following activities:preparing for the visit, reviewing tests, obtaining and/or reviewing a separately obtained history, performing a medically appropriate examination and/or evaluation , counseling and educating the patient/family/caregiver, ordering medications, tests, or procedures, referring and communicating with other health care professionals , documenting information in the medical record, and independently  interpreting results and communicating that information with the patient/family/caregiver  Follow Up   Return in about 6 months (around 9/11/2025) for Recheck.  Patient was given instructions and counseling regarding her condition or for health maintenance advice. Please see specific information pulled into the AVS if appropriate.

## 2025-03-11 NOTE — LETTER
[facility]  Vaccine Consent Form    Patient Name:  Johana Corrigan  Patient :  1940     Vaccine(s) Ordered    COVID-19 (Pfizer) 12yrs+ (COMIRNATY)        Screening Checklist  The following questions should be completed prior to vaccination. If you answer “yes” to any question, it does not necessarily mean you should not be vaccinated. It just means we may need to clarify or ask more questions. If a question is unclear, please ask your healthcare provider to explain it.    Yes No   Any fever or moderate to severe illness today (mild illness and/or antibiotic treatment are not contraindications)?     Do you have a history of a serious reaction to any previous vaccinations, such as anaphylaxis, encephalopathy within 7 days, Guillain-Trenton syndrome within 6 weeks, seizure?     Have you received any live vaccine(s) (e.g MMR, CHERIE) or any other vaccines in the last month (to ensure duplicate doses aren't given)?     Do you have an anaphylactic allergy to latex (DTaP, DTaP-IPV, Hep A, Hep B, MenB, RV, Td, Tdap), baker’s yeast (Hep B, HPV), polysorbates (RSV, nirsevimab, PCV 20, Rotavirrus, Tdap, Shingrix), or gelatin (CHERIE, MMR)?     Do you have an anaphylactic allergy to neomycin (Rabies, CHERIE, MMR, IPV, Hep A), polymyxin B (IPV), or streptomycin (IPV)?      Any cancer, leukemia, AIDS, or other immune system disorder? (CHERIE, MMR, RV)     Do you have a parent, brother, or sister with an immune system problem (if immune competence of vaccine recipient clinically verified, can proceed)? (MMR, CHERIE)     Any recent steroid treatments for >2 weeks, chemotherapy, or radiation treatment? (CHERIE, MMR)     Have you received antibody-containing blood transfusions or IVIG in the past 11 months (recommended interval is dependent on product)? (MMR, CHERIE)     Have you taken antiviral drugs (acyclovir, famciclovir, valacyclovir for CHERIE) in the last 24 or 48 hours, respectively?      Are you pregnant or planning to become pregnant  "within 1 month? (CHERIE, MMR, HPV, IPV, MenB, Abrexvy; For Hep B- refer to Engerix-B; For RSV - Abrysvo is indicated for 32-36 weeks of pregnancy from September to January)     For infants, have you ever been told your child has had intussusception or a medical emergency involving obstruction of the intestine (Rotavirus)? If not for an infant, can skip this question.         *Ordering Physicians/APC should be consulted if \"yes\" is checked by the patient or guardian above.  I have received, read, and understand the Vaccine Information Statement (VIS) for each vaccine ordered.  I have considered my or my child's health status as well as the health status of my close contacts.  I have taken the opportunity to discuss my vaccine questions with my or my child's health care provider.   I have requested that the ordered vaccine(s) be given to me or my child.  I understand the benefits and risks of the vaccines.  I understand that I should remain in the clinic for 15 minutes after receiving the vaccine(s).  _________________________________________________________  Signature of Patient or Parent/Legal Guardian ____________________  Date   "

## 2025-03-11 NOTE — ASSESSMENT & PLAN NOTE
Take flonase and astelin daily. Prn ipratroprium trigger avoidance.   - htn- patient to continue losartan. Montior bp adjust if needed  -hyperlip- crestor qd  - hypothyroid- cotiue synthroid. Monitor tft  -oa-encouraged healthy movement. Continue chair aerobics and try water based fitness.   -osteopenia- vit d, dietary calcium, wt bearing exercise. Repeat DEXA 2026

## 2025-03-21 RX ORDER — HYDROCHLOROTHIAZIDE 12.5 MG/1
12.5 TABLET ORAL DAILY
Qty: 90 TABLET | Refills: 3 | OUTPATIENT
Start: 2025-03-21

## 2025-04-01 ENCOUNTER — TELEPHONE (OUTPATIENT)
Dept: INTERNAL MEDICINE | Facility: CLINIC | Age: 85
End: 2025-04-01

## 2025-04-01 NOTE — TELEPHONE ENCOUNTER
Caller: Johana Corrigan    Relationship: Self    Best call back number: [phone]     What was the call regarding: PATIENT STATES SHE SEES HER CHOLESTEROL IS HIGH. PATIENT IS NOT SURE IF DR ESCUDERO WANTS HER ANOTHER PRESCRIPTIONS.    BERT CALL AND ADVISE

## 2025-04-08 LAB
T4 FREE SERPL-MCNC: 1.74 NG/DL (ref 0.92–1.68)
TSH SERPL DL<=0.005 MIU/L-ACNC: 3.63 UIU/ML (ref 0.27–4.2)

## 2025-04-09 RX ORDER — LOSARTAN POTASSIUM 25 MG/1
25 TABLET ORAL DAILY
Qty: 90 TABLET | Refills: 3 | Status: SHIPPED | OUTPATIENT
Start: 2025-04-09

## 2025-04-11 RX ORDER — ROSUVASTATIN CALCIUM 5 MG/1
5 TABLET, COATED ORAL DAILY
Qty: 90 TABLET | Refills: 3 | Status: SHIPPED | OUTPATIENT
Start: 2025-04-11

## 2025-04-15 ENCOUNTER — HOSPITAL ENCOUNTER (OUTPATIENT)
Dept: CARDIOLOGY | Facility: HOSPITAL | Age: 85
Discharge: HOME | End: 2025-04-15
Payer: MEDICARE

## 2025-04-15 ENCOUNTER — OFFICE VISIT (OUTPATIENT)
Dept: INTERNAL MEDICINE | Facility: CLINIC | Age: 85
End: 2025-04-15
Payer: MEDICARE

## 2025-04-15 VITALS
OXYGEN SATURATION: 98 % | BODY MASS INDEX: 20.77 KG/M2 | DIASTOLIC BLOOD PRESSURE: 80 MMHG | WEIGHT: 110 LBS | HEART RATE: 65 BPM | SYSTOLIC BLOOD PRESSURE: 152 MMHG | HEIGHT: 61 IN | RESPIRATION RATE: 18 BRPM

## 2025-04-15 VITALS
BODY MASS INDEX: 22.38 KG/M2 | HEIGHT: 61 IN | SYSTOLIC BLOOD PRESSURE: 140 MMHG | OXYGEN SATURATION: 98 % | DIASTOLIC BLOOD PRESSURE: 72 MMHG | HEART RATE: 75 BPM | TEMPERATURE: 97.6 F

## 2025-04-15 DIAGNOSIS — R06.02 SHORTNESS OF BREATH: ICD-10-CM

## 2025-04-15 DIAGNOSIS — R07.9 CHEST PAIN, UNSPECIFIED TYPE: Primary | ICD-10-CM

## 2025-04-15 DIAGNOSIS — R06.02 SHORTNESS OF BREATH: Primary | ICD-10-CM

## 2025-04-15 DIAGNOSIS — R07.2 PRECORDIAL CHEST PAIN: ICD-10-CM

## 2025-04-15 DIAGNOSIS — R07.89 CHEST TIGHTNESS: ICD-10-CM

## 2025-04-15 LAB
ALBUMIN SERPL-MCNC: 4.4 G/DL (ref 3.5–5.2)
ALBUMIN/GLOB SERPL: 1.6 G/DL
ALP SERPL-CCNC: 61 U/L (ref 39–117)
ALT SERPL W P-5'-P-CCNC: 29 U/L (ref 1–33)
ANION GAP SERPL CALCULATED.3IONS-SCNC: 13.1 MMOL/L (ref 5–15)
AORTIC ARCH: 2.6 CM
AORTIC DIMENSIONLESS INDEX: 0.76 (DI)
ASCENDING AORTA: 2.8 CM
AST SERPL-CCNC: 33 U/L (ref 1–32)
AV MEAN PRESS GRAD SYS DOP V1V2: 2 MMHG
AV VMAX SYS DOP: 101 CM/SEC
BASOPHILS # BLD AUTO: 0.05 10*3/MM3 (ref 0–0.2)
BASOPHILS NFR BLD AUTO: 0.9 % (ref 0–1.5)
BH CV ECHO MEAS - ACS: 2.21 CM
BH CV ECHO MEAS - AO MAX PG: 4.1 MMHG
BH CV ECHO MEAS - AO ROOT AREA (BSA CORRECTED): 2.2 CM2
BH CV ECHO MEAS - AO ROOT DIAM: 3.3 CM
BH CV ECHO MEAS - AO V2 VTI: 25.2 CM
BH CV ECHO MEAS - AVA(I,D): 2.04 CM2
BH CV ECHO MEAS - EDV(CUBED): 54.6 ML
BH CV ECHO MEAS - EDV(MOD-SP2): 50 ML
BH CV ECHO MEAS - EDV(MOD-SP4): 43 ML
BH CV ECHO MEAS - EF(MOD-SP2): 54 %
BH CV ECHO MEAS - EF(MOD-SP4): 72.1 %
BH CV ECHO MEAS - ESV(CUBED): 13.2 ML
BH CV ECHO MEAS - ESV(MOD-SP2): 23 ML
BH CV ECHO MEAS - ESV(MOD-SP4): 12 ML
BH CV ECHO MEAS - FS: 37.7 %
BH CV ECHO MEAS - IVS/LVPW: 1.18 CM
BH CV ECHO MEAS - IVSD: 1.3 CM
BH CV ECHO MEAS - LA 3D VOL INDEX: 25
BH CV ECHO MEAS - LAT PEAK E' VEL: 10 CM/SEC
BH CV ECHO MEAS - LV DIASTOLIC VOL/BSA (35-75): 29.3 CM2
BH CV ECHO MEAS - LV MASS(C)D: 153.4 GRAMS
BH CV ECHO MEAS - LV MAX PG: 2.9 MMHG
BH CV ECHO MEAS - LV MEAN PG: 1 MMHG
BH CV ECHO MEAS - LV SYSTOLIC VOL/BSA (12-30): 8.2 CM2
BH CV ECHO MEAS - LV V1 MAX: 85.6 CM/SEC
BH CV ECHO MEAS - LV V1 VTI: 19.2 CM
BH CV ECHO MEAS - LVIDD: 3.8 CM
BH CV ECHO MEAS - LVIDS: 2.36 CM
BH CV ECHO MEAS - LVOT AREA: 2.7 CM2
BH CV ECHO MEAS - LVOT DIAM: 1.84 CM
BH CV ECHO MEAS - LVPWD: 1.11 CM
BH CV ECHO MEAS - MED PEAK E' VEL: 6.5 CM/SEC
BH CV ECHO MEAS - MV A DUR: 0.12 SEC
BH CV ECHO MEAS - MV A MAX VEL: 86.9 CM/SEC
BH CV ECHO MEAS - MV DEC SLOPE: 528 CM/SEC2
BH CV ECHO MEAS - MV DEC TIME: 0.18 SEC
BH CV ECHO MEAS - MV E MAX VEL: 100 CM/SEC
BH CV ECHO MEAS - MV E/A: 1.15
BH CV ECHO MEAS - MV MAX PG: 4.6 MMHG
BH CV ECHO MEAS - MV MEAN PG: 2.07 MMHG
BH CV ECHO MEAS - MV P1/2T: 56.4 MSEC
BH CV ECHO MEAS - MV V2 VTI: 34.6 CM
BH CV ECHO MEAS - MVA(P1/2T): 3.9 CM2
BH CV ECHO MEAS - MVA(VTI): 1.48 CM2
BH CV ECHO MEAS - PA ACC TIME: 0.1 SEC
BH CV ECHO MEAS - PA V2 MAX: 81.6 CM/SEC
BH CV ECHO MEAS - PULM A REVS DUR: 0.12 SEC
BH CV ECHO MEAS - PULM A REVS VEL: 30.4 CM/SEC
BH CV ECHO MEAS - PULM DIAS VEL: 36.7 CM/SEC
BH CV ECHO MEAS - PULM S/D: 1.34
BH CV ECHO MEAS - PULM SYS VEL: 49.3 CM/SEC
BH CV ECHO MEAS - QP/QS: 0.47
BH CV ECHO MEAS - RAP SYSTOLE: 3 MMHG
BH CV ECHO MEAS - RV MAX PG: 0.97 MMHG
BH CV ECHO MEAS - RV V1 MAX: 49.3 CM/SEC
BH CV ECHO MEAS - RV V1 VTI: 11.9 CM
BH CV ECHO MEAS - RVOT DIAM: 1.6 CM
BH CV ECHO MEAS - RVSP: 22 MMHG
BH CV ECHO MEAS - SUP REN AO DIAM: 1.5 CM
BH CV ECHO MEAS - SV(LVOT): 51.3 ML
BH CV ECHO MEAS - SV(MOD-SP2): 27 ML
BH CV ECHO MEAS - SV(MOD-SP4): 31 ML
BH CV ECHO MEAS - SV(RVOT): 24 ML
BH CV ECHO MEAS - SVI(LVOT): 35 ML/M2
BH CV ECHO MEAS - SVI(MOD-SP2): 18.4 ML/M2
BH CV ECHO MEAS - SVI(MOD-SP4): 21.2 ML/M2
BH CV ECHO MEAS - TAPSE (>1.6): 1.84 CM
BH CV ECHO MEAS - TR MAX PG: 18.6 MMHG
BH CV ECHO MEAS - TR MAX VEL: 215.6 CM/SEC
BH CV ECHO MEASUREMENTS AVERAGE E/E' RATIO: 12.12
BH CV XLRA - RV BASE: 2.8 CM
BH CV XLRA - RV LENGTH: 6.5 CM
BH CV XLRA - RV MID: 2.45 CM
BH CV XLRA - TDI S': 11.4 CM/SEC
BILIRUB SERPL-MCNC: 0.4 MG/DL (ref 0–1.2)
BUN SERPL-MCNC: 21 MG/DL (ref 8–23)
BUN/CREAT SERPL: 23.9 (ref 7–25)
CALCIUM SPEC-SCNC: 10.9 MG/DL (ref 8.6–10.5)
CHLORIDE SERPL-SCNC: 100 MMOL/L (ref 98–107)
CO2 SERPL-SCNC: 25.9 MMOL/L (ref 22–29)
CREAT SERPL-MCNC: 0.88 MG/DL (ref 0.57–1)
D DIMER PPP FEU-MCNC: 0.3 MCGFEU/ML (ref 0–0.84)
DEPRECATED RDW RBC AUTO: 42.1 FL (ref 37–54)
EGFRCR SERPLBLD CKD-EPI 2021: 64.9 ML/MIN/1.73
EOSINOPHIL # BLD AUTO: 0.08 10*3/MM3 (ref 0–0.4)
EOSINOPHIL NFR BLD AUTO: 1.4 % (ref 0.3–6.2)
ERYTHROCYTE [DISTWIDTH] IN BLOOD BY AUTOMATED COUNT: 12.5 % (ref 12.3–15.4)
EXPIRATION DATE: NORMAL
GEN 5 1HR TROPONIN T REFLEX: 13 NG/L
GLOBULIN UR ELPH-MCNC: 2.8 GM/DL
GLUCOSE SERPL-MCNC: 89 MG/DL (ref 65–99)
HCT VFR BLD AUTO: 43.9 % (ref 34–46.6)
HGB BLD-MCNC: 15 G/DL (ref 12–15.9)
IMM GRANULOCYTES # BLD AUTO: 0.02 10*3/MM3 (ref 0–0.05)
IMM GRANULOCYTES NFR BLD AUTO: 0.3 % (ref 0–0.5)
INTERNAL CONTROL: NORMAL
LEFT ATRIUM VOLUME INDEX: 17.1 ML/M2
LV EF 3D SEGMENTATION: 68 %
LV EF BIPLANE MOD: 65.3 %
LYMPHOCYTES # BLD AUTO: 1.73 10*3/MM3 (ref 0.7–3.1)
LYMPHOCYTES NFR BLD AUTO: 30.1 % (ref 19.6–45.3)
Lab: NORMAL
MCH RBC QN AUTO: 31.5 PG (ref 26.6–33)
MCHC RBC AUTO-ENTMCNC: 34.2 G/DL (ref 31.5–35.7)
MCV RBC AUTO: 92.2 FL (ref 79–97)
MONOCYTES # BLD AUTO: 0.42 10*3/MM3 (ref 0.1–0.9)
MONOCYTES NFR BLD AUTO: 7.3 % (ref 5–12)
NEUTROPHILS NFR BLD AUTO: 3.44 10*3/MM3 (ref 1.7–7)
NEUTROPHILS NFR BLD AUTO: 60 % (ref 42.7–76)
NRBC BLD AUTO-RTO: 0 /100 WBC (ref 0–0.2)
NT-PROBNP SERPL-MCNC: 117 PG/ML (ref 0–1800)
PLATELET # BLD AUTO: 201 10*3/MM3 (ref 140–450)
PMV BLD AUTO: 9.8 FL (ref 6–12)
POTASSIUM SERPL-SCNC: 3.9 MMOL/L (ref 3.5–5.2)
PROT SERPL-MCNC: 7.2 G/DL (ref 6–8.5)
RBC # BLD AUTO: 4.76 10*6/MM3 (ref 3.77–5.28)
SARS-COV-2 AG UPPER RESP QL IA.RAPID: NOT DETECTED
SINUS: 3 CM
SODIUM SERPL-SCNC: 139 MMOL/L (ref 136–145)
STJ: 2.42 CM
TROPONIN T % DELTA: -7
TROPONIN T NUMERIC DELTA: -1 NG/L
TROPONIN T SERPL HS-MCNC: 14 NG/L
WBC NRBC COR # BLD AUTO: 5.74 10*3/MM3 (ref 3.4–10.8)

## 2025-04-15 PROCEDURE — 99214 OFFICE O/P EST MOD 30 MIN: CPT | Performed by: INTERNAL MEDICINE

## 2025-04-15 PROCEDURE — 85379 FIBRIN DEGRADATION QUANT: CPT | Performed by: INTERNAL MEDICINE

## 2025-04-15 PROCEDURE — 84484 ASSAY OF TROPONIN QUANT: CPT | Performed by: INTERNAL MEDICINE

## 2025-04-15 PROCEDURE — 80053 COMPREHEN METABOLIC PANEL: CPT

## 2025-04-15 PROCEDURE — 83880 ASSAY OF NATRIURETIC PEPTIDE: CPT | Performed by: INTERNAL MEDICINE

## 2025-04-15 PROCEDURE — 93306 TTE W/DOPPLER COMPLETE: CPT

## 2025-04-15 PROCEDURE — 85025 COMPLETE CBC W/AUTO DIFF WBC: CPT

## 2025-04-15 PROCEDURE — 3077F SYST BP >= 140 MM HG: CPT | Performed by: INTERNAL MEDICINE

## 2025-04-15 PROCEDURE — 1159F MED LIST DOCD IN RCRD: CPT | Performed by: INTERNAL MEDICINE

## 2025-04-15 PROCEDURE — 87426 SARSCOV CORONAVIRUS AG IA: CPT | Performed by: INTERNAL MEDICINE

## 2025-04-15 PROCEDURE — 1126F AMNT PAIN NOTED NONE PRSNT: CPT | Performed by: INTERNAL MEDICINE

## 2025-04-15 PROCEDURE — 36415 COLL VENOUS BLD VENIPUNCTURE: CPT

## 2025-04-15 PROCEDURE — 93005 ELECTROCARDIOGRAM TRACING: CPT | Performed by: INTERNAL MEDICINE

## 2025-04-15 PROCEDURE — 93000 ELECTROCARDIOGRAM COMPLETE: CPT | Performed by: INTERNAL MEDICINE

## 2025-04-15 PROCEDURE — 1160F RVW MEDS BY RX/DR IN RCRD: CPT | Performed by: INTERNAL MEDICINE

## 2025-04-15 PROCEDURE — 3078F DIAST BP <80 MM HG: CPT | Performed by: INTERNAL MEDICINE

## 2025-04-15 PROCEDURE — 94760 N-INVAS EAR/PLS OXIMETRY 1: CPT

## 2025-04-15 PROCEDURE — 93306 TTE W/DOPPLER COMPLETE: CPT | Performed by: INTERNAL MEDICINE

## 2025-04-15 RX ORDER — NITROGLYCERIN 0.4 MG/1
0.4 TABLET SUBLINGUAL
Status: SHIPPED | OUTPATIENT
Start: 2025-04-15

## 2025-04-15 RX ORDER — SODIUM CHLORIDE 0.9 % (FLUSH) 0.9 %
10 SYRINGE (ML) INJECTION AS NEEDED
Status: SHIPPED | OUTPATIENT
Start: 2025-04-15

## 2025-04-15 NOTE — PROGRESS NOTES
"PATIENTINFORMATION    Date of Office Visit: 04/15/2025  Encounter Provider: KARISHMA CPU  Place of Service: [facility]  Patient Name: Johana Corrigan  : 1940    Subjective:     Encounter Date:04/15/2025      Patient ID: Johana Corrigan is a 84 y.o. female.    Chief Complaint   Patient presents with    Chest Pain    Shortness of Breath       HPI  Ms. Corrigan is a pleasant 85 yo lady who [place] [facility] [place]mez's office for evaluation of chest tightness.  She just returned from [facility] yesterday and she had to take a long road trip of about 12 hours back-and-forth.  She stayed there for about 2 weeks.  She started to feel \"not right\" for the past 2 days 3 days.  She reports some shortness of breath/chest rattling/sounds/breathing and some chest tightness.  More symptoms with exertion.  She denies chest ache/squeeze.  No orthopnea, PND, palpitations, presyncope syncope or extremity swelling.  No prior known coronary artery disease.      ROS  All systems reviewed and negative except as noted in HPI.    Past Medical History:   Diagnosis Date    Allergic     Arthritis     Cataract     Dyspnea     Headache     History of anemia     Hyperlipidemia     Hypertension     Hyperthyroidism     Leukopenia     Osteoarthritis        Past Surgical History:   Procedure Laterality Date    BRONCHOSCOPY N/A 2018    Procedure: BRONCHOSCOPY WITH;  Surgeon: Gee Kenney MD;  Location: [facility] ENDOSCOPY;  Service: Pulmonary    CATARACT EXTRACTION Bilateral     COLONOSCOPY      EXCISION MASS LEG Right 2021    Procedure: LOWER EXTREMITY EXCISION LESION/CYST;  Surgeon: Anatoly Jones MD;  Location: [facility] OR [facility] [place];  Service: General;  Laterality: Right;    FOOT SURGERY Left     bone spur    KNEE ASPIRATION Right 2021    rt knee mass    NOSE SURGERY      SUBMANDIBULAR GLAND EXCISION      TONSILLECTOMY      TUBAL ABDOMINAL LIGATION         Social History     Socioeconomic History    " "Marital status:    Tobacco Use    Smoking status: Former     Current packs/day: 0.00     Average packs/day: 0.3 packs/day for 35.0 years (8.8 ttl pk-yrs)     Types: Cigarettes     Start date:      Quit date:      Years since quittin.3    Smokeless tobacco: Never   Vaping Use    Vaping status: Never Used   Substance and Sexual Activity    Alcohol use: Yes     Alcohol/week: 7.0 standard drinks of alcohol     Types: 7 Glasses of wine per week     Comment: caffeine use    Drug use: Never    Sexual activity: Defer       Family History   Problem Relation Age of Onset    Heart disease Father     Heart disease Brother     Heart disease Mother     Malig Hyperthermia Neg Hx     Breast cancer Neg Hx     Ovarian cancer Neg Hx            ECG 12 Lead    Date/Time: 4/15/2025 1:36 PM  Performed by: Julian Camargo MD    Authorized by: Julian Camargo MD  Comparison: compared with previous ECG from 4/15/2025  Similar to previous ECG  Rhythm: sinus rhythm  Rate: normal  Conduction: right bundle branch block  ST Segments: ST segments normal  T Waves: T waves normal  QRS axis: normal  Other: no other findings    Clinical impression: abnormal EKG             Objective:     /80 (BP Location: Right arm, Patient Position: Sitting)   Pulse 65   Resp 18   Ht 154.9 cm (61\")   Wt 49.9 kg (110 lb)   SpO2 98%   BMI 20.78 kg/m²  Body mass index is 20.78 kg/m².     Constitutional:       General: Not in acute distress.     Appearance: Well-developed. Not diaphoretic.   Eyes:      Pupils: Pupils are equal, round, and reactive to light.   HENT:      Head: Normocephalic and atraumatic.   Neck:      Thyroid: No thyromegaly.   Pulmonary:      Effort: Pulmonary effort is normal. No respiratory distress.      Breath sounds: Normal breath sounds. No wheezing. No rales.   Chest:      Chest wall: Not tender to palpatation.   Cardiovascular:      Normal rate. Regular rhythm.      No gallop.    Pulses:     Intact " distal pulses.   Edema:     Peripheral edema absent.   Abdominal:      General: Bowel sounds are normal. There is no distension.      Palpations: Abdomen is soft.      Tenderness: There is no guarding.   Musculoskeletal: Normal range of motion.         General: No deformity.      Cervical back: Normal range of motion and neck supple. Skin:     General: Skin is warm and dry.      Findings: No rash.   Neurological:      Mental Status: Alert and oriented to person, place, and time.      Cranial Nerves: No cranial nerve deficit.      Deep Tendon Reflexes: Reflexes are normal and symmetric.   Psychiatric:         Judgment: Judgment normal.         Review Of Data: I have reviewed pertinent recent labs, images and documents and pertinent findings included in HPI or assessment below.    Lipid Panel          3/7/2025    09:14   Lipid Panel   Total Cholesterol 186    Triglycerides 33    HDL Cholesterol 93    VLDL Cholesterol 7    LDL Cholesterol  86          Assessment/Plan:     Chest tightness/shortness of breath/wheezing of 2 days 3 days-return at from [place] on a road trip yesterday.  No extremity swelling.  No pruritic chest pain.  Essential hypertension  Hypercholesterolemia  Mild coronary artery calcification  Mild mitral regurgitation  Family history of coronary artery disease    Chest pain-free during exam.  EKG with old right bundle branch block which is unchanged.  No changes on serial EKG.  Troponin borderline elevated.  No significant delta on repeat test.  Negative D-dimer.  Echocardiogram with normal left ventricular ejection fraction.  Mild MR.  Normal RV size and function.  Continue current cardiac medications and statin.  Concerning symptoms: I will send her for Myocard perfusion study in AM.    Diagnosis and plan of care discussed with patient and verbalized understanding.            Your medication list        ASK your doctor about these medications        Instructions Last Dose Given Next Dose Due    azelastine 0.1 % nasal spray  Commonly known as: ASTELIN      Administer 2 sprays into the nostril(s) as directed by provider 2 (Two) Times a Day. Use in each nostril as directed       CALCIUM/D3 ADULT GUMMIES PO      Take 2 tablets by mouth Every Morning.       fluticasone 50 MCG/ACT nasal spray  Commonly known as: FLONASE      Administer 2 sprays into the nostril(s) as directed by provider Daily.       ipratropium 0.06 % nasal spray  Commonly known as: ATROVENT      Administer 2 sprays into the nostril(s) as directed by provider 3 (Three) Times a Day.       losartan 25 MG tablet  Commonly known as: COZAAR      TAKE 1 TABLET EVERY DAY       montelukast 10 MG tablet  Commonly known as: SINGULAIR      Take 1 tablet by mouth Every Night.       mupirocin 2 % ointment  Commonly known as: BACTROBAN      Apply 1 Application topically to the appropriate area as directed 3 (Three) Times a Day.       rosuvastatin 5 MG tablet  Commonly known as: CRESTOR      Take 1 tablet by mouth Daily.       Synthroid 50 MCG tablet  Generic drug: levothyroxine      TAKE 1 TABLET EVERY DAY                    Julian Camargo MD  04/15/25  13:31 EDT

## 2025-04-15 NOTE — PROGRESS NOTES
"Josselyn Corrigan is a 84 y.o. female who presents with   Chief Complaint   Patient presents with    Shortness of Breath       Cough  Associated symptoms include rhinorrhea and shortness of breath. Pertinent negatives include no chest pain, chills, fever or sore throat.   URI   Associated symptoms include rhinorrhea. Pertinent negatives include no chest pain, congestion, coughing or sore throat.        Feeling poorly for three days.  Chest feels tight.  Chest is \"rattleing\".   No cough.  SOA is associated.  No PND.  NO orthopnea.  No URI symptoms.  Just got back from long car trip.      Review of Systems   Constitutional:  Negative for chills and fever.   HENT:  Positive for rhinorrhea. Negative for congestion and sore throat.    Respiratory:  Positive for chest tightness and shortness of breath. Negative for cough.    Cardiovascular:  Negative for chest pain, palpitations and leg swelling.       The following portions of the patient's history were reviewed and updated as appropriate: allergies, current medications and problem list.    Patient Active Problem List    Diagnosis Date Noted    Chronic bilateral low back pain without sciatica 09/20/2024    Venous insufficiency 05/05/2022    Mass of right lower extremity 10/18/2021    Essential hypertension 10/07/2019    Depressed mood 04/07/2017    Atopic rhinitis 06/29/2016    Osteoarthritis 06/29/2016    Hyperlipidemia 06/29/2016    Hypothyroidism 06/29/2016    Leukopenia 06/29/2016       Current Outpatient Medications on File Prior to Visit   Medication Sig Dispense Refill    azelastine (ASTELIN) 0.1 % nasal spray Administer 2 sprays into the nostril(s) as directed by provider 2 (Two) Times a Day. Use in each nostril as directed 3 each 4    Calcium-Phosphorus-Vitamin D (CALCIUM/D3 ADULT GUMMIES PO) Take 2 tablets by mouth Every Morning.      fluticasone (FLONASE) 50 MCG/ACT nasal spray Administer 2 sprays into the nostril(s) as directed by provider Daily. " "16 g 5    ipratropium (ATROVENT) 0.06 % nasal spray Administer 2 sprays into the nostril(s) as directed by provider 3 (Three) Times a Day. 15 mL 12    losartan (COZAAR) 25 MG tablet TAKE 1 TABLET EVERY DAY 90 tablet 3    montelukast (SINGULAIR) 10 MG tablet Take 1 tablet by mouth Every Night. 90 tablet 3    mupirocin (BACTROBAN) 2 % ointment Apply 1 Application topically to the appropriate area as directed 3 (Three) Times a Day. 30 g 3    rosuvastatin (CRESTOR) 5 MG tablet Take 1 tablet by mouth Daily. 90 tablet 3    Synthroid 50 MCG tablet TAKE 1 TABLET EVERY DAY 90 tablet 3     No current facility-administered medications on file prior to visit.       Objective     /72   Pulse 75   Temp 97.6 °F (36.4 °C)   Ht 154.9 cm (60.98\")   SpO2 98%   BMI 22.38 kg/m²     Physical Exam  Constitutional:       Appearance: She is well-developed.   HENT:      Head: Normocephalic and atraumatic.      Right Ear: Hearing and tympanic membrane normal.      Left Ear: Hearing and tympanic membrane normal.      Mouth/Throat:      Pharynx: No oropharyngeal exudate or posterior oropharyngeal erythema.   Cardiovascular:      Rate and Rhythm: Normal rate and regular rhythm.      Heart sounds: Normal heart sounds.   Pulmonary:      Effort: Pulmonary effort is normal.      Breath sounds: Normal breath sounds.   Skin:     General: Skin is warm and dry.   Neurological:      Mental Status: She is alert and oriented to person, place, and time.   Psychiatric:         Behavior: Behavior normal.             ECG 12 Lead    Date/Time: 4/15/2025 10:10 AM  Performed by: Tawnya Batres MD    Authorized by: Tawnya Batres MD  Comparison: compared with previous ECG from 5/5/2022  Similar to previous ECG  Rhythm: sinus rhythm  Rate: normal  Conduction: right bundle branch block  ST Segments: ST segments normal  T Waves: T waves normal  QRS axis: normal    Clinical impression: non-specific ECG      X-rays of the chest  performed today for " following indication:   soa.  X-ray reveal nad.  There is no available x-ray for comparison.  X-ray sent to radiology for official interpretation and findings.        Assessment & Plan   Diagnoses and all orders for this visit:    1. Shortness of breath (Primary)  -     XR Chest PA & Lateral  -     POCT SARS-CoV-2 Antigen NATALIE    2. Chest tightness    Other orders  -     ECG 12 Lead        Discussion    Patient presents with new onset shortness of breath and chest tightness after long care trip to [facility].  No evidence of URI.  EKG and CXR unchanged.  I d/w [facility].  Patient is sent there for further evaluation.         Future Appointments   Date Time Provider Department Center   6/13/2025  8:00 AM [facility]   9/9/2025  9:10 AM [facility]   9/16/2025 10:30 AM Sola Gómez MD [facility]

## 2025-04-24 ENCOUNTER — TELEPHONE (OUTPATIENT)
Dept: CARDIOLOGY | Age: 85
End: 2025-04-24
Payer: MEDICARE

## 2025-04-25 ENCOUNTER — HOSPITAL ENCOUNTER (OUTPATIENT)
Dept: CARDIOLOGY | Facility: HOSPITAL | Age: 85
Discharge: HOME | End: 2025-04-25
Payer: MEDICARE

## 2025-04-25 VITALS — HEIGHT: 61 IN | BODY MASS INDEX: 21.23 KG/M2 | WEIGHT: 112.43 LBS

## 2025-04-25 DIAGNOSIS — R07.2 PRECORDIAL CHEST PAIN: ICD-10-CM

## 2025-04-25 LAB
BH CV NUCLEAR PRIOR STUDY: 2
BH CV REST NUCLEAR ISOTOPE DOSE: 13.2 MCI
BH CV STRESS COMMENTS STAGE 1: NORMAL
BH CV STRESS DOSE REGADENOSON STAGE 1: 0.4
BH CV STRESS DURATION MIN STAGE 1: 0
BH CV STRESS DURATION SEC STAGE 1: 10
BH CV STRESS NUCLEAR ISOTOPE DOSE: 13.2 MCI
BH CV STRESS PROTOCOL 1: NORMAL
BH CV STRESS RECOVERY BP: NORMAL MMHG
BH CV STRESS RECOVERY HR: 78 BPM
BH CV STRESS STAGE 1: 1
MAXIMAL PREDICTED HEART RATE: 136 BPM
PERCENT MAX PREDICTED HR: 63.97 %
SPECT HRT GATED+EF W RNC IV: 80 %
STRESS BASELINE BP: NORMAL MMHG
STRESS BASELINE HR: 72 BPM
STRESS O2 SAT REST: 96 %
STRESS PERCENT HR: 75 %
STRESS POST O2 SAT PEAK: 98 %
STRESS POST PEAK BP: NORMAL MMHG
STRESS POST PEAK HR: 87 BPM
STRESS TARGET HR: 116 BPM

## 2025-04-25 PROCEDURE — 93017 CV STRESS TEST TRACING ONLY: CPT

## 2025-04-25 PROCEDURE — 78492 MYOCRD IMG PET MLT RST&STRS: CPT

## 2025-04-25 PROCEDURE — 25010000002 REGADENOSON 0.4 MG/5ML SOLUTION: Performed by: INTERNAL MEDICINE

## 2025-04-25 PROCEDURE — 34310000005 RUBIDIUM CHLORIDE: Performed by: INTERNAL MEDICINE

## 2025-04-25 PROCEDURE — A9555 RB82 RUBIDIUM: HCPCS | Performed by: INTERNAL MEDICINE

## 2025-04-25 RX ORDER — REGADENOSON 0.08 MG/ML
0.4 INJECTION, SOLUTION INTRAVENOUS
Status: COMPLETED | OUTPATIENT
Start: 2025-04-25 | End: 2025-04-25

## 2025-04-25 RX ADMIN — REGADENOSON 0.4 MG: 0.08 INJECTION, SOLUTION INTRAVENOUS at 09:52

## 2025-04-27 ENCOUNTER — RESULTS FOLLOW-UP (OUTPATIENT)
Dept: CARDIOLOGY | Facility: HOSPITAL | Age: 85
End: 2025-04-27
Payer: MEDICARE

## 2025-04-27 NOTE — PROGRESS NOTES
Please notify Johana stress test does not show evidence for significant blockages of heart arteries.  FU in 1 year or sooner with concerns.  Thank you

## 2025-04-28 NOTE — TELEPHONE ENCOUNTER
Pt returned my call. Informed pt and verbally understood.    Pt will call tomorrow to make an appointment .    Thanks  Susie LEMOS   Surgeon Performing Repair (Optional): Mitchell

## 2025-05-09 RX ORDER — MONTELUKAST SODIUM 10 MG/1
10 TABLET ORAL NIGHTLY
Qty: 90 TABLET | Refills: 3 | Status: SHIPPED | OUTPATIENT
Start: 2025-05-09

## 2025-06-13 ENCOUNTER — HOSPITAL ENCOUNTER (OUTPATIENT)
Dept: CARDIOLOGY | Facility: HOSPITAL | Age: 85
Discharge: HOME | End: 2025-06-13

## 2025-06-13 DIAGNOSIS — Z13.6 ENCOUNTER FOR SCREENING FOR VASCULAR DISEASE: ICD-10-CM

## 2025-06-13 LAB
BH CV VAS SCREENING CAROTID CCA LEFT: 93 CM/SEC
BH CV VAS SCREENING CAROTID CCA RIGHT: 100 CM/SEC
BH CV VAS SCREENING CAROTID ICA LEFT: 114 CM/SEC
BH CV VAS SCREENING CAROTID ICA RIGHT: 83 CM/SEC
BH CV XLRA MEAS - MID AO DIAM: 1.6 CM
BH CV XLRA MEAS - PAD LEFT ABI PT: 1.06
BH CV XLRA MEAS - PAD LEFT ARM: 141 MMHG
BH CV XLRA MEAS - PAD LEFT LEG PT: 149 MMHG
BH CV XLRA MEAS - PAD RIGHT ABI PT: 1.01
BH CV XLRA MEAS - PAD RIGHT ARM: 141 MMHG
BH CV XLRA MEAS - PAD RIGHT LEG PT: 142 MMHG
BH CV XLRA MEAS LEFT DIST CCA EDV: 14.3 CM/SEC
BH CV XLRA MEAS LEFT DIST CCA PSV: 93.2 CM/SEC
BH CV XLRA MEAS LEFT ICA/CCA RATIO: 1.2
BH CV XLRA MEAS LEFT PROX ICA EDV: -21.7 CM/SEC
BH CV XLRA MEAS LEFT PROX ICA PSV: -114 CM/SEC
BH CV XLRA MEAS RIGHT DIST CCA EDV: 16.2 CM/SEC
BH CV XLRA MEAS RIGHT DIST CCA PSV: 100 CM/SEC
BH CV XLRA MEAS RIGHT ICA/CCA RATIO: 0.8
BH CV XLRA MEAS RIGHT PROX ICA EDV: -16.8 CM/SEC
BH CV XLRA MEAS RIGHT PROX ICA PSV: -83.2 CM/SEC

## 2025-06-13 PROCEDURE — 93799 UNLISTED CV SVC/PROCEDURE: CPT

## 2025-06-13 PROCEDURE — VASCULARSCN2 VASCULAR SCREENING (BUNDLE) CAR: Performed by: INTERNAL MEDICINE

## 2025-06-27 ENCOUNTER — HOSPITAL ENCOUNTER (OUTPATIENT)
Dept: CARDIOLOGY | Facility: HOSPITAL | Age: 85
Discharge: HOME | End: 2025-06-27
Payer: MEDICARE

## 2025-06-27 ENCOUNTER — OFFICE VISIT (OUTPATIENT)
Dept: INTERNAL MEDICINE | Facility: CLINIC | Age: 85
End: 2025-06-27
Payer: MEDICARE

## 2025-06-27 VITALS
HEIGHT: 61 IN | DIASTOLIC BLOOD PRESSURE: 62 MMHG | HEART RATE: 68 BPM | WEIGHT: 115.3 LBS | SYSTOLIC BLOOD PRESSURE: 120 MMHG | BODY MASS INDEX: 21.77 KG/M2 | OXYGEN SATURATION: 97 % | TEMPERATURE: 97.6 F

## 2025-06-27 DIAGNOSIS — I87.2 VENOUS INSUFFICIENCY: ICD-10-CM

## 2025-06-27 DIAGNOSIS — M79.89 LEFT LEG SWELLING: ICD-10-CM

## 2025-06-27 DIAGNOSIS — I87.2 VENOUS INSUFFICIENCY: Primary | ICD-10-CM

## 2025-06-27 DIAGNOSIS — I87.2 VENOUS INSUFFICIENCY OF BOTH LOWER EXTREMITIES: ICD-10-CM

## 2025-06-27 PROBLEM — C44.721 SQUAMOUS CELL CARCINOMA OF SKIN OF LOWER EXTREMITY: Status: ACTIVE | Noted: 2025-01-08

## 2025-06-27 PROBLEM — C44.621 SQUAMOUS CELL CARCINOMA OF UPPER EXTREMITY: Status: ACTIVE | Noted: 2023-03-21

## 2025-06-27 LAB
BH CV LOWER VASCULAR LEFT COMMON FEMORAL AUGMENT: NORMAL
BH CV LOWER VASCULAR LEFT COMMON FEMORAL COMPETENT: NORMAL
BH CV LOWER VASCULAR LEFT COMMON FEMORAL COMPRESS: NORMAL
BH CV LOWER VASCULAR LEFT COMMON FEMORAL PHASIC: NORMAL
BH CV LOWER VASCULAR LEFT COMMON FEMORAL SPONT: NORMAL
BH CV LOWER VASCULAR LEFT DISTAL FEMORAL COMPRESS: NORMAL
BH CV LOWER VASCULAR LEFT GASTRONEMIUS COMPRESS: NORMAL
BH CV LOWER VASCULAR LEFT GREATER SAPH AK COMPRESS: NORMAL
BH CV LOWER VASCULAR LEFT GREATER SAPH BK COMPRESS: NORMAL
BH CV LOWER VASCULAR LEFT LESSER SAPH COMPRESS: NORMAL
BH CV LOWER VASCULAR LEFT MID FEMORAL AUGMENT: NORMAL
BH CV LOWER VASCULAR LEFT MID FEMORAL COMPETENT: NORMAL
BH CV LOWER VASCULAR LEFT MID FEMORAL COMPRESS: NORMAL
BH CV LOWER VASCULAR LEFT MID FEMORAL PHASIC: NORMAL
BH CV LOWER VASCULAR LEFT MID FEMORAL SPONT: NORMAL
BH CV LOWER VASCULAR LEFT PERONEAL COMPRESS: NORMAL
BH CV LOWER VASCULAR LEFT POPLITEAL AUGMENT: NORMAL
BH CV LOWER VASCULAR LEFT POPLITEAL COMPETENT: NORMAL
BH CV LOWER VASCULAR LEFT POPLITEAL COMPRESS: NORMAL
BH CV LOWER VASCULAR LEFT POPLITEAL PHASIC: NORMAL
BH CV LOWER VASCULAR LEFT POPLITEAL SPONT: NORMAL
BH CV LOWER VASCULAR LEFT POSTERIOR TIBIAL COMPRESS: NORMAL
BH CV LOWER VASCULAR LEFT PROFUNDA FEMORAL COMPRESS: NORMAL
BH CV LOWER VASCULAR LEFT PROXIMAL FEMORAL COMPRESS: NORMAL
BH CV LOWER VASCULAR LEFT SAPHENOFEMORAL JUNCTION COMPRESS: NORMAL
BH CV LOWER VASCULAR RIGHT COMMON FEMORAL AUGMENT: NORMAL
BH CV LOWER VASCULAR RIGHT COMMON FEMORAL COMPETENT: NORMAL
BH CV LOWER VASCULAR RIGHT COMMON FEMORAL COMPRESS: NORMAL
BH CV LOWER VASCULAR RIGHT COMMON FEMORAL PHASIC: NORMAL
BH CV LOWER VASCULAR RIGHT COMMON FEMORAL SPONT: NORMAL
BH CV VAS PRELIMINARY FINDINGS SCRIPTING: 1

## 2025-06-27 PROCEDURE — 3078F DIAST BP <80 MM HG: CPT | Performed by: INTERNAL MEDICINE

## 2025-06-27 PROCEDURE — 99214 OFFICE O/P EST MOD 30 MIN: CPT | Performed by: INTERNAL MEDICINE

## 2025-06-27 PROCEDURE — 1126F AMNT PAIN NOTED NONE PRSNT: CPT | Performed by: INTERNAL MEDICINE

## 2025-06-27 PROCEDURE — 1160F RVW MEDS BY RX/DR IN RCRD: CPT | Performed by: INTERNAL MEDICINE

## 2025-06-27 PROCEDURE — 93971 EXTREMITY STUDY: CPT

## 2025-06-27 PROCEDURE — 1159F MED LIST DOCD IN RCRD: CPT | Performed by: INTERNAL MEDICINE

## 2025-06-27 PROCEDURE — 3074F SYST BP LT 130 MM HG: CPT | Performed by: INTERNAL MEDICINE

## 2025-06-27 NOTE — PROGRESS NOTES
"Leg Swelling and Foot Swelling      HPI  Johana Corrigan is a 84 y.o. female RTC In acute care:   'That\" and shows legs. Notes that L leg is swollen asymmetric from L leg.   Has been going on 'for quite a while'. Notes that means ~4 months.  Has had skin darkening on legs for much longer and long hx of easy bruising and skin wounds on legs.   Notes in AM 'the L leg will match' the R leg but 'in 20 minutes will have swelling' on L leg.   No recent trauma to legs other than small scab on Lateral L leg.    Tattoo on L ankle was ~4 years ago.   No recent long car trips or plane flights.   Recently has vascular US that showed 'was normal'.     Review of Systems   Constitutional: Negative for chills and fever.   Cardiovascular:  Positive for leg swelling (L >> R). Negative for chest pain and dyspnea on exertion.   Respiratory:  Negative for shortness of breath.    Skin:  Positive for color change (B lower legs, skin darkening for 'a long time'.). Negative for poor wound healing, rash and suspicious lesions.   Musculoskeletal:  Negative for falls.   Psychiatric/Behavioral:  Negative for altered mental status.        The following portions of the patient's history were reviewed and updated as appropriate: allergies, current medications, past medical history, past social history, and problem list.      Current Outpatient Medications:     azelastine (ASTELIN) 0.1 % nasal spray, Administer 2 sprays into the nostril(s) as directed by provider 2 (Two) Times a Day. Use in each nostril as directed, Disp: 3 each, Rfl: 4    Calcium-Phosphorus-Vitamin D (CALCIUM/D3 ADULT GUMMIES PO), Take 2 tablets by mouth Every Morning., Disp: , Rfl:     fluticasone (FLONASE) 50 MCG/ACT nasal spray, Administer 2 sprays into the nostril(s) as directed by provider Daily., Disp: 16 g, Rfl: 5    ipratropium (ATROVENT) 0.06 % nasal spray, Administer 2 sprays into the nostril(s) as directed by provider 3 (Three) Times a Day., Disp: 15 mL, Rfl: 12    " "losartan (COZAAR) 25 MG tablet, TAKE 1 TABLET EVERY DAY, Disp: 90 tablet, Rfl: 3    montelukast (SINGULAIR) 10 MG tablet, TAKE 1 TABLET EVERY NIGHT, Disp: 90 tablet, Rfl: 3    mupirocin (BACTROBAN) 2 % ointment, Apply 1 Application topically to the appropriate area as directed 3 (Three) Times a Day., Disp: 30 g, Rfl: 3    rosuvastatin (CRESTOR) 5 MG tablet, Take 1 tablet by mouth Daily., Disp: 90 tablet, Rfl: 3    Synthroid 50 MCG tablet, TAKE 1 TABLET EVERY DAY, Disp: 90 tablet, Rfl: 3    Current Facility-Administered Medications:     nitroglycerin (NITROSTAT) SL tablet 0.4 mg, 0.4 mg, Sublingual, Q5 Min PRN, Julian Camargo MD    sodium chloride 0.9 % flush 10 mL, 10 mL, Intravenous, PRN, Julian Camargo MD    Vitals:    25 0810   BP: 120/62   BP Location: Left arm   Patient Position: Sitting   Cuff Size: Adult   Pulse: 68   Temp: 97.6 °F (36.4 °C)   TempSrc: Infrared   SpO2: 97%   Weight: 52.3 kg (115 lb 4.8 oz)   Height: 154.9 cm (60.98\")     Body mass index is 21.8 kg/m².      Physical Exam  Constitutional:       General: She is not in acute distress.     Appearance: Normal appearance. She is normal weight. She is not ill-appearing or toxic-appearing.   HENT:      Head: Normocephalic and atraumatic.   Eyes:      General: No scleral icterus.  Cardiovascular:      Rate and Rhythm: Normal rate and regular rhythm.      Pulses:           Dorsalis pedis pulses are 2+ on the right side and 2+ on the left side.      Heart sounds: No murmur heard.     No friction rub. No gallop.      Comments: Diffuse scattered spider varicose veins in BLE  Pulmonary:      Effort: Pulmonary effort is normal. No respiratory distress.      Breath sounds: No wheezing, rhonchi or rales.   Musculoskeletal:      Cervical back: Normal range of motion and neck supple.      Right lower leg: No edema (sockline only).      Left lower le+ Pitting Edema (to mid calf) present.   Skin:         Neurological:      General: No focal " "deficit present.      Mental Status: She is alert.      Cranial Nerves: No cranial nerve deficit.      Gait: Gait normal.   Psychiatric:         Mood and Affect: Mood normal.         Behavior: Behavior normal.         Thought Content: Thought content normal.         Assessment/ Plan  Diagnoses and all orders for this visit:    Venous insufficiency  -     Duplex Venous Lower Extremity - Left CAR; Future  -     Ambulatory Referral to Vascular Surgery    Left leg swelling  -     Duplex Venous Lower Extremity - Left CAR; Future  -     Ambulatory Referral to Vascular Surgery    Venous insufficiency of both lower extremities  -     Ambulatory Referral to Vascular Surgery        Return for Next scheduled follow up.      Discussion:  Johana Corrigan is a 84 y.o. female with HTN, HLD, hypothyroidism s/p negative stress echo for 2025 RTC in acute care (new patient issue to examiner) with years of BLE skin darkening/hemosiderin deposition and more recent ~4 months of L >>R lower extremity edema to mid calf.  Patient notes swelling in left leg nearly resolves entirely overnight but reoccurs \"within 20 minutes\" getting up in the a.m. and placing feet to gravity.  Exam notable for diffuse hemosiderin deposition BLE, 1+ edema in LLE to mid calf (only sock line on R), and diffuse spider and varicose veins in BLE.  Chart review reveals patient has had duplex venous in 2023 and 2024 left leg to R/O DVT.  Vascular screen 6/2025 showed good arterial inflow.  D/W patient obvious diagnosis of venous insufficiency with hemosiderin deposition BLE.  Somewhat unclear to me as to why patient is unaware of this diagnosis given long-term issues and her very aware/functional status.  Spent a good bit of time discussing pathophysiology of venous insufficiency and Tx options.  Advised the following:  - Complete stat venous duplex LLE today to r/o DVT, though very low suspicion.  Feel it necessary to rule this out before referral onto the vein " clinic.  - Refer to vein clinic for vein mapping evaluation and investigation for culprit vein in left leg, given pain and discomfort SX.  - Advised patient for compression sock daily to left leg to control swelling and discomfort, counseled on rationale and need to demonstrate compression sock use for vein clinic Tx.  - RTC as planned.    Addendum: Called by radiology on day of visit, duplex negative for DVT.  Patient made aware and will proceed with pain clinic referral.

## (undated) DEVICE — SINGLE USE BIOPSY VALVE MAJ-210: Brand: SINGLE USE BIOPSY VALVE (STERILE)

## (undated) DEVICE — LN SMPL O2 NASL/ORL SMART/CAPNOLINE PLS A/

## (undated) DEVICE — SKIN PREP TRAY W/CHG: Brand: MEDLINE INDUSTRIES, INC.

## (undated) DEVICE — VITAL SIGNS™ JACKSON-REES CIRCUITS: Brand: VITAL SIGNS™

## (undated) DEVICE — SUT VIC 3/0 SH 27IN J416H

## (undated) DEVICE — MSK AIRWY LARYNG LMA UNIQUE STD PK SZ4

## (undated) DEVICE — DRSNG WND GZ PAD BORDERED 4X8IN STRL

## (undated) DEVICE — ELECTRD BLD EZ CLN MOD XLNG 2.75IN

## (undated) DEVICE — SINGLE USE SUCTION VALVE MAJ-209: Brand: SINGLE USE SUCTION VALVE (STERILE)

## (undated) DEVICE — PENCL ES MEGADINE EZ/CLEAN BUTN W/HOLSTR 10FT

## (undated) DEVICE — SUT VIC 5/0 PS2 18IN J495H

## (undated) DEVICE — TUBING, SUCTION, 1/4" X 10', STRAIGHT: Brand: MEDLINE

## (undated) DEVICE — PATIENT RETURN ELECTRODE, SINGLE-USE, CONTACT QUALITY MONITORING, ADULT, WITH 9FT CORD, FOR PATIENTS WEIGING OVER 33LBS. (15KG): Brand: MEGADYNE

## (undated) DEVICE — CONMED DISPOSABLE BRONCHIAL CYTOLOGY BRUSH, STRAIGHT HANDLE, 3 MM X 120 CM: Brand: CONMED

## (undated) DEVICE — ADAPT SWVL FIBROPTIC BRONCH

## (undated) DEVICE — GLV SURG PREMIERPRO ORTHO LTX PF SZ7.5 BRN

## (undated) DEVICE — PK PROC MINOR TOWER 40